# Patient Record
Sex: FEMALE | Race: BLACK OR AFRICAN AMERICAN | Employment: OTHER | URBAN - METROPOLITAN AREA
[De-identification: names, ages, dates, MRNs, and addresses within clinical notes are randomized per-mention and may not be internally consistent; named-entity substitution may affect disease eponyms.]

---

## 2017-03-15 ENCOUNTER — CARE COORDINATION (OUTPATIENT)
Dept: GERIATRIC MEDICINE | Facility: CLINIC | Age: 77
End: 2017-03-15

## 2017-03-15 NOTE — PROGRESS NOTES
Client is new enrollee to [facility] effective 3/1/17 with [facility]. No transfer documents available as previous CM had not been able to contact client.     CM called to introduce self as new CM and to offer a home visit.  was left requesting a return call.     Jodi Ingram RN  [facility]  [phone]  Fax: [phone]

## 2017-03-17 ENCOUNTER — CARE COORDINATION (OUTPATIENT)
Dept: GERIATRIC MEDICINE | Facility: CLINIC | Age: 77
End: 2017-03-17

## 2017-03-17 NOTE — PROGRESS NOTES
Second call to client to introduce self and offer a home visit.  was left requesting a return call.     Jodi Ingram RN  [facility]  [phone]  Fax: [phone]

## 2017-04-05 NOTE — PROGRESS NOTES
"Per CM, mailed client a \"Refusal of Home Visit\" letter.  MMIS entry.    Mailed member [employer] Self Report Health Assessment with self addressed return envelope.       Vidya Brooks  Case Management Specialist  [facility]   [phone]    "

## 2017-06-20 ENCOUNTER — CARE COORDINATION (OUTPATIENT)
Dept: GERIATRIC MEDICINE | Facility: CLINIC | Age: 77
End: 2017-06-20

## 2017-06-20 NOTE — PROGRESS NOTES
Call from client's daughter Glory reporting that her mom is back from [place]. Daughter is wondering if CM can set up rides for client to go to [employer]. CM explained that the health plan pays for rides to all medical appointments, but if she is interested in other transportation this would be a waiver service. CM offered to come out for an assessment and determine if client is eligible for EW. Explained that if she is eligible for waiver there would be options available for transportation. Daughter stated that they would be interested in an assessment, but will need to call back with a date that would work- likely mid July or later. Will wait for return call to schedule visit.     Jodi Ingram RN  [facility]  [phone]  Fax: [phone]

## 2017-06-21 ENCOUNTER — OFFICE VISIT (OUTPATIENT)
Dept: FAMILY MEDICINE | Facility: CLINIC | Age: 77
End: 2017-06-21
Payer: COMMERCIAL

## 2017-06-21 VITALS
TEMPERATURE: 97.3 F | DIASTOLIC BLOOD PRESSURE: 60 MMHG | SYSTOLIC BLOOD PRESSURE: 146 MMHG | HEART RATE: 58 BPM | WEIGHT: 117 LBS | BODY MASS INDEX: 22.85 KG/M2 | OXYGEN SATURATION: 98 %

## 2017-06-21 DIAGNOSIS — K21.9 GASTROESOPHAGEAL REFLUX DISEASE WITHOUT ESOPHAGITIS: ICD-10-CM

## 2017-06-21 DIAGNOSIS — M81.0 OSTEOPOROSIS: ICD-10-CM

## 2017-06-21 DIAGNOSIS — M54.2 CERVICALGIA: ICD-10-CM

## 2017-06-21 DIAGNOSIS — I10 ESSENTIAL HYPERTENSION: ICD-10-CM

## 2017-06-21 DIAGNOSIS — J45.20 INTERMITTENT ASTHMA, UNCOMPLICATED: Primary | ICD-10-CM

## 2017-06-21 DIAGNOSIS — M51.379 DEGENERATION OF LUMBAR OR LUMBOSACRAL INTERVERTEBRAL DISC: ICD-10-CM

## 2017-06-21 DIAGNOSIS — I27.20 PULMONARY HYPERTENSION (H): ICD-10-CM

## 2017-06-21 DIAGNOSIS — I34.0 MITRAL VALVE INSUFFICIENCY, UNSPECIFIED ETIOLOGY: ICD-10-CM

## 2017-06-21 DIAGNOSIS — J44.9 COPD, MODERATE (H): ICD-10-CM

## 2017-06-21 LAB
BASOPHILS # BLD AUTO: 0 10E9/L (ref 0–0.2)
BASOPHILS NFR BLD AUTO: 0.9 %
DIFFERENTIAL METHOD BLD: ABNORMAL
EOSINOPHIL # BLD AUTO: 0.3 10E9/L (ref 0–0.7)
EOSINOPHIL NFR BLD AUTO: 8.3 %
ERYTHROCYTE [DISTWIDTH] IN BLOOD BY AUTOMATED COUNT: 13.5 % (ref 10–15)
ERYTHROCYTE [SEDIMENTATION RATE] IN BLOOD BY WESTERGREN METHOD: 27 MM/H (ref 0–30)
HCT VFR BLD AUTO: 38.2 % (ref 35–47)
HGB BLD-MCNC: 12.2 G/DL (ref 11.7–15.7)
LYMPHOCYTES # BLD AUTO: 1.8 10E9/L (ref 0.8–5.3)
LYMPHOCYTES NFR BLD AUTO: 56 %
MCH RBC QN AUTO: 31.4 PG (ref 26.5–33)
MCHC RBC AUTO-ENTMCNC: 31.9 G/DL (ref 31.5–36.5)
MCV RBC AUTO: 98 FL (ref 78–100)
MONOCYTES # BLD AUTO: 0.4 10E9/L (ref 0–1.3)
MONOCYTES NFR BLD AUTO: 11.1 %
NEUTROPHILS # BLD AUTO: 0.8 10E9/L (ref 1.6–8.3)
NEUTROPHILS NFR BLD AUTO: 23.7 %
PLATELET # BLD AUTO: 156 10E9/L (ref 150–450)
RBC # BLD AUTO: 3.89 10E12/L (ref 3.8–5.2)
WBC # BLD AUTO: 3.2 10E9/L (ref 4–11)

## 2017-06-21 PROCEDURE — 85652 RBC SED RATE AUTOMATED: CPT | Performed by: INTERNAL MEDICINE

## 2017-06-21 PROCEDURE — 80053 COMPREHEN METABOLIC PANEL: CPT | Performed by: INTERNAL MEDICINE

## 2017-06-21 PROCEDURE — 85027 COMPLETE CBC AUTOMATED: CPT | Performed by: INTERNAL MEDICINE

## 2017-06-21 PROCEDURE — 99214 OFFICE O/P EST MOD 30 MIN: CPT | Performed by: INTERNAL MEDICINE

## 2017-06-21 PROCEDURE — 36415 COLL VENOUS BLD VENIPUNCTURE: CPT | Performed by: INTERNAL MEDICINE

## 2017-06-21 PROCEDURE — 80061 LIPID PANEL: CPT | Performed by: INTERNAL MEDICINE

## 2017-06-21 PROCEDURE — 85004 AUTOMATED DIFF WBC COUNT: CPT | Performed by: INTERNAL MEDICINE

## 2017-06-21 RX ORDER — TRAMADOL HYDROCHLORIDE 50 MG/1
50 TABLET ORAL EVERY 12 HOURS PRN
Qty: 60 TABLET | Refills: 3 | Status: SHIPPED | OUTPATIENT
Start: 2017-06-21 | End: 2018-02-16

## 2017-06-21 RX ORDER — METOPROLOL TARTRATE 25 MG/1
TABLET, FILM COATED ORAL
Qty: 180 TABLET | Refills: 3 | Status: SHIPPED | OUTPATIENT
Start: 2017-06-21 | End: 2018-05-22

## 2017-06-21 RX ORDER — LISINOPRIL 5 MG/1
5 TABLET ORAL DAILY
Qty: 90 TABLET | Refills: 3 | Status: SHIPPED | OUTPATIENT
Start: 2017-06-21 | End: 2018-05-22

## 2017-06-21 NOTE — MR AVS SNAPSHOT
After Visit Summary   6/21/2017    Wilma Lorenzana    MRN: [id number]           Patient Information     Date Of Birth          1940        Visit Information        Provider Department      6/21/2017 9:45 AM Lavelle Aj MD; [place] [employer] [facility] [place]        Today's Diagnoses     Intermittent asthma, uncomplicated    -  1    Gastroesophageal reflux disease without esophagitis        LUMB/LUMBOSAC DISC DEGEN        Cervicalgia        Essential hypertension        COPD, moderate (H)        Osteoporosis        Mitral valve insufficiency, unspecified etiology        Pulmonary hypertension (H)          Care Instructions    (J45.20) Intermittent asthma, uncomplicated  (primary encounter diagnosis)  Comment: Asthma is stable and we will continue your current medications of Advair and Albuterol as needed   Plan:     (K21.9) Gastroesophageal reflux disease without esophagitis  Comment: Also need refills of omeprazole.  We will continue  Plan: omeprazole (PRILOSEC) 20 MG CR capsule            (M51.37) LUMB/LUMBOSAC DISC DEGEN  Comment: Check ESR today and continue tramadol for pain control  Plan: ESR: Erythrocyte sedimentation rate, traMADol         (ULTRAM) 50 MG tablet            (M54.2) Cervicalgia  Comment: We will refill prescription for tramadol as above   Plan: traMADol (ULTRAM) 50 MG tablet            (I10) Essential hypertension  Comment: blood pressure is elevated today, but believed to be improved and stable at home.  We will continue metoprolol and lisinopril and ask you to follow up in the clinic in the next two to three weeks.  Plan: CBC with platelets, Lipid panel reflex to         direct LDL, Comprehensive metabolic panel,         metoprolol (LOPRESSOR) 25 MG tablet, lisinopril        (PRINIVIL/ZESTRIL) 5 MG tablet            (J44.9) COPD, moderate (H)  Comment: Stable  Plan:     (M81.0) Osteoporosis  Comment: Continue Boniva and vitamin D   Plan:  "    (I34.0) Mitral valve insufficiency, unspecified etiology  Comment: Recommend that we recheck an echocardiogram - you seem to be doing quite well with regards to your cardiac function . [facility] - [phone]   Plan: Echocardiogram Complete            (I27.2) Pulmonary hypertension (H)  Comment: as above   Plan: Echocardiogram Complete                     Follow-ups after your visit        Future tests that were ordered for you today     Open Future Orders        Priority Expected Expires Ordered    Echocardiogram Complete Routine  6/21/2018 6/21/2017            Who to contact     If you have questions or need follow up information about today's clinic visit or your schedule please contact [facility] directly at [phone].  Normal or non-critical lab and imaging results will be communicated to you by Idooblehart, letter or phone within 4 business days after the clinic has received the results. If you do not hear from us within 7 days, please contact the clinic through Idooblehart or phone. If you have a critical or abnormal lab result, we will notify you by phone as soon as possible.  Submit refill requests through VeedMe or call your pharmacy and they will forward the refill request to us. Please allow 3 business days for your refill to be completed.          Additional Information About Your Visit        Idooblehart Information     VeedMe lets you send messages to your doctor, view your test results, renew your prescriptions, schedule appointments and more. To sign up, go to [email or web address] . Click on \"Log in\" on the left side of the screen, which will take you to the Welcome page. Then click on \"Sign up Now\" on the right side of the page.     You will be asked to enter the access code listed below, as well as some personal information. Please follow the directions to create your username and password.     Your access code is: [email or web address]  Expires: 9/19/2017 10:30 AM     Your access code " will  in 90 days. If you need help or a new code, please call your [place] clinic or [phone].        Care EveryWhere ID     This is your Care EveryWhere ID. This could be used by other organizations to access your [place] medical records  [id number]        Your Vitals Were     Pulse Temperature Pulse Oximetry Breastfeeding? BMI (Body Mass Index)       58 97.3  F (36.3  C) (Oral) 98% No 22.85 kg/m2        Blood Pressure from Last 3 Encounters:   17 146/60   16 149/63   11/24/15 110/65    Weight from Last 3 Encounters:   17 117 lb (53.1 kg)   16 116 lb 14.4 oz (53 kg)   11/24/15 115 lb (52.2 kg)              We Performed the Following     CBC with platelets     Comprehensive metabolic panel     ESR: Erythrocyte sedimentation rate     Lipid panel reflex to direct LDL          Today's Medication Changes          These changes are accurate as of: 17 10:30 AM.  If you have any questions, ask your nurse or doctor.               These medicines have changed or have updated prescriptions.        Dose/Directions    omeprazole 20 MG CR capsule   Commonly known as:  priLOSEC   This may have changed:  See the new instructions.   Used for:  Gastroesophageal reflux disease without esophagitis   Changed by:  Lavelle Aj MD        TAKE 1 CAPSULE (20 MG) BY MOUTH DAILY   Quantity:  90 capsule   Refills:  3            Where to get your medicines      These medications were sent to [facility]/pharmacy #[id number] - [place], [place] - [address]  [address] [address]     Phone:  [phone]     lisinopril 5 MG tablet    metoprolol 25 MG tablet    omeprazole 20 MG CR capsule         Some of these will need a paper prescription and others can be bought over the counter.  Ask your nurse if you have questions.     Bring a paper prescription for each of these medications     traMADol 50 MG tablet                Primary Care Provider Office Phone # Fax #    Lavelle Aj MD  [phone]       [facility] [address]   [facility] [address]        Equal Access to Services     BROOK[place] ENNIS : Hadii aad ku hadlandon Sorebekah, wafroyda luqadaha, qaybta kaalmada shar, yamil hackett laEdwigejose martinez. So [facility] [phone].    ATENCIÓN: Si habla español, tiene a strauss disposición servicios gratuitos de asistencia lingüística. Llame al [phone].    We comply with applicable federal civil rights laws and [place] laws. We do not discriminate on the basis of race, color, national origin, age, disability sex, sexual orientation or gender identity.            Thank you!     Thank you for choosing [facility]  for your care. Our goal is always to provide you with excellent care. Hearing back from our patients is one way we can continue to improve our services. Please take a few minutes to complete the written survey that you may receive in the mail after your visit with us. Thank you!             Your Updated Medication List - Protect others around you: Learn how to safely use, store and throw away your medicines at www.disposemymeds.org.          This list is accurate as of: 6/21/17 10:30 AM.  Always use your most recent med list.                   Brand Name Dispense Instructions for use Diagnosis    albuterol 108 (90 BASE) MCG/ACT Inhaler    albuterol    3 Inhaler    Inhale 2 puffs into the lungs every 4 hours as needed for shortness of breath / dyspnea    Mild persistent asthma without complication       fluticasone-salmeterol 250-50 MCG/DOSE diskus inhaler    ADVAIR DISKUS    3 Inhaler    Inhale 1 puff into the lungs 2 times daily    Mild persistent asthma without complication       guaiFENesin-dextromethorphan 100-10 MG/5ML syrup    ROBITUSSIN DM    560 mL    Take 5 mLs by mouth every 4 hours as needed for cough    Cough       IBANdronate 150 MG tablet    BONIVA    3 tablet    Take 1 tablet (150 mg) by mouth every 30 days Take with 8 oz  water and stay upright for > 30 min after dose.  Take at least 60 min before breakfast    Osteoporosis       lisinopril 5 MG tablet    PRINIVIL/ZESTRIL    90 tablet    Take 1 tablet (5 mg) by mouth daily    Essential hypertension       metoprolol 25 MG tablet    LOPRESSOR    180 tablet    TAKE 1 TABLET (25 MG) BY MOUTH 2 TIMES DAILY    Essential hypertension       multivitamin, therapeutic with minerals Tabs tablet      Take 1 tablet by mouth daily        omeprazole 20 MG CR capsule    priLOSEC    90 capsule    TAKE 1 CAPSULE (20 MG) BY MOUTH DAILY    Gastroesophageal reflux disease without esophagitis       order for DME     1 Device    1 blood pressure cuff, automatic digital reading for home use    Benign hypertension       predniSONE 20 MG tablet    DELTASONE    10 tablet    Take 2 tablets (40 mg) by mouth daily    Acute bronchitis, unspecified organism       traMADol 50 MG tablet    ULTRAM    60 tablet    Take 1 tablet (50 mg) by mouth every 12 hours as needed for pain    Degeneration of lumbar or lumbosacral intervertebral disc, Cervicalgia       VITAMIN D2 PO      Take 1.25 mg by mouth once a week

## 2017-06-21 NOTE — LETTER
[place] [address] [facility]  [address]  [place]  [address]  Tel: [phone]    July 13, 2017    Wilma Rosy Lorenzana  [address] [address]        Dear Ms. Lorenzana,    Labs within normal limits but for new finding of low white blood cell count and neutrophil count - can we request a follow up appointment and recheck in next 2-3 weeks.    If you have any further questions or problems, please contact our office.      Sincerely,    Lavelle Aj MD / dillan          Enclosure: Lab Results

## 2017-06-21 NOTE — PROGRESS NOTES
[facility]  CLINIC PROGRESS NOTE    Subjective:   Gastroesophageal reflux disease without esophagitis   Has run out and would like to refill omeprazole.    Intermittent asthma, uncomplicated   Awetash Rosy Lorenzana has continued to have stable asthma.  She has continued on advair twice per day and also using albuterol as needed.  She is using the albuterol depending on the day from 0 times up to 3-4 times.   Hypertension   Blood pressure is well controlled.  She is checking her blood pressure at home and although she does not know the numbers she believes her blood pressure is well controlled.  She has had blood pressure medications refilled by a physician in [place].  Low back pain   She is doing well with tramadol.    Neck Stiffness   Her main concern is pain in the shoulders and neck.  She has limitation in neck range of motion but today range of motion is OK.    Past medical history, medications, allergies, social history, family history reviewed and updated in [facility] as of 6/21/2017 .    ROS  CONSTITUTIONAL: no fatigue, no unexpected change in weight  SKIN: no worrisome rashes, no worrisome moles, no worrisome lesions  EYES: no acute vision problems or changes  ENT: no ear problems, no mouth problems, no throat problems  RESP: no significant cough, no shortness of breath  CV: no chest pain, no palpitations, no new or worsening peripheral edema  GI: no nausea, no vomiting, no constipation, no diarrhea  : no frequency, no dysuria, no hematuria  MS: as above   PSYCHIATRIC: no changes in mood or affect      Objective:  Vitals  /60  Pulse 58  Temp 97.3  F (36.3  C) (Oral)  Wt 117 lb (53.1 kg)  SpO2 98%  Breastfeeding? No  BMI 22.85 kg/m2  GEN: Alert Oriented x3 NAD  HEENT: Atraumatic, normocephalic, neck supple, no thyromegaly, negative cervical adenopathy, full range of motion of neck  TM: TM bilaterally pearly and grey with normal light reflex  CV: RRR no murmurs or rubs  PULM: CTA no wheezes  or crackles  ABD: Soft, nontender nondistended,   SKIN: No visible skin lesion or ulcerations  EXT: no edema bilateral lower extremities  NEURO: Gait and station normal , No focal neurologic deficits, normal strength in upper and lower extremity 5/5   PSYCH: Mood good, affect mood congruent    No results found for this or any previous visit (from the past 24 hour(s)).    Assessment/Plan:  Patient Instructions   (J45.20) Intermittent asthma, uncomplicated  (primary encounter diagnosis)  Comment: Asthma is stable and we will continue your current medications of Advair and Albuterol as needed   Plan:     (K21.9) Gastroesophageal reflux disease without esophagitis  Comment: Also need refills of omeprazole.  We will continue  Plan: omeprazole (PRILOSEC) 20 MG CR capsule            (M51.37) LUMB/LUMBOSAC DISC DEGEN  Comment: Check ESR today and continue tramadol for pain control  Plan: ESR: Erythrocyte sedimentation rate, traMADol         (ULTRAM) 50 MG tablet            (M54.2) Cervicalgia  Comment: We will refill prescription for tramadol as above   Plan: traMADol (ULTRAM) 50 MG tablet            (I10) Essential hypertension  Comment: blood pressure is elevated today, but believed to be improved and stable at home.  We will continue metoprolol and lisinopril and ask you to follow up in the clinic in the next two to three weeks.  Plan: CBC with platelets, Lipid panel reflex to         direct LDL, Comprehensive metabolic panel,         metoprolol (LOPRESSOR) 25 MG tablet, lisinopril        (PRINIVIL/ZESTRIL) 5 MG tablet            (J44.9) COPD, moderate (H)  Comment: Stable  Plan:     (M81.0) Osteoporosis  Comment: Continue Boniva and vitamin D   Plan:     (I34.0) Mitral valve insufficiency, unspecified etiology  Comment: Recommend that we recheck an echocardiogram - you seem to be doing quite well with regards to your cardiac function . [facility] - [phone]   Plan: Echocardiogram Complete            (I27.2)  Pulmonary hypertension (H)  Comment: as above   Plan: Echocardiogram Complete               Follow up in 3 months    Disclaimer: This note consists of symbols derived from keyboarding, dictation and/or voice recognition software. As a result, there may be errors in the script that have gone undetected. Please consider this when interpreting information found in this chart.    Lavelle Aj MD  [phone]

## 2017-06-21 NOTE — NURSING NOTE
Chief Complaint   Patient presents with     Recheck Medication       Initial /60  Pulse 58  Temp 97.3  F (36.3  C) (Oral)  Wt 117 lb (53.1 kg)  SpO2 98%  Breastfeeding? No  BMI 22.85 kg/m2 Estimated body mass index is 22.85 kg/(m^2) as calculated from the following:    Height as of 5/16/16: 5' (1.524 m).    Weight as of this encounter: 117 lb (53.1 kg).  Medication Reconciliation: complete   Sakina DURAN CMA

## 2017-06-21 NOTE — PATIENT INSTRUCTIONS
(J45.20) Intermittent asthma, uncomplicated  (primary encounter diagnosis)  Comment: Asthma is stable and we will continue your current medications of Advair and Albuterol as needed   Plan:     (K21.9) Gastroesophageal reflux disease without esophagitis  Comment: Also need refills of omeprazole.  We will continue  Plan: omeprazole (PRILOSEC) 20 MG CR capsule            (M51.37) LUMB/LUMBOSAC DISC DEGEN  Comment: Check ESR today and continue tramadol for pain control  Plan: ESR: Erythrocyte sedimentation rate, traMADol         (ULTRAM) 50 MG tablet            (M54.2) Cervicalgia  Comment: We will refill prescription for tramadol as above   Plan: traMADol (ULTRAM) 50 MG tablet            (I10) Essential hypertension  Comment: blood pressure is elevated today, but believed to be improved and stable at home.  We will continue metoprolol and lisinopril and ask you to follow up in the clinic in the next two to three weeks.  Plan: CBC with platelets, Lipid panel reflex to         direct LDL, Comprehensive metabolic panel,         metoprolol (LOPRESSOR) 25 MG tablet, lisinopril        (PRINIVIL/ZESTRIL) 5 MG tablet            (J44.9) COPD, moderate (H)  Comment: Stable  Plan:     (M81.0) Osteoporosis  Comment: Continue Boniva and vitamin D   Plan:     (I34.0) Mitral valve insufficiency, unspecified etiology  Comment: Recommend that we recheck an echocardiogram - you seem to be doing quite well with regards to your cardiac function . [facility] - [phone]   Plan: Echocardiogram Complete            (I27.2) Pulmonary hypertension (H)  Comment: as above   Plan: Echocardiogram Complete

## 2017-06-22 LAB
ALBUMIN SERPL-MCNC: 3.8 G/DL (ref 3.4–5)
ALP SERPL-CCNC: 75 U/L (ref 40–150)
ALT SERPL W P-5'-P-CCNC: 17 U/L (ref 0–50)
ANION GAP SERPL CALCULATED.3IONS-SCNC: 8 MMOL/L (ref 3–14)
AST SERPL W P-5'-P-CCNC: 18 U/L (ref 0–45)
BILIRUB SERPL-MCNC: 0.4 MG/DL (ref 0.2–1.3)
BUN SERPL-MCNC: 15 MG/DL (ref 7–30)
CALCIUM SERPL-MCNC: 9.4 MG/DL (ref 8.5–10.1)
CHLORIDE SERPL-SCNC: 105 MMOL/L (ref 94–109)
CHOLEST SERPL-MCNC: 175 MG/DL
CO2 SERPL-SCNC: 28 MMOL/L (ref 20–32)
CREAT SERPL-MCNC: 0.65 MG/DL (ref 0.52–1.04)
GFR SERPL CREATININE-BSD FRML MDRD: 89 ML/MIN/1.7M2
GLUCOSE SERPL-MCNC: 75 MG/DL (ref 70–99)
HDLC SERPL-MCNC: 59 MG/DL
LDLC SERPL CALC-MCNC: 105 MG/DL
NONHDLC SERPL-MCNC: 116 MG/DL
POTASSIUM SERPL-SCNC: 4.3 MMOL/L (ref 3.4–5.3)
PROT SERPL-MCNC: 7.8 G/DL (ref 6.8–8.8)
SODIUM SERPL-SCNC: 141 MMOL/L (ref 133–144)
TRIGL SERPL-MCNC: 57 MG/DL

## 2017-07-17 ENCOUNTER — TELEPHONE (OUTPATIENT)
Dept: FAMILY MEDICINE | Facility: CLINIC | Age: 77
End: 2017-07-17

## 2017-07-17 DIAGNOSIS — Z01.00 EXAMINATION OF EYES AND VISION: Primary | ICD-10-CM

## 2017-07-17 NOTE — TELEPHONE ENCOUNTER
FHN: [facility] [place] [phone] [email or web address]    Lavelle Aj MD

## 2017-07-17 NOTE — TELEPHONE ENCOUNTER
Reason for Call:  Other Referral for Eye doctor    Detailed comments: The paatient needs a referral for an Eye doctor     Phone Number Patient can be reached at: Call Daughter Janelle when   Referral is complete    Best Time: anytime    Can we leave a detailed message on this number? YES    Call taken on 7/17/2017 at 3:26 PM by Estella Brown

## 2017-07-20 ENCOUNTER — CARE COORDINATION (OUTPATIENT)
Dept: GERIATRIC MEDICINE | Facility: CLINIC | Age: 77
End: 2017-07-20

## 2017-07-20 NOTE — PROGRESS NOTES
Call from daughter asking for the number she can call to schedule her mom a ride. Number for [employer] was given. CM confirmed that she had the [employer] ID # as she would need this, and also informed her that she will need the address for the clinic that she is going. Daughter asked if writer is the person who could come out an do an assessment. CM confirmed this was correct and daughter shared she will call back when they are ready for a visit.     Jodi Ingram RN  [facility]  [phone]  Fax: [phone]

## 2017-08-02 ENCOUNTER — TELEPHONE (OUTPATIENT)
Dept: FAMILY MEDICINE | Facility: CLINIC | Age: 77
End: 2017-08-02

## 2017-08-02 NOTE — TELEPHONE ENCOUNTER
PA has been sent to [facility] ([employer]) for further review of the ibandronate for senile osteoporosis.    Yonathan Burks, CMA

## 2017-08-11 NOTE — TELEPHONE ENCOUNTER
PA has been approved for the ibandronate 150 mg from 5/4/17 - 8/2/18. Pharmacy already knows.    Yonathan Burks, CMA

## 2017-08-25 ENCOUNTER — CARE COORDINATION (OUTPATIENT)
Dept: GERIATRIC MEDICINE | Facility: CLINIC | Age: 77
End: 2017-08-25

## 2017-08-25 NOTE — PROGRESS NOTES
Call from daughter to schedule assessment. Initial home visit has been scheduled for 9/8/17 at 11AM. Daughter declined an  stating that her mom is more comfortable with her interpreting. Client and daughter feel there tends to be misunderstandings when they use an . Shared that a waiver will need to be signed.     Jodi Ingram RN  [facility]  [phone]  Fax: [phone]

## 2017-08-30 ENCOUNTER — HOSPITAL ENCOUNTER (OUTPATIENT)
Dept: CARDIOLOGY | Facility: CLINIC | Age: 77
Discharge: HOME | End: 2017-08-30
Attending: INTERNAL MEDICINE | Admitting: INTERNAL MEDICINE
Payer: COMMERCIAL

## 2017-08-30 DIAGNOSIS — I27.20 PULMONARY HYPERTENSION (H): ICD-10-CM

## 2017-08-30 DIAGNOSIS — I34.0 MITRAL VALVE INSUFFICIENCY, UNSPECIFIED ETIOLOGY: ICD-10-CM

## 2017-08-30 PROCEDURE — 93306 TTE W/DOPPLER COMPLETE: CPT | Mod: 26 | Performed by: INTERNAL MEDICINE

## 2017-08-30 PROCEDURE — 93306 TTE W/DOPPLER COMPLETE: CPT

## 2017-08-31 ENCOUNTER — TELEPHONE (OUTPATIENT)
Dept: FAMILY MEDICINE | Facility: CLINIC | Age: 77
End: 2017-08-31

## 2017-09-01 NOTE — TELEPHONE ENCOUNTER
Called Home # with the help of  services -no answer   Called Daughters Mobile # and could not leave message -mailbox was full-Madalyn MCCRACKEN CMA

## 2017-09-01 NOTE — TELEPHONE ENCOUNTER
Spoke with Daughter ([employer]) and gave her results of her Mom's Echo and recommendations from Dr. Aj- she will call [facility] and and schedule with Dr.Ip-Leatrice KAYKAY CMA

## 2017-09-01 NOTE — TELEPHONE ENCOUNTER
Can we call Wilma Lorenzana and let her know that     Your echocardiogram shows worsening of your mitral valve stenosis and I would recommend a follow up in cardiology with Dr. Carrasco to review - [facility] - [phone]

## 2017-09-06 ENCOUNTER — CARE COORDINATION (OUTPATIENT)
Dept: GERIATRIC MEDICINE | Facility: CLINIC | Age: 77
End: 2017-09-06

## 2017-09-08 ENCOUNTER — CARE COORDINATION (OUTPATIENT)
Dept: GERIATRIC MEDICINE | Facility: CLINIC | Age: 77
End: 2017-09-08

## 2017-09-08 NOTE — PROGRESS NOTES
Home visit/[facility] Risk Assessment/EW screening completed on: 9/8/17 with client, her daughter, and this CM. Client declined to have a formal  and requested that her daughter interpret. Waiver was signed.   Member resides: Client lives in a townhouse with her daughter. Home is clean and free from any odor. Client's room is on the second floor- client observed to go up the full flight of stairs independently.   Member currently receiving the following services: No formal service.  See EMR for a list of client's diagnoses and medications.  Medication management: Client takes medications out of the bottle. At times daughter provides reminders for medications. Client has good understanding of medications.  Falls: Client denies any falls. Client does report some changes to her balance and requested a cane.   ADL's/IADL's:  Client is independent with all ADLs. Daughter provides some assistance with shopping, transportation, and cleaning. CM will send MM application as client is no longer able to take the bus- too much walking. Client would like to go to [employer] more frequently.   Member Mood/behavior-PHQ9 score:  Client denies any concerns with depression, anxiety, or feeling down.   [facility] Plan sponsored benefits: Shared information re: Silver Sneakers/gym memberships, ASA, Calcium +D.  Plan of Care Is: Client has requested a cane. CM will send MM application so client can utilize for transportation to [employer].   Release of Information: Updated and signed.   Follow-Up Plan: Member informed of future contact, plan to f/u with member with a 6 month telephone assessment.  Contact information shared with member and family, encouraged member to call with any questions or concerns prior to this.  See [facility] for further detailed information    Jodi Ingram RN  [facility]  [phone]  Fax: [phone]

## 2017-09-14 ENCOUNTER — OFFICE VISIT (OUTPATIENT)
Dept: CARDIOLOGY | Facility: CLINIC | Age: 77
End: 2017-09-14
Payer: COMMERCIAL

## 2017-09-14 VITALS
BODY MASS INDEX: 21.79 KG/M2 | WEIGHT: 111 LBS | HEIGHT: 60 IN | SYSTOLIC BLOOD PRESSURE: 139 MMHG | DIASTOLIC BLOOD PRESSURE: 66 MMHG | HEART RATE: 60 BPM

## 2017-09-14 DIAGNOSIS — I05.9 RHEUMATIC MITRAL VALVE DISEASE: ICD-10-CM

## 2017-09-14 DIAGNOSIS — I34.0 MITRAL VALVE INSUFFICIENCY, UNSPECIFIED ETIOLOGY: Primary | ICD-10-CM

## 2017-09-14 PROCEDURE — 93000 ELECTROCARDIOGRAM COMPLETE: CPT | Performed by: INTERNAL MEDICINE

## 2017-09-14 PROCEDURE — 99204 OFFICE O/P NEW MOD 45 MIN: CPT | Mod: 25 | Performed by: INTERNAL MEDICINE

## 2017-09-14 NOTE — PROGRESS NOTES
HPI and Plan:   See dictation    Orders Placed This Encounter   Procedures     Follow-Up with Cardiologist     EKG 12-lead complete w/read - Clinics (performed today)     Echocardiogram       No orders of the defined types were placed in this encounter.      Encounter Diagnoses   Name Primary?     Mitral valve insufficiency, unspecified etiology Yes     Rheumatic mitral valve disease        CURRENT MEDICATIONS:  Current Outpatient Prescriptions   Medication Sig Dispense Refill     IBANdronate (BONIVA) 150 MG tablet TAKE 1 TABLET BY MOUTH EVERY 30 DAYS 1 HR BEFORE BREAKFAST WITH 8OZ OF WATER AND SIT UPRIGHT 30 MIN 3 tablet 3     Ergocalciferol (VITAMIN D2 PO) Take 1.25 mg by mouth once a week       omeprazole (PRILOSEC) 20 MG CR capsule TAKE 1 CAPSULE (20 MG) BY MOUTH DAILY 90 capsule 3     traMADol (ULTRAM) 50 MG tablet Take 1 tablet (50 mg) by mouth every 12 hours as needed for pain 60 tablet 3     metoprolol (LOPRESSOR) 25 MG tablet TAKE 1 TABLET (25 MG) BY MOUTH 2 TIMES DAILY 180 tablet 3     lisinopril (PRINIVIL/ZESTRIL) 5 MG tablet Take 1 tablet (5 mg) by mouth daily 90 tablet 3     fluticasone-salmeterol (ADVAIR DISKUS) 250-50 MCG/DOSE diskus inhaler Inhale 1 puff into the lungs 2 times daily 3 Inhaler 0     albuterol (ALBUTEROL) 108 (90 BASE) MCG/ACT Inhaler Inhale 2 puffs into the lungs every 4 hours as needed for shortness of breath / dyspnea 3 Inhaler 3     multivitamin, therapeutic with minerals (THERA-VIT-M) TABS Take 1 tablet by mouth daily       ORDER FOR DME 1 blood pressure cuff, automatic digital reading for home use 1 Device 0       ALLERGIES     Allergies   Allergen Reactions     Alendronate Sodium      Worse acid reflux     Food      Meat, eggs, dairy       PAST MEDICAL HISTORY:  Past Medical History:   Diagnosis Date     ABNORMAL ECHO 5/04 5/30/2004    Mild-mod MV Ca++, Mild MR, Mild-Mod TR, Mild-Mod AL, Mild-Mod PHTN, Mod AV sclerosis, Mild EAD L>R, EF normal 70%      AORTIC VALVE SCLEROSIS  5/30/2004    needs ABX prophylaxis     COMPRESSION FRX T5      Degeneration of lumbar or lumbosacral intervertebral disc 11/04    L5-S1 mild-mod,L3-4/L4-5 mod. bilat foraminal stenosis , L3-4/ L4-5, mod- sev. cent stenosis     HELICOBACTER PYLORI INFECTION 11/1/2005     JOINT PAIN BILATERAL THUMBS 2/9/2004     L SHOULDER PAIN 2/9/2004     Mild persistent asthma      Myelomalacia (H) 2/2011     NECK PAIN, HX CERVICAL SURGERY 2/9/2004     Osteoporosis, unspecified 2/04     POSTLAMINECTOMY STATUS-CERV      SCOLIOSIS      Strabismic amblyopia        PAST SURGICAL HISTORY:  Past Surgical History:   Procedure Laterality Date     C DEXA, BONE DENSITY, AXIAL SKEL  2/04    L1-L4 -4.9, L 4- 5.6,FemNk L-3.5,R-3.4, ProxiFArm: -4.4,DistForearm:  -5.1     C FLUOROSCOPIC GUIDE & LOCALIZATION, NEEDLE/CATHETER TIP, SPINE/PARASPINE DX/THERAPEUTIC INJECTION  5/05    lumbar     C LAMINECTOMY,>2 SGMT,CERVICAL  ~1996?     laminectomy C3-7 with laminoplasty     HC CT ABDOMEN WO&W CONTRAST  11/2006    Slight prominence/tortuosity pancreatic duct , 2 small retroperitoneal areas with increased density (stable)     HC MRI CERVICAL SPINE W/O CONTRAST       HC MRI LUMBAR SPINE W/O CONTRAST  10/04    L5-S1 mild-mod,L3-4/L4-5 mod. bilat foraminal stenosis , L3-4/ L4-5, mod- sev. 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      Social History Main Topics     Smoking status: Never Smoker     Smokeless tobacco: Never Used     Alcohol use No     Drug use: No     Sexual activity: No      Comment:      Other Topics Concern      Service No     Blood Transfusions No     Exercise Yes     WORKS , GOES TO PT     Social History Narrative    SPEAKS NO  ENGLISH    DAUGHTER ACCOMPANIES HER FOR APPTS     3 CHILDREN    LIVES WITH HER DAUGHTER                Review of Systems:  Skin:  Negative     Eyes:  Positive for glasses  ENT:  Negative    Respiratory:  Positive for cough (asthma)  Cardiovascular:    Positive for;palpitations;fatigue (fast rate at times)  Gastroenterology: Positive for reflux  Genitourinary:  Negative    Musculoskeletal:  Positive for joint pain  Neurologic:  Negative    Psychiatric:  Negative    Heme/Lymph/Imm:  Positive for allergies  Endocrine:  Negative      Physical Exam:  Vitals: /66  Pulse 60  Ht 1.524 m (5')  Wt 50.3 kg (111 lb)  BMI 21.68 kg/m2    Constitutional:  cooperative, alert and oriented, well developed, well nourished, in no acute distress        Skin:  warm and dry to the touch, no apparent skin lesions or masses noted        Head:  normocephalic, no masses or lesions        Eyes:  pupils equal and round, conjunctivae and lids unremarkable, sclera white, no xanthalasma, EOMS intact, no nystagmus        ENT:  no pallor or cyanosis, dentition good        Neck:  carotid pulses are full and equal bilaterally, JVP normal, no carotid bruit, no thyromegaly        Chest:  normal breath sounds, clear to auscultation, normal A-P diameter, normal symmetry, normal respiratory excursion, no use of accessory muscles        Cardiac: regular rhythm;normal S1 and S2       systolic murmur;grade 1          Abdomen:  abdomen soft, non-tender, BS normoactive, no mass, no HSM, no bruits        Vascular: pulses full and equal, no bruits auscultated                                      Extremities and Back:  no deformities, clubbing, cyanosis, erythema observed        Neurological:  affect appropriate, oriented to time, person and place          Recent Lab Results:  LIPID RESULTS:  Lab Results   Component Value Date    CHOL 175 06/21/2017    HDL 59 06/21/2017     (H) 06/21/2017    TRIG 57 06/21/2017    CHOLHDLRATIO 3.1 08/26/2014        LIVER ENZYME RESULTS:  Lab Results   Component Value Date    AST 18 06/21/2017    ALT 17 06/21/2017       CBC RESULTS:  Lab Results   Component Value Date    WBC 3.2 (L) 06/21/2017    RBC 3.89 06/21/2017    HGB 12.2 06/21/2017    HCT 38.2 06/21/2017    MCV 98 06/21/2017    MCH 31.4 06/21/2017    MCHC 31.9 06/21/2017    RDW 13.5 06/21/2017     06/21/2017       BMP RESULTS:  Lab Results   Component Value Date     06/21/2017    POTASSIUM 4.3 06/21/2017    CHLORIDE 105 06/21/2017    CO2 28 06/21/2017    ANIONGAP 8 06/21/2017    GLC 75 06/21/2017    BUN 15 06/21/2017    CR 0.65 06/21/2017    GFRESTIMATED 89 06/21/2017    GFRESTBLACK >90   GFR Calc   06/21/2017    DORY 9.4 06/21/2017        A1C RESULTS:  Lab Results   Component Value Date    A1C 5.9 02/09/2004       INR RESULTS:  No results found for: INR        CC  Lavelle Aj MD  [address] [place] [address]

## 2017-09-14 NOTE — LETTER
9/14/2017    Lavelle Aj MD  [address] [facility] [address]  [place] [address]    RE: Wilma Lorenzana       Dear Colleague,    I had the pleasure of seeing Wilma Lorenzana in the [facility].    It is a pleasure for me to followup with this delightful 76-year-old Burmese monk.  Dr. Aj asked for an urgent followup because of rheumatic heart disease.      On review of my old records, I do see that I saw this lady twice in 2012.  He notes history of possible pneumatic heart disease with mild mitral stenosis, mild mitral regurgitation and moderate pulmonary hypertension.  In addition, this lady also had a positive stress nuclear study, but minimal symptoms suggesting ischemia.  I repeated a stress test and I am happy to say that that was normal.      A more recent followup echocardiogram showed that the mitral stenosis has now progressed to moderate.  Previous gradient was 6-7 and it is now 10 across the valve.  The mitral regurgitation is now described as moderate.  Interestingly, the PA pressures appear lower than what they were previously.  There was moderate pulmonic regurgitation as well.      Symptomatically, this lady's daughter tells me that there has been no major changes.  She gets more fatigued with exertion, but denies shortness of breath.  She has asthma and has a chronic longstanding nocturnal cough which has not worsened.  She has no PND, orthopnea or chest pains.  She has no ankle swelling.      PHYSICAL EXAMINATION:  I auscultated a 1/6 systolic murmur at the apex.  With presystolic attenuation and change in posture, I still did not detect any significant diastolic murmurs.  Her chest is clear.  JVP is not elevated.     Outpatient Encounter Prescriptions as of 9/14/2017   Medication Sig Dispense Refill     IBANdronate (BONIVA) 150 MG tablet TAKE 1 TABLET BY MOUTH EVERY 30 DAYS 1 HR BEFORE BREAKFAST WITH 8OZ OF WATER AND SIT UPRIGHT 30 MIN 3 tablet 3      Ergocalciferol (VITAMIN D2 PO) Take 1.25 mg by mouth once a week       omeprazole (PRILOSEC) 20 MG CR capsule TAKE 1 CAPSULE (20 MG) BY MOUTH DAILY 90 capsule 3     traMADol (ULTRAM) 50 MG tablet Take 1 tablet (50 mg) by mouth every 12 hours as needed for pain 60 tablet 3     metoprolol (LOPRESSOR) 25 MG tablet TAKE 1 TABLET (25 MG) BY MOUTH 2 TIMES DAILY 180 tablet 3     lisinopril (PRINIVIL/ZESTRIL) 5 MG tablet Take 1 tablet (5 mg) by mouth daily 90 tablet 3     fluticasone-salmeterol (ADVAIR DISKUS) 250-50 MCG/DOSE diskus inhaler Inhale 1 puff into the lungs 2 times daily 3 Inhaler 0     albuterol (ALBUTEROL) 108 (90 BASE) MCG/ACT Inhaler Inhale 2 puffs into the lungs every 4 hours as needed for shortness of breath / dyspnea 3 Inhaler 3     multivitamin, therapeutic with minerals (THERA-VIT-M) TABS Take 1 tablet by mouth daily       ORDER FOR DME 1 blood pressure cuff, automatic digital reading for home use 1 Device 0     [DISCONTINUED] predniSONE (DELTASONE) 20 MG tablet Take 2 tablets (40 mg) by mouth daily 10 tablet 0     [DISCONTINUED] guaiFENesin-dextromethorphan (ROBITUSSIN DM) 100-10 MG/5ML syrup Take 5 mLs by mouth every 4 hours as needed for cough 560 mL 0     No facility-administered encounter medications on file as of 9/14/2017.       IMPRESSION AND PLAN:  I think this lady's symptoms are more likely due to a combination of asthma, physical deconditioning and aging.  I do not think it is due to mitral valve disease which in any case is not severe enough for intervention.  With the presence of moderate mitral regurgitation, I think it is more likely that she would be a surgical candidate rather than a candidate for percutaneous intervention.  I described this to the daughter and she does not think her mom would be keen for any form of intervention.  I also explained that I do not think there are any medications which would lead to progression of her mitral valve disease.  Nonetheless, I did emphasize  that she should come to us for regular followup.  I had asked her to do that at the end of 2012, but she has not shown up until now.  With that length of time, it would be completely natural for there to be progression in her mitral valve disease.  All questions were answered.  I look forward to seeing her again in a year's time for further followup.     Again, thank you for allowing me to participate in the care of your patient.      Sincerely,    DR JENAE LAU MD     [facility]

## 2017-09-14 NOTE — PROGRESS NOTES
HISTORY OF PRESENT ILLNESS:  It is a pleasure for me to followup with this delightful 76-year-old Uzbek monk.  Dr. Aj asked for an urgent followup because of rheumatic heart disease.      On review of my old records, I do see that I saw this lady twice in 2012.  He notes history of possible pneumatic heart disease with mild mitral stenosis, mild mitral regurgitation and moderate pulmonary hypertension.  In addition, this lady also had a positive stress nuclear study, but minimal symptoms suggesting ischemia.  I repeated a stress test and I am happy to say that that was normal.      A more recent followup echocardiogram showed that the mitral stenosis has now progressed to moderate.  Previous gradient was 6-7 and it is now 10 across the valve.  The mitral regurgitation is now described as moderate.  Interestingly, the PA pressures appear lower than what they were previously.  There was moderate pulmonic regurgitation as well.      Symptomatically, this lady's daughter tells me that there has been no major changes.  She gets more fatigued with exertion, but denies shortness of breath.  She has asthma and has a chronic longstanding nocturnal cough which has not worsened.  She has no PND, orthopnea or chest pains.  She has no ankle swelling.      PHYSICAL EXAMINATION:  I auscultated a 1/6 systolic murmur at the apex.  With presystolic attenuation and change in posture, I still did not detect any significant diastolic murmurs.  Her chest is clear.  JVP is not elevated.      IMPRESSION AND PLAN:  I think this lady's symptoms are more likely due to a combination of asthma, physical deconditioning and aging.  I do not think it is due to mitral valve disease which in any case is not severe enough for intervention.  With the presence of moderate mitral regurgitation, I think it is more likely that she would be a surgical candidate rather than a candidate for percutaneous intervention.  I described this to the  daughter and she does not think her mom would be keen for any form of intervention.  I also explained that I do not think there are any medications which would lead to progression of her mitral valve disease.  Nonetheless, I did emphasize that she should come to us for regular followup.  I had asked her to do that at the end of , but she has not shown up until now.  With that length of time, it would be completely natural for there to be progression in her mitral valve disease.  All questions were answered.  I look forward to seeing her again in a year's time for further followup.         JENAE LAU MD, [facility]             D: 2017 09:46   T: 2017 12:52   MT: MIRLANDE      Name:     ABHINAV ARMSTRONG   MRN:      [id number]        Account:      [id number]   :      1940           Service Date: 2017      Document: [id number]

## 2017-09-14 NOTE — MR AVS SNAPSHOT
"              After Visit Summary   9/14/2017    Wilma Lorenzana    MRN: [id number]           Patient Information     Date Of Birth          1940        Visit Information        Provider Department      9/14/2017 8:30 AM Open, Lavelle Weeks MD [facility] [place]        Today's Diagnoses     Mitral valve insufficiency, unspecified etiology    -  1    Rheumatic mitral valve disease           Follow-ups after your visit        Additional Services     Follow-Up with Cardiologist                 Future tests that were ordered for you today     Open Future Orders        Priority Expected Expires Ordered    Follow-Up with Cardiologist Routine 9/14/2018 9/15/2018 9/14/2017    Echocardiogram Routine 9/14/2018 9/15/2018 9/14/2017            Who to contact     If you have questions or need follow up information about today's clinic visit or your schedule please contact [facility] [place] directly at [phone].  Normal or non-critical lab and imaging results will be communicated to you by MyChart, letter or phone within 4 business days after the clinic has received the results. If you do not hear from us within 7 days, please contact the clinic through [facility] or phone. If you have a critical or abnormal lab result, we will notify you by phone as soon as possible.  Submit refill requests through [facility] or call your pharmacy and they will forward the refill request to us. Please allow 3 business days for your refill to be completed.          Additional Information About Your Visit        [facility] Information     [facility] lets you send messages to your doctor, view your test results, renew your prescriptions, schedule appointments and more. To sign up, go to [email or web address] . Click on \"Log in\" on the left side of the screen, which will take you to the Welcome page. Then click on \"Sign up Now\" on the right side of the page. "     You will be asked to enter the access code listed below, as well as some personal information. Please follow the directions to create your username and password.     Your access code is: [email or web address]  Expires: 2017 10:30 AM     Your access code will  in 90 days. If you need help or a new code, please call your [place] clinic or [phone].        Care EveryWhere ID     This is your Care EveryWhere ID. This could be used by other organizations to access your [place] medical records  [id number]        Your Vitals Were     Pulse Height BMI (Body Mass Index)             60 1.524 m (5') 21.68 kg/m2          Blood Pressure from Last 3 Encounters:   17 139/66   17 146/60   16 149/63    Weight from Last 3 Encounters:   17 50.3 kg (111 lb)   17 53.1 kg (117 lb)   16 53 kg (116 lb 14.4 oz)              We Performed the Following     EKG 12-lead complete w/read - Clinics (performed today)        Primary Care Provider Office Phone # Fax #    Lavelle Aj [phone] [address] [facility] [address]  [facility] [address]        Equal Access to Services     [place] ENNIS : Hadii aad ku hadasho Soomaali, waaxda luqadaha, qaybta kaalmada adeegyada, waxay idiin hayaan sarabjit boudreaux . So [facility] [phone].    ATENCIÓN: Si habla español, tiene a strauss disposición servicios gratuitos de asistencia lingüística. Llame al [phone].    We comply with applicable federal civil rights laws and [place] laws. We do not discriminate on the basis of race, color, national origin, age, disability sex, sexual orientation or gender identity.            Thank you!     Thank you for choosing [facility] [place]  for your care. Our goal is always to provide you with excellent care. Hearing back from our patients is one way we can continue to improve our services. Please take a few minutes to complete the written survey that you may  receive in the mail after your visit with us. Thank you!             Your Updated Medication List - Protect others around you: Learn how to safely use, store and throw away your medicines at www.disposemymeds.org.          This list is accurate as of: 9/14/17  9:25 AM.  Always use your most recent med list.                   Brand Name Dispense Instructions for use Diagnosis    albuterol 108 (90 BASE) MCG/ACT Inhaler    PROAIR HFA    3 Inhaler    Inhale 2 puffs into the lungs every 4 hours as needed for shortness of breath / dyspnea    Mild persistent asthma without complication       fluticasone-salmeterol 250-50 MCG/DOSE diskus inhaler    ADVAIR DISKUS    3 Inhaler    Inhale 1 puff into the lungs 2 times daily    Mild persistent asthma without complication       IBANdronate 150 MG tablet    BONIVA    3 tablet    TAKE 1 TABLET BY MOUTH EVERY 30 DAYS 1 HR BEFORE BREAKFAST WITH 8OZ OF WATER AND SIT UPRIGHT 30 MIN    Senile osteoporosis       lisinopril 5 MG tablet    PRINIVIL/ZESTRIL    90 tablet    Take 1 tablet (5 mg) by mouth daily    Essential hypertension       metoprolol 25 MG tablet    LOPRESSOR    180 tablet    TAKE 1 TABLET (25 MG) BY MOUTH 2 TIMES DAILY    Essential hypertension       multivitamin, therapeutic with minerals Tabs tablet      Take 1 tablet by mouth daily        omeprazole 20 MG CR capsule    priLOSEC    90 capsule    TAKE 1 CAPSULE (20 MG) BY MOUTH DAILY    Gastroesophageal reflux disease without esophagitis       order for DME     1 Device    1 blood pressure cuff, automatic digital reading for home use    Benign hypertension       traMADol 50 MG tablet    ULTRAM    60 tablet    Take 1 tablet (50 mg) by mouth every 12 hours as needed for pain    Degeneration of lumbar or lumbosacral intervertebral disc, Cervicalgia       VITAMIN D2 PO      Take 1.25 mg by mouth once a week

## 2017-09-19 ENCOUNTER — CARE COORDINATION (OUTPATIENT)
Dept: GERIATRIC MEDICINE | Facility: CLINIC | Age: 77
End: 2017-09-19

## 2017-09-19 NOTE — PROGRESS NOTES
Order placed with [facility] (p: [phone]; f: [phone]) for 4 Pronged cane.  Order placed on 09/19/2017. Access updated.  As required, authorization submitted to health plan.      Vidya Brooks  Case Management Specialist  [facility] [place]   [phone]

## 2017-09-19 NOTE — PROGRESS NOTES
Received after visit chart from care coordinator.  Completed following tasks: Mailed copy of care plan to client  Updated services in access  Entered MMIS  Chart was returned to CC.     Per CC, mailed caregiver support information to Sergio Holder.      Vidya Brooks  Case Management Specialist  [facility]   [phone]

## 2017-09-28 ENCOUNTER — OFFICE VISIT (OUTPATIENT)
Dept: FAMILY MEDICINE | Facility: CLINIC | Age: 77
End: 2017-09-28
Payer: COMMERCIAL

## 2017-09-28 VITALS
WEIGHT: 111 LBS | DIASTOLIC BLOOD PRESSURE: 79 MMHG | TEMPERATURE: 98.1 F | HEART RATE: 84 BPM | HEIGHT: 60 IN | BODY MASS INDEX: 21.79 KG/M2 | SYSTOLIC BLOOD PRESSURE: 140 MMHG | OXYGEN SATURATION: 96 %

## 2017-09-28 DIAGNOSIS — J45.21 INTERMITTENT ASTHMA, WITH ACUTE EXACERBATION: ICD-10-CM

## 2017-09-28 DIAGNOSIS — R05.9 COUGH: Primary | ICD-10-CM

## 2017-09-28 DIAGNOSIS — J06.9 VIRAL URI: ICD-10-CM

## 2017-09-28 PROCEDURE — 99213 OFFICE O/P EST LOW 20 MIN: CPT | Performed by: INTERNAL MEDICINE

## 2017-09-28 RX ORDER — PREDNISONE 20 MG/1
20 TABLET ORAL 2 TIMES DAILY
Qty: 10 TABLET | Refills: 0 | Status: SHIPPED | OUTPATIENT
Start: 2017-09-28 | End: 2017-11-17

## 2017-09-28 NOTE — PROGRESS NOTES
Wilma Lorenzana is a 77 year old female who presents for cough and cold in patient with asthma.  Here with interpretor.      The patient has had this for 4 days, has had cough, occasionally prod, feels hot, ?fever, no gi c/o, no ear pain or s.t.  Asthma a bit worse with it.  Using advair bid and albuterol prn.  Chest hurts just when coughs, a bit more shortness of breath.  Has chronic neck pain for years, not new or changed.  No other c/o.  No edema.  No sinus pain or pressure, some clear nasal dc.    Past Medical History:   Diagnosis Date     ABNORMAL ECHO 5/04 5/30/2004    Mild-mod MV Ca++, Mild MR, Mild-Mod TR, Mild-Mod NM, Mild-Mod PHTN, Mod AV sclerosis, Mild EAD L>R, EF normal 70%      AORTIC VALVE SCLEROSIS 5/30/2004    needs ABX prophylaxis     COMPRESSION FRX T5      Degeneration of lumbar or lumbosacral intervertebral disc 11/04    L5-S1 mild-mod,L3-4/L4-5 mod. bilat foraminal stenosis , L3-4/ L4-5, mod- sev. cent stenosis     HELICOBACTER PYLORI INFECTION 11/1/2005     JOINT PAIN BILATERAL THUMBS 2/9/2004     L SHOULDER PAIN 2/9/2004     Mild persistent asthma      Myelomalacia (H) 2/2011     NECK PAIN, HX CERVICAL SURGERY 2/9/2004     Osteoporosis, unspecified 2/04     POSTLAMINECTOMY STATUS-CERV      SCOLIOSIS      Strabismic amblyopia      Past Surgical History:   Procedure Laterality Date     C DEXA, BONE DENSITY, AXIAL SKEL  2/04    L1-L4 -4.9, L 4- 5.6,FemNk L-3.5,R-3.4, ProxiFArm: -4.4,DistForearm:  -5.1     C FLUOROSCOPIC GUIDE & LOCALIZATION, NEEDLE/CATHETER TIP, SPINE/PARASPINE DX/THERAPEUTIC INJECTION  5/05    lumbar     C LAMINECTOMY,>2 SGMT,CERVICAL  ~1996?     laminectomy C3-7 with laminoplasty     HC CT ABDOMEN WO&W CONTRAST  11/2006    Slight prominence/tortuosity pancreatic duct , 2 small retroperitoneal areas with increased density (stable)     HC MRI CERVICAL SPINE W/O CONTRAST       HC MRI LUMBAR SPINE W/O CONTRAST  10/04    L5-S1 mild-mod,L3-4/L4-5 mod. bilat foraminal stenosis ,  L3-4/ L4-5, mod- sev. 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      Social History Main Topics     Smoking status: Never Smoker     Smokeless tobacco: Never Used     Alcohol use No     Drug use: No     Sexual activity: No      Comment:      Other Topics Concern      Service No     Blood Transfusions No     Exercise Yes     WORKS , GOES TO PT     Social History Narrative    SPEAKS NO ENGLISH    DAUGHTER ACCOMPANIES HER FOR APPTS     3 CHILDREN    LIVES WITH HER DAUGHTER              Current Outpatient Prescriptions   Medication Sig Dispense Refill     predniSONE (DELTASONE) 20 MG tablet Take 1 tablet (20 mg) by mouth 2 times daily 10 tablet 0     IBANdronate (BONIVA) 150 MG tablet TAKE 1 TABLET BY MOUTH EVERY 30 DAYS 1 HR BEFORE BREAKFAST WITH 8OZ OF WATER AND SIT UPRIGHT 30 MIN 3 tablet 3     Ergocalciferol (VITAMIN D2 PO) Take 1.25 mg by mouth once a week       omeprazole (PRILOSEC) 20 MG CR capsule TAKE 1 CAPSULE (20 MG) BY MOUTH DAILY 90 capsule 3     traMADol (ULTRAM) 50 MG tablet Take 1 tablet (50 mg) by mouth every 12 hours as needed for pain 60 tablet 3     metoprolol (LOPRESSOR) 25 MG tablet TAKE 1 TABLET (25 MG) BY MOUTH 2 TIMES DAILY 180 tablet 3     lisinopril (PRINIVIL/ZESTRIL) 5 MG tablet Take 1 tablet (5 mg) by mouth daily 90 tablet 3     fluticasone-salmeterol (ADVAIR DISKUS) 250-50 MCG/DOSE diskus inhaler Inhale 1 puff into the lungs 2 times daily 3 Inhaler 0     albuterol (ALBUTEROL) 108 (90 BASE) MCG/ACT Inhaler Inhale 2 puffs into the lungs every 4 hours as needed for shortness of breath / dyspnea 3 Inhaler 3     multivitamin, therapeutic with minerals (THERA-VIT-M) TABS Take 1 tablet by mouth daily       ORDER FOR DME 1 blood pressure cuff, automatic digital reading for home use 1 Device 0     Allergies   Allergen Reactions      Alendronate Sodium      Worse acid reflux     Food      Meat, eggs, dairy     FAMILY HISTORY NOTED AND REVIEWED    REVIEW OF SYSTEMS: above    PHYSICAL EXAM    /79  Pulse 84  Temp 98.1  F (36.7  C) (Oral)  Ht 5' (1.524 m)  Wt 111 lb (50.3 kg)  SpO2 96%  Breastfeeding? No  BMI 21.68 kg/m2    Patient appears non toxic  Tympanic membranes and canals: within normal limits bilaterally.   Mouth: Posterior pharynx, mucous membranes and tongue exam within normal limits.  Neck: supple, no nuchal rigidity or masses.  No anterior or posterior cervical adenopathy.    Lungs: normal flow, no crackles, occasionally end inspir wheeze  cv reglar rate and rhythm, 1/6 sm, no jvp    ASSESSMENT:  Cough with viral uri and asthma exacerbation.  I doubt bacterial, sinusitis or pneumonia.  I doubt this is cv      PLAN:  Cont advair bid and albuterol prn  Prednisone 20mg bid for 5 days  If worsens or not resolving soon to call    Alok Martinez M.D.

## 2017-09-28 NOTE — NURSING NOTE
Chief Complaint   Patient presents with     URI       Initial /79  Pulse 84  Temp 98.1  F (36.7  C) (Oral)  Ht 5' (1.524 m)  Wt 111 lb (50.3 kg)  SpO2 96%  Breastfeeding? No  BMI 21.68 kg/m2 Estimated body mass index is 21.68 kg/(m^2) as calculated from the following:    Height as of this encounter: 5' (1.524 m).    Weight as of this encounter: 111 lb (50.3 kg).  Medication Reconciliation: complete   .Sakina DURAN CMA

## 2017-09-28 NOTE — PATIENT INSTRUCTIONS
Continue the inhalers and use the prednisone as directed.  If you get worse or are not better in the next 3 to 5 days call    Alok Martinez M.D.

## 2017-09-28 NOTE — MR AVS SNAPSHOT
"              After Visit Summary   9/28/2017    Wilma Lorenzana    MRN: [id number]           Patient Information     Date Of Birth          1940        Visit Information        Provider Department      9/28/2017 1:15 PM Alok Martinez MD; [facility]        Today's Diagnoses     Cough    -  1    Viral URI        Intermittent asthma, with acute exacerbation          Care Instructions    Continue the inhalers and use the prednisone as directed.  If you get worse or are not better in the next 3 to 5 days call    Alok Martinez M.D.            Follow-ups after your visit        Who to contact     If you have questions or need follow up information about today's clinic visit or your schedule please contact [facility] directly at [phone].  Normal or non-critical lab and imaging results will be communicated to you by MyChart, letter or phone within 4 business days after the clinic has received the results. If you do not hear from us within 7 days, please contact the clinic through MyChart or phone. If you have a critical or abnormal lab result, we will notify you by phone as soon as possible.  Submit refill requests through Mumboe or call your pharmacy and they will forward the refill request to us. Please allow 3 business days for your refill to be completed.          Additional Information About Your Visit        MyChart Information     Mumboe lets you send messages to your doctor, view your test results, renew your prescriptions, schedule appointments and more. To sign up, go to [email or web address] . Click on \"Log in\" on the left side of the screen, which will take you to the Welcome page. Then click on \"Sign up Now\" on the right side of the page.     You will be asked to enter the access code listed below, as well as some personal information. Please follow the directions to create your username and password.     Your access code is: [email or web address]  Expires: " 2017  1:54 PM     Your access code will  in 90 days. If you need help or a new code, please call your [place] clinic or [phone].        Care EveryWhere ID     This is your Care EveryWhere ID. This could be used by other organizations to access your [place] medical records  [id number]        Your Vitals Were     Pulse Temperature Height Pulse Oximetry Breastfeeding? BMI (Body Mass Index)    84 98.1  F (36.7  C) (Oral) 5' (1.524 m) 96% No 21.68 kg/m2       Blood Pressure from Last 3 Encounters:   17 140/79   17 139/66   17 146/60    Weight from Last 3 Encounters:   17 111 lb (50.3 kg)   17 111 lb (50.3 kg)   17 117 lb (53.1 kg)              Today, you had the following     No orders found for display         Today's Medication Changes          These changes are accurate as of: 17  1:55 PM.  If you have any questions, ask your nurse or doctor.               Start taking these medicines.        Dose/Directions    predniSONE 20 MG tablet   Commonly known as:  DELTASONE   Used for:  Intermittent asthma, with acute exacerbation, Cough   Started by:  Alok Martinez MD        Dose:  20 mg   Take 1 tablet (20 mg) by mouth 2 times daily   Quantity:  10 tablet   Refills:  0            Where to get your medicines      These medications were sent to [facility]/pharmacy #[id number] - [place], [place] - [address] [address]     Phone:  [phone]     predniSONE 20 MG tablet                Primary Care Provider Office Phone # Fax #    Lavelle Aj [phone] [address]   [facility] [address]        Equal Access to Services     [place] : Bárbara Andrade, ahmet benjamin, kristen kaalmayamil reyna. So [facility] [phone].    ATENCIÓN: Si habla español, tiene a strauss disposición servicios gratuitos de asistencia lingüística. Llame al [phone].    We  comply with applicable federal civil rights laws and [place] laws. We do not discriminate on the basis of race, color, national origin, age, disability sex, sexual orientation or gender identity.            Thank you!     Thank you for choosing [facility]  for your care. Our goal is always to provide you with excellent care. Hearing back from our patients is one way we can continue to improve our services. Please take a few minutes to complete the written survey that you may receive in the mail after your visit with us. Thank you!             Your Updated Medication List - Protect others around you: Learn how to safely use, store and throw away your medicines at www.disposemymeds.org.          This list is accurate as of: 9/28/17  1:55 PM.  Always use your most recent med list.                   Brand Name Dispense Instructions for use Diagnosis    albuterol 108 (90 BASE) MCG/ACT Inhaler    PROAIR HFA    3 Inhaler    Inhale 2 puffs into the lungs every 4 hours as needed for shortness of breath / dyspnea    Mild persistent asthma without complication       fluticasone-salmeterol 250-50 MCG/DOSE diskus inhaler    ADVAIR DISKUS    3 Inhaler    Inhale 1 puff into the lungs 2 times daily    Mild persistent asthma without complication       IBANdronate 150 MG tablet    BONIVA    3 tablet    TAKE 1 TABLET BY MOUTH EVERY 30 DAYS 1 HR BEFORE BREAKFAST WITH 8OZ OF WATER AND SIT UPRIGHT 30 MIN    Senile osteoporosis       lisinopril 5 MG tablet    PRINIVIL/ZESTRIL    90 tablet    Take 1 tablet (5 mg) by mouth daily    Essential hypertension       metoprolol 25 MG tablet    LOPRESSOR    180 tablet    TAKE 1 TABLET (25 MG) BY MOUTH 2 TIMES DAILY    Essential hypertension       multivitamin, therapeutic with minerals Tabs tablet      Take 1 tablet by mouth daily        omeprazole 20 MG CR capsule    priLOSEC    90 capsule    TAKE 1 CAPSULE (20 MG) BY MOUTH DAILY    Gastroesophageal reflux disease without esophagitis        order for DME     1 Device    1 blood pressure cuff, automatic digital reading for home use    Benign hypertension       predniSONE 20 MG tablet    DELTASONE    10 tablet    Take 1 tablet (20 mg) by mouth 2 times daily    Intermittent asthma, with acute exacerbation, Cough       traMADol 50 MG tablet    ULTRAM    60 tablet    Take 1 tablet (50 mg) by mouth every 12 hours as needed for pain    Degeneration of lumbar or lumbosacral intervertebral disc, Cervicalgia       VITAMIN D2 PO      Take 1.25 mg by mouth once a week

## 2017-10-04 NOTE — PROGRESS NOTES
Per [facility] [employer], 4 pronged cane was delivered on 09/27/2017.    Vidya Brooks  Case Management Specialist  [facility]   [phone]

## 2017-10-10 ENCOUNTER — CARE COORDINATION (OUTPATIENT)
Dept: GERIATRIC MEDICINE | Facility: CLINIC | Age: 77
End: 2017-10-10

## 2017-10-10 NOTE — PROGRESS NOTES
Call from daughter about cane that client received. Client reports that the cane is too heavy, and is asking for a single prong cane. CM contacted [facility] and they will exchange the 4 prong cane for a single prong cane. They will contact daughter to schedule the swap- potentially Wednesday or Friday this week. Daughter requesting another MM application be sent. CMS mailed MM application. Called daughter to provide update. No answer and could not leave a VM.     Jodi Ingram RN  [facility]  [phone]  Fax: [phone]

## 2017-10-11 ENCOUNTER — MEDICAL CORRESPONDENCE (OUTPATIENT)
Dept: HEALTH INFORMATION MANAGEMENT | Facility: CLINIC | Age: 77
End: 2017-10-11

## 2017-10-20 ENCOUNTER — CARE COORDINATION (OUTPATIENT)
Dept: GERIATRIC MEDICINE | Facility: CLINIC | Age: 77
End: 2017-10-20

## 2017-10-20 NOTE — PROGRESS NOTES
Several attempts to contact daughter in regards to cane and TENS unit. No answer and VM is full. Client will require a face-to-face visit before insurance will pay for TENS unit.     Email received from [facility] that they have made several attempts to deliver new cane and no one answers the door, or phone calls:      We went to the clients home on 10/11/17, there was no answer when we knocked on the door.    We called on 10/13/17 and left a voice mail.    We called on 10/16/17 and the voice mail was full.      Jodi Ingram RN  [facility]  [phone]  Fax: [phone]

## 2017-10-23 ENCOUNTER — CARE COORDINATION (OUTPATIENT)
Dept: GERIATRIC MEDICINE | Facility: CLINIC | Age: 77
End: 2017-10-23

## 2017-10-23 NOTE — PROGRESS NOTES
VM from daughter stating that she hasn't received [employer] application. Return call to daughter and shared that this was mailed for the second time on 10/10/17. Confirmed that we had the correct mailing address. Emailed daughter an application and provided website. Informed daughter that [facility] has been trying to connect with her for over a week to schedule cane replacement, but they have not been able to leave a VM. Daughter stated that tomorrow would be a good day to talk with them as she will not be at work. CM emailed [facility] asking that they call daughter tomorrow. Daughter was also informed that in order for insurance to pay for the TENS unit client would need a face to face visit with her doctor. Daughter said this was no problem and she will schedule an appointment.     Jodi Ingram RN  [facility]  [phone]  Fax: [phone]

## 2017-11-10 ENCOUNTER — TELEPHONE (OUTPATIENT)
Dept: FAMILY MEDICINE | Facility: CLINIC | Age: 77
End: 2017-11-10

## 2017-11-10 NOTE — TELEPHONE ENCOUNTER
Reason for Call:  Form, our goal is to have forms completed with 72 hours, however, some forms may require a visit or additional information.    Type of letter, form or note:  Transportation    Who is the form from?: Patient    Where did the form come from: Patient or family brought in       What clinic location was the form placed at?: [facility]    Where the form was placed: 's Box    What number is listed as a contact on the form?: [phone]     Additional comments: please call pt when finished    Call taken on 11/10/2017 at 3:26 PM by Nevaeh Moody

## 2017-11-15 NOTE — TELEPHONE ENCOUNTER
Patient has not heard anything about these forms being completed. Reportedly placed in the doctor's basket.     Dr Aj, did you completed transportation forms for this patient? Did you by chance give them to someone?    Yonathan Burks CMA

## 2017-11-17 ENCOUNTER — OFFICE VISIT (OUTPATIENT)
Dept: FAMILY MEDICINE | Facility: CLINIC | Age: 77
End: 2017-11-17
Payer: COMMERCIAL

## 2017-11-17 VITALS
WEIGHT: 114 LBS | HEIGHT: 60 IN | BODY MASS INDEX: 22.38 KG/M2 | SYSTOLIC BLOOD PRESSURE: 126 MMHG | OXYGEN SATURATION: 97 % | HEART RATE: 56 BPM | DIASTOLIC BLOOD PRESSURE: 60 MMHG | TEMPERATURE: 98 F

## 2017-11-17 DIAGNOSIS — I34.0 MITRAL VALVE INSUFFICIENCY, UNSPECIFIED ETIOLOGY: ICD-10-CM

## 2017-11-17 DIAGNOSIS — H90.3 BILATERAL SENSORINEURAL HEARING LOSS: ICD-10-CM

## 2017-11-17 DIAGNOSIS — M54.2 CERVICALGIA: Primary | ICD-10-CM

## 2017-11-17 PROCEDURE — 99214 OFFICE O/P EST MOD 30 MIN: CPT | Performed by: INTERNAL MEDICINE

## 2017-11-17 NOTE — PATIENT INSTRUCTIONS
(M54.2) Cervicalgia  (primary encounter diagnosis)  Comment: We will request TENS unit today  Plan: order for DME            (H90.3) Bilateral sensorineural hearing loss  Comment: recommend referral to ENT  Plan: OTOLARYNGOLOGY REFERRAL           (I34.0) Mitral valve insufficiency, unspecified etiology  Comment: Doing well - stable - noted recent consultation with cardiology and recommendation to follow up in 1 year - [facility] - [phone]   Plan:

## 2017-11-17 NOTE — NURSING NOTE
Chief Complaint   Patient presents with     RECHECK     Forms     [employer]       Initial /60 (BP Location: Left arm, Cuff Size: Adult Regular)  Pulse 56  Temp 98  F (36.7  C) (Oral)  Ht 5' (1.524 m)  Wt 114 lb (51.7 kg)  SpO2 97%  BMI 22.26 kg/m2 Estimated body mass index is 22.26 kg/(m^2) as calculated from the following:    Height as of this encounter: 5' (1.524 m).    Weight as of this encounter: 114 lb (51.7 kg).  Medication Reconciliation: complete     Macie Rodriguez MA

## 2017-11-17 NOTE — MR AVS SNAPSHOT
After Visit Summary   11/17/2017    Wilma Lorenzana    MRN: [id number]           Patient Information     Date Of Birth          1940        Visit Information        Provider Department      11/17/2017 4:00 PM Lavelle Aj MD [facility]        Today's Diagnoses     Cervicalgia    -  1    Bilateral sensorineural hearing loss        Mitral valve insufficiency, unspecified etiology          Care Instructions    (M54.2) Cervicalgia  (primary encounter diagnosis)  Comment: We will request TENS unit today  Plan: order for DME            (H90.3) Bilateral sensorineural hearing loss  Comment: recommend referral to ENT  Plan: OTOLARYNGOLOGY REFERRAL           (I34.0) Mitral valve insufficiency, unspecified etiology  Comment: Doing well - stable - noted recent consultation with cardiology and recommendation to follow up in 1 year - [facility] - [phone]   Plan:              Follow-ups after your visit        Additional Services     OTOLARYNGOLOGY REFERRAL       Your provider has referred you to: [facility]: [place] - [place] [phone]   [email or web address]    Please be aware that coverage of these services is subject to the terms and limitations of your health insurance plan.  Call member services at your health plan with any benefit or coverage questions.      Please bring the following with you to your appointment:    (1) Any X-Rays, CTs or MRIs which have been performed.  Contact the facility where they were done to arrange for  prior to your scheduled appointment.   (2) List of current medications  (3) This referral request   (4) Any documents/labs given to you for this referral                  Who to contact     If you have questions or need follow up information about today's clinic visit or your schedule please contact [facility] directly at [phone].  Normal or non-critical lab and imaging results will be  "communicated to you by MyChart, letter or phone within 4 business days after the clinic has received the results. If you do not hear from us within 7 days, please contact the clinic through RockBee or phone. If you have a critical or abnormal lab result, we will notify you by phone as soon as possible.  Submit refill requests through RockBee or call your pharmacy and they will forward the refill request to us. Please allow 3 business days for your refill to be completed.          Additional Information About Your Visit        RockBee Information     RockBee lets you send messages to your doctor, view your test results, renew your prescriptions, schedule appointments and more. To sign up, go to [email or web address] . Click on \"Log in\" on the left side of the screen, which will take you to the Welcome page. Then click on \"Sign up Now\" on the right side of the page.     You will be asked to enter the access code listed below, as well as some personal information. Please follow the directions to create your username and password.     Your access code is: [email or web address]  Expires: 2017 12:54 PM     Your access code will  in 90 days. If you need help or a new code, please call your [place] clinic or [phone].        Care EveryWhere ID     This is your Care EveryWhere ID. This could be used by other organizations to access your [place] medical records  [id number]        Your Vitals Were     Pulse Temperature Height Pulse Oximetry BMI (Body Mass Index)       56 98  F (36.7  C) (Oral) 5' (1.524 m) 97% 22.26 kg/m2        Blood Pressure from Last 3 Encounters:   17 126/60   17 140/79   17 139/66    Weight from Last 3 Encounters:   17 114 lb (51.7 kg)   17 111 lb (50.3 kg)   17 111 lb (50.3 kg)              We Performed the Following     OTOLARYNGOLOGY REFERRAL          Today's Medication Changes          These changes are accurate as of: 17  4:51 PM.  If you have " any questions, ask your nurse or doctor.               Start taking these medicines.        Dose/Directions    order for DME   Used for:  Cervicalgia   Started by:  Lavelle Aj MD        Equipment being ordered: TENS   Quantity:  1 each   Refills:  0         Stop taking these medicines if you haven't already. Please contact your care team if you have questions.     predniSONE 20 MG tablet   Commonly known as:  DELTASONE   Stopped by:  Lavelle Aj MD                Where to get your medicines      Some of these will need a paper prescription and others can be bought over the counter.  Ask your nurse if you have questions.     Bring a paper prescription for each of these medications     order for DME                Primary Care Provider Office Phone # Fax #    Lavelle Aj [phone] [address] [address]        Equal Access to Services     [facility]: Hadii edward sahu hadasho Sorebekah, waaxda luqadaha, qaybta kaalmada adesaulyaulises, yamil boudreaux . So [facility] [phone].    ATENCIÓN: Si habla español, tiene a strauss disposición servicios gratuitos de asistencia lingüística. [facility] [phone].    We comply with applicable federal civil rights laws and [place] laws. We do not discriminate on the basis of race, color, national origin, age, disability, sex, sexual orientation, or gender identity.            Thank you!     Thank you for choosing [facility]  for your care. Our goal is always to provide you with excellent care. Hearing back from our patients is one way we can continue to improve our services. Please take a few minutes to complete the written survey that you may receive in the mail after your visit with us. Thank you!             Your Updated Medication List - Protect others around you: Learn how to safely use, store and throw away your medicines at www.disposemymeds.org.          This list is  accurate as of: 11/17/17  4:51 PM.  Always use your most recent med list.                   Brand Name Dispense Instructions for use Diagnosis    albuterol 108 (90 BASE) MCG/ACT Inhaler    PROAIR HFA    3 Inhaler    Inhale 2 puffs into the lungs every 4 hours as needed for shortness of breath / dyspnea    Mild persistent asthma without complication       fluticasone-salmeterol 250-50 MCG/DOSE diskus inhaler    ADVAIR DISKUS    3 Inhaler    Inhale 1 puff into the lungs 2 times daily    Mild persistent asthma without complication       IBANdronate 150 MG tablet    BONIVA    3 tablet    TAKE 1 TABLET BY MOUTH EVERY 30 DAYS 1 HR BEFORE BREAKFAST WITH 8OZ OF WATER AND SIT UPRIGHT 30 MIN    Senile osteoporosis       lisinopril 5 MG tablet    PRINIVIL/ZESTRIL    90 tablet    Take 1 tablet (5 mg) by mouth daily    Essential hypertension       metoprolol 25 MG tablet    LOPRESSOR    180 tablet    TAKE 1 TABLET (25 MG) BY MOUTH 2 TIMES DAILY    Essential hypertension       multivitamin, therapeutic with minerals Tabs tablet      Take 1 tablet by mouth daily        omeprazole 20 MG CR capsule    priLOSEC    90 capsule    TAKE 1 CAPSULE (20 MG) BY MOUTH DAILY    Gastroesophageal reflux disease without esophagitis       order for DME     1 Device    1 blood pressure cuff, automatic digital reading for home use    Benign hypertension       order for DME     1 each    Equipment being ordered: TENS    Cervicalgia       traMADol 50 MG tablet    ULTRAM    60 tablet    Take 1 tablet (50 mg) by mouth every 12 hours as needed for pain    Degeneration of lumbar or lumbosacral intervertebral disc, Cervicalgia       VITAMIN D2 PO      Take 1.25 mg by mouth once a week

## 2017-11-17 NOTE — PROGRESS NOTES
[facility]  CLINIC PROGRESS NOTE    Subjective:  Hearing loss   Wilma Lorenzana has had some difficulty hearing in the left ear with some pain.  When she chews she may hear some noise.  She has not seen an ENT for this concern.  Neck pain   Wilma Lorenzana has continued to have difficulty with neck pain.  She has continued to do physical therapy and was recommended to consider TENS unit.        Past medical history, medications, allergies, social history, family history reviewed and updated in [facility] as of 11/17/2017 .    ROS  CONSTITUTIONAL: no fatigue, no unexpected change in weight  SKIN: no worrisome rashes, no worrisome moles, no worrisome lesions  EYES: no acute vision problems or changes  ENT: as above   RESP: no significant cough, no shortness of breath  CV: no chest pain, no palpitations, no new or worsening peripheral edema  GI: no nausea, no vomiting, no constipation, no diarrhea  :  no dysuria, no hematuria  MS: neck stiffness as above   PSYCHIATRIC: no changes in mood or affect      Objective:  Vitals  /60 (BP Location: Left arm, Cuff Size: Adult Regular)  Pulse 56  Temp 98  F (36.7  C) (Oral)  Ht 5' (1.524 m)  Wt 114 lb (51.7 kg)  SpO2 97%  BMI 22.26 kg/m2  GEN: Alert Oriented x3 NAD  HEENT: Atraumatic, normocephalic, neck supple, no thyromegaly, negative cervical adenopathy  TM: TM bilaterally pearly and grey with normal light reflex  CV: RRR 2/6 systolic murmur or rubs  PULM: CTA no wheezes or crackles  ABD: Soft, nontender nondistended, no hepatosplenomegally  SKIN: No visible skin lesion or ulcerations  EXT: 2+ dorsal pedis pulses, no edema bilateral lower extremities  NEURO: Gait and station deferred, No focal neurologic deficits  PSYCH: Mood good, affect mood congruent    No images are attached to the encounter.    No results found for this or any previous visit (from the past 24 hour(s)).    Assessment/Plan:  Patient Instructions   (M54.2) Cervicalgia  (primary  encounter diagnosis)  Comment: We will request TENS unit today  Plan: order for DME            (H90.3) Bilateral sensorineural hearing loss  Comment: recommend referral to ENT  Plan: OTOLARYNGOLOGY REFERRAL           (I34.0) Mitral valve insufficiency, unspecified etiology  Comment: Doing well - stable - noted recent consultation with cardiology and recommendation to follow up in 1 year - [facility] - [phone]   Plan:        Follow up in 3 months     25 minutes spent with patient.  Over 50% of time counseling       Disclaimer: This note consists of symbols derived from keyboarding, dictation and/or voice recognition software. As a result, there may be errors in the script that have gone undetected. Please consider this when interpreting information found in this chart.    Lavelle Aj MD  [phone]

## 2017-11-20 ENCOUNTER — DOCUMENTATION ONLY (OUTPATIENT)
Dept: OTHER | Facility: CLINIC | Age: 77
End: 2017-11-20

## 2017-11-20 DIAGNOSIS — Z71.89 ADVANCED DIRECTIVES, COUNSELING/DISCUSSION: Chronic | ICD-10-CM

## 2017-12-15 ENCOUNTER — TELEPHONE (OUTPATIENT)
Dept: FAMILY MEDICINE | Facility: CLINIC | Age: 77
End: 2017-12-15

## 2017-12-15 NOTE — TELEPHONE ENCOUNTER
I got notification that a form from a medical supply company will be coming for the DME order on the TENS unit. They will be faxing it to [phone] with attn to me     Yonathan Burks, [facility]

## 2017-12-29 DIAGNOSIS — J45.30 MILD PERSISTENT ASTHMA WITHOUT COMPLICATION: ICD-10-CM

## 2017-12-29 NOTE — TELEPHONE ENCOUNTER
Requested Prescriptions   Pending Prescriptions Disp Refills     fluticasone-salmeterol (ADVAIR) 250-50 MCG/DOSE diskus inhaler [Pharmacy Med Name: ADVAIR 250-50 DISKUS]  Last Written Prescription Date:  12/7/16  Last Fill Quantity: 3 inhaler,  # refills: 0   Last Office Visit with [facility] or [facility] prescribing provider:  11/17/17   Future Office Visit:      60 Inhaler 1     Sig: INHALE 1 PUFF BY MOUTH EVERY 12 HOURS, IN THE MORNING AND IN THE EVENING, ABOUT 12 HOURS APART    Inhaled Steroids Protocol Failed    12/29/2017  9:27 AM       Failed - Asthma control test 20 or greater in last 6 months    Please review ACT score.   ACT Total Scores 12/4/2012 6/7/2013 8/26/2014   ACT TOTAL SCORE 17 13 13   ASTHMA ER VISITS 0 = None 0 = None 0 = None   ASTHMA HOSPITALIZATIONS 0 = None 0 = None 0 = None            Passed - Patient is age 12 or older       Passed - Recent (6 mo) or future visit with authorizing provider's specialty    Patient had office visit in the last 6 months or has a visit in the next 30 days with authorizing provider.  See chart review.

## 2018-01-08 ENCOUNTER — TELEPHONE (OUTPATIENT)
Dept: FAMILY MEDICINE | Facility: CLINIC | Age: 78
End: 2018-01-08

## 2018-01-08 NOTE — TELEPHONE ENCOUNTER
Reason for Call:  Request to change pharmacy to [facility] for future prescriptions.    Also, please fill scripts for 90 days whenever possbile.    Phone Number Patient can be reached at: [phone]    Best Time: anytime    Can we leave a detailed message on this number? YES    Call taken on 1/8/2018 at 2:30 PM by Cira Jeffries    .

## 2018-02-16 DIAGNOSIS — M54.2 CERVICALGIA: ICD-10-CM

## 2018-02-16 DIAGNOSIS — M51.379 DEGENERATION OF LUMBAR OR LUMBOSACRAL INTERVERTEBRAL DISC: ICD-10-CM

## 2018-02-16 RX ORDER — TRAMADOL HYDROCHLORIDE 50 MG/1
TABLET ORAL
Qty: 60 TABLET | Refills: 0 | Status: SHIPPED | OUTPATIENT
Start: 2018-02-16 | End: 2018-03-15

## 2018-02-16 NOTE — TELEPHONE ENCOUNTER
Controlled Substance Refill Request for traMADol (ULTRAM) 50 MG tablet    Problem List Complete:  No     PROVIDER TO CONSIDER COMPLETION OF PROBLEM LIST AND OVERVIEW/CONTROLLED SUBSTANCE AGREEMENT    Last Written Prescription Date:  6/21/2017  Last Fill Quantity: 60,   # refills: 3    Last Office Visit with [facility] primary care provider: 11/17/2017    Future Office visit:     Controlled substance agreement on file: No.     Processing:  Fax Rx to [facility] pharmacy   checked in past 6 months?  No, route to RN

## 2018-03-15 DIAGNOSIS — M51.379 DEGENERATION OF LUMBAR OR LUMBOSACRAL INTERVERTEBRAL DISC: ICD-10-CM

## 2018-03-15 DIAGNOSIS — M54.2 CERVICALGIA: ICD-10-CM

## 2018-03-15 DIAGNOSIS — J45.30 MILD PERSISTENT ASTHMA WITHOUT COMPLICATION: ICD-10-CM

## 2018-03-15 DIAGNOSIS — I10 ESSENTIAL HYPERTENSION: ICD-10-CM

## 2018-03-15 DIAGNOSIS — G89.4 CHRONIC PAIN SYNDROME: ICD-10-CM

## 2018-03-16 PROBLEM — G89.4 CHRONIC PAIN SYNDROME: Status: ACTIVE | Noted: 2018-03-16

## 2018-03-16 RX ORDER — METOPROLOL TARTRATE 25 MG/1
TABLET, FILM COATED ORAL
Start: 2018-03-16

## 2018-03-16 RX ORDER — LISINOPRIL 5 MG/1
TABLET ORAL
Start: 2018-03-16

## 2018-03-16 RX ORDER — TRAMADOL HYDROCHLORIDE 50 MG/1
TABLET ORAL
Qty: 60 TABLET | Refills: 0 | Status: SHIPPED | OUTPATIENT
Start: 2018-03-16 | End: 2018-05-22

## 2018-03-16 NOTE — TELEPHONE ENCOUNTER
"   Disp Refills Start End JUAN   fluticasone-salmeterol (ADVAIR) 250-50 MCG/DOSE diskus inhaler 60 Inhaler 1 1/2/2018  No   Sig: INHALE 1 PUFF BY MOUTH EVERY 12 HOURS, IN THE MORNING AND IN THE EVENING, ABOUT 12 HOURS APART       Last Written Prescription Date:  01/02/2018  Last Fill Quantity: 60 inahlers (?) ,  # refills: 1   Last office visit: 11/17/2017 with prescribing provider:     Future Office Visit:        traMADol (ULTRAM) 50 MG tablet 60 tablet 0 2/16/2018  No   Sig: TAKE 1 TABLET BY MOUTH EVERY 12 HOURS AS NEEDED FOR PAIN.       Last Written Prescription Date:  02/16/2018  Last Fill Quantity: 60 ,  # refills: 0   Last office visit: 11/17/2017 with prescribing provider:     Future Office Visit:        Requested Prescriptions   Pending Prescriptions Disp Refills     ADVAIR DISKUS 250-50 MCG/DOSE diskus inhaler [Pharmacy Med Name: ADVAIR 250-50 DISKUS]  1     Sig: INHALE 1 PUFF BY MOUTH EVERY 12 HOURS, IN THE MORNING AND IN THE EVENING, ABOUT 12 HOURS APART    Inhaled Steroids Protocol Failed    3/16/2018 10:13 AM       Failed - Asthma control assessment score within normal limits in last 6 months    Please review ACT score.          Passed - Patient is age 12 or older       Passed - Recent (6 mo) or future (30 days) visit within the authorizing provider's specialty    Patient had office visit in the last 6 months or has a visit in the next 30 days with authorizing provider or within the authorizing provider's specialty.  See \"Patient Info\" tab in inbasket, or \"Choose Columns\" in Meds & Orders section of the refill encounter.            traMADol (ULTRAM) 50 MG tablet [Pharmacy Med Name: TRAMADOL HCL 50 MG TABLET] 60 tablet 0     Sig: TAKE 1 TABLET BY MOUTH EVERY 12 HOURS AS NEEDED FOR PAIN    There is no refill protocol information for this order      Refused Prescriptions Disp Refills     lisinopril (PRINIVIL/ZESTRIL) 5 MG tablet [Pharmacy Med Name: LISINOPRIL 5 MG TABLET]       Sig: TAKE 1 TABLET (5 MG) BY " "MOUTH DAILY    ACE Inhibitors (Including Combos) Protocol Passed    3/16/2018 10:13 AM       Passed - Blood pressure under 140/90 in past 12 months    BP Readings from Last 3 Encounters:   11/17/17 126/60   09/28/17 140/79   09/14/17 139/66                Passed - Recent (12 mo) or future (30 days) visit within the authorizing provider's specialty    Patient had office visit in the last 12 months or has a visit in the next 30 days with authorizing provider or within the authorizing provider's specialty.  See \"Patient Info\" tab in inbasket, or \"Choose Columns\" in Meds & Orders section of the refill encounter.           Passed - Patient is age 18 or older       Passed - No active pregnancy on record       Passed - Normal serum creatinine on file in past 12 months    Recent Labs   Lab Test  06/21/17   1034   CR  0.65            Passed - Normal serum potassium on file in past 12 months    Recent Labs   Lab Test  06/21/17   1034   POTASSIUM  4.3            Passed - No positive pregnancy test in past 12 months        metoprolol tartrate (LOPRESSOR) 25 MG tablet [Pharmacy Med Name: METOPROLOL TARTRATE 25 MG TAB]       Sig: TAKE 1 TABLET (25 MG) BY MOUTH 2 TIMES DAILY    Beta-Blockers Protocol Passed    3/16/2018 10:13 AM       Passed - Blood pressure under 140/90 in past 12 months    BP Readings from Last 3 Encounters:   11/17/17 126/60   09/28/17 140/79   09/14/17 139/66                Passed - Patient is age 6 or older       Passed - Recent (12 mo) or future (30 days) visit within the authorizing provider's specialty    Patient had office visit in the last 12 months or has a visit in the next 30 days with authorizing provider or within the authorizing provider's specialty.  See \"Patient Info\" tab in inbasket, or \"Choose Columns\" in Meds & Orders section of the refill encounter.            "

## 2018-03-16 NOTE — TELEPHONE ENCOUNTER
ACT Total Scores 12/4/2012 6/7/2013 8/26/2014   ACT TOTAL SCORE 17 13 13   ASTHMA ER VISITS 0 = None 0 = None 0 = None   ASTHMA HOSPITALIZATIONS 0 = None 0 = None 0 = None     Patient is followed by Lavelle Aj MD, MD for ongoing prescription of pain medication.  All refills should only be approved by this provider, or covering partner.    Medication(s): Tramadol.   Maximum quantity per month: 60  Clinic visit frequency required: Q 6  months     Controlled substance agreement:  Encounter-Level CSA:     There are no encounter-level csa.        Pain Clinic evaluation in the past: No    DIRE Total Score(s):  No flowsheet data found.    Last [facility] website verification:  none-unable to locate patient in  database   [email or web address]

## 2018-04-06 ENCOUNTER — PATIENT OUTREACH (OUTPATIENT)
Dept: GERIATRIC MEDICINE | Facility: CLINIC | Age: 78
End: 2018-04-06

## 2018-04-06 NOTE — PROGRESS NOTES
[facility] Care Coordination Contact    Call to daughter to complete six month assessment. VM was left requesting a return call.     Jodi Ingram RN  [facility]  [phone]  Fax: [phone]

## 2018-04-09 NOTE — PROGRESS NOTES
Call to daughter to complete 6 month assessment. Phone goes directly to . Message left requesting a return call.     Jodi Ingram RN  [facility]  [phone]  Fax: [phone]

## 2018-04-10 ENCOUNTER — PATIENT OUTREACH (OUTPATIENT)
Dept: GERIATRIC MEDICINE | Facility: CLINIC | Age: 78
End: 2018-04-10

## 2018-04-10 NOTE — PROGRESS NOTES
Third call to daughter to complete six month assessment. Phone goes directly to - message was left requesting a return call.     Will request that CMS send [facility] letter.    Jodi Ingram RN  [facility]  [phone]  Fax: [phone]

## 2018-04-10 NOTE — PROGRESS NOTES
"[facility] Care Coordination Contact  Per CC, mailed client an \"Unable to Contact\" letter.    Charlotte Guidry  Case Management Specialist  [facility]  [phone]      "

## 2018-04-12 NOTE — PROGRESS NOTES
Return call from daughter.     [facility] Six-Month Telephone Assessment    6 month telephone assessment completed on 4/12/18. Daughter reports that client is doing well without any significant changes. Daughter asking about TENS unit that was ordered at annual assessment and CM reported that insurance coverage was denied due to the diagnosis the MD used. Daughter encouraged to schedule another appointment (last appointment was in October) if they would like to try again. Discussed private pay options.     ER visits: No  Hospitalizations: No  TCU stays: No  Significant health status changes: NO  Falls/Injuries: No  ADL/IADL changes: No  Changes in services: No    Goals: See POC in chart for goal progress documentation.     Will see member in 6 months for an annual health risk assessment.   Encouraged member to call CC with any questions or concerns in the meantime.     Jodi Ingram RN  [facility]  [phone]  Fax: [phone]

## 2018-05-14 ENCOUNTER — TRANSFERRED RECORDS (OUTPATIENT)
Dept: HEALTH INFORMATION MANAGEMENT | Facility: CLINIC | Age: 78
End: 2018-05-14

## 2018-05-14 ENCOUNTER — TELEPHONE (OUTPATIENT)
Dept: FAMILY MEDICINE | Facility: CLINIC | Age: 78
End: 2018-05-14

## 2018-05-14 DIAGNOSIS — J45.30 MILD PERSISTENT ASTHMA WITHOUT COMPLICATION: ICD-10-CM

## 2018-05-15 NOTE — TELEPHONE ENCOUNTER
"Last Written Prescription Date:  1/08/18  Last Fill Quantity: 18 g,  # refills: 2   Last office visit: 11/17/2017 with prescribing provider:  Madai   Future Office Visit:      Requested Prescriptions   Pending Prescriptions Disp Refills     VENTOLIN  (90 Base) MCG/ACT Inhaler [Pharmacy Med Name: VENTOLIN HFA 90 MCG INHALER] 18 Inhaler 2     Sig: INHALE 2 PUFFS BY MOUTH EVERY 4-6 HOURS AS NEEDED    Asthma Maintenance Inhalers - Anticholinergics Failed    5/14/2018  6:43 PM       Failed - Asthma control assessment score within normal limits in last 6 months    Please review ACT score.          Passed - Patient is age 12 years or older       Passed - Recent (6 mo) or future (30 days) visit within the authorizing provider's specialty    Patient had office visit in the last 6 months or has a visit in the next 30 days with authorizing provider or within the authorizing provider's specialty.  See \"Patient Info\" tab in inbasket, or \"Choose Columns\" in Meds & Orders section of the refill encounter.            ACT Total Scores 12/4/2012 6/7/2013 8/26/2014   ACT TOTAL SCORE 17 13 13   ASTHMA ER VISITS 0 = None 0 = None 0 = None   ASTHMA HOSPITALIZATIONS 0 = None 0 = None 0 = None       "

## 2018-05-16 RX ORDER — ALBUTEROL SULFATE 90 UG/1
AEROSOL, METERED RESPIRATORY (INHALATION)
Qty: 18 G | Refills: 1 | Status: SHIPPED | OUTPATIENT
Start: 2018-05-16 | End: 2018-06-01

## 2018-05-16 NOTE — TELEPHONE ENCOUNTER
Patient due for 6 month follow up with Dr. Aj for asthma follow up    Team Coordinators: Please call the patient to schedule future appt for 6 month asthma follow-up.  Medication approve to pharmacy already.     Bernadette Miller RN

## 2018-05-22 ENCOUNTER — MEDICAL CORRESPONDENCE (OUTPATIENT)
Dept: HEALTH INFORMATION MANAGEMENT | Facility: CLINIC | Age: 78
End: 2018-05-22

## 2018-05-22 ENCOUNTER — OFFICE VISIT (OUTPATIENT)
Dept: FAMILY MEDICINE | Facility: CLINIC | Age: 78
End: 2018-05-22
Payer: COMMERCIAL

## 2018-05-22 VITALS
SYSTOLIC BLOOD PRESSURE: 121 MMHG | WEIGHT: 118 LBS | BODY MASS INDEX: 23.16 KG/M2 | HEART RATE: 60 BPM | DIASTOLIC BLOOD PRESSURE: 58 MMHG | TEMPERATURE: 98.1 F | OXYGEN SATURATION: 98 % | HEIGHT: 60 IN

## 2018-05-22 DIAGNOSIS — E55.9 VITAMIN D DEFICIENCY: ICD-10-CM

## 2018-05-22 DIAGNOSIS — D64.9 ANEMIA, UNSPECIFIED TYPE: ICD-10-CM

## 2018-05-22 DIAGNOSIS — K21.9 GASTROESOPHAGEAL REFLUX DISEASE WITHOUT ESOPHAGITIS: ICD-10-CM

## 2018-05-22 DIAGNOSIS — M81.0 AGE-RELATED OSTEOPOROSIS WITHOUT CURRENT PATHOLOGICAL FRACTURE: ICD-10-CM

## 2018-05-22 DIAGNOSIS — I10 ESSENTIAL HYPERTENSION: ICD-10-CM

## 2018-05-22 DIAGNOSIS — M54.2 CERVICALGIA: ICD-10-CM

## 2018-05-22 DIAGNOSIS — J45.20 INTERMITTENT ASTHMA WITHOUT COMPLICATION, UNSPECIFIED ASTHMA SEVERITY: ICD-10-CM

## 2018-05-22 DIAGNOSIS — M51.379 DEGENERATION OF LUMBAR OR LUMBOSACRAL INTERVERTEBRAL DISC: ICD-10-CM

## 2018-05-22 DIAGNOSIS — J44.9 COPD, MODERATE (H): Primary | ICD-10-CM

## 2018-05-22 LAB
ERYTHROCYTE [DISTWIDTH] IN BLOOD BY AUTOMATED COUNT: 13.9 % (ref 10–15)
HCT VFR BLD AUTO: 36.1 % (ref 35–47)
HGB BLD-MCNC: 11.4 G/DL (ref 11.7–15.7)
MCH RBC QN AUTO: 31.4 PG (ref 26.5–33)
MCHC RBC AUTO-ENTMCNC: 31.6 G/DL (ref 31.5–36.5)
MCV RBC AUTO: 99 FL (ref 78–100)
PLATELET # BLD AUTO: 157 10E9/L (ref 150–450)
RBC # BLD AUTO: 3.63 10E12/L (ref 3.8–5.2)
WBC # BLD AUTO: 3.5 10E9/L (ref 4–11)

## 2018-05-22 PROCEDURE — 82607 VITAMIN B-12: CPT | Performed by: INTERNAL MEDICINE

## 2018-05-22 PROCEDURE — 83540 ASSAY OF IRON: CPT | Performed by: INTERNAL MEDICINE

## 2018-05-22 PROCEDURE — 82306 VITAMIN D 25 HYDROXY: CPT | Performed by: INTERNAL MEDICINE

## 2018-05-22 PROCEDURE — 83550 IRON BINDING TEST: CPT | Performed by: INTERNAL MEDICINE

## 2018-05-22 PROCEDURE — 85027 COMPLETE CBC AUTOMATED: CPT | Performed by: INTERNAL MEDICINE

## 2018-05-22 PROCEDURE — 80048 BASIC METABOLIC PNL TOTAL CA: CPT | Performed by: INTERNAL MEDICINE

## 2018-05-22 PROCEDURE — 36415 COLL VENOUS BLD VENIPUNCTURE: CPT | Performed by: INTERNAL MEDICINE

## 2018-05-22 PROCEDURE — 82728 ASSAY OF FERRITIN: CPT | Performed by: INTERNAL MEDICINE

## 2018-05-22 PROCEDURE — 99214 OFFICE O/P EST MOD 30 MIN: CPT | Performed by: INTERNAL MEDICINE

## 2018-05-22 RX ORDER — METOPROLOL TARTRATE 25 MG/1
TABLET, FILM COATED ORAL
Qty: 180 TABLET | Refills: 3 | Status: SHIPPED | OUTPATIENT
Start: 2018-05-22 | End: 2019-01-28

## 2018-05-22 RX ORDER — TRAMADOL HYDROCHLORIDE 50 MG/1
50 TABLET ORAL EVERY 6 HOURS PRN
Qty: 60 TABLET | Refills: 0 | Status: SHIPPED | OUTPATIENT
Start: 2018-05-22 | End: 2018-09-28

## 2018-05-22 RX ORDER — CHOLECALCIFEROL (VITAMIN D3) 50 MCG
2000 TABLET ORAL DAILY
Qty: 100 TABLET | Refills: 3 | Status: SHIPPED | OUTPATIENT
Start: 2018-05-22 | End: 2020-09-02

## 2018-05-22 RX ORDER — LISINOPRIL 5 MG/1
5 TABLET ORAL DAILY
Qty: 90 TABLET | Refills: 3 | Status: SHIPPED | OUTPATIENT
Start: 2018-05-22 | End: 2019-05-16

## 2018-05-22 NOTE — LETTER
[facility]  [address] [facility]  [address]  [place]  [address]  Tel: [phone]    May 29, 2018    Wilma Lorenzana  [address]  [place] [address]        Dear Ms. Lorenzana,    I had the opportunity to review your recent labs and a summary of your labs reads as follows:    Your complete blood counts show new finding of anemia and low iron  - I would recommend a follow up in the clinic to review this  Your basic metabolic panel shows stable average blood glucose and renal function  Your vitamin D returned within normal limits.    If you have any further questions or problems, please contact our office.      Sincerely,    Lavelle Aj MD/ Fannie Jeter CMA  Results for orders placed or performed in visit on 05/22/18   Vitamin D Deficiency   Result Value Ref Range    Vitamin D Deficiency screening 28 20 - 75 ug/L   Basic metabolic panel   Result Value Ref Range    Sodium 140 133 - 144 mmol/L    Potassium 4.5 3.4 - 5.3 mmol/L    Chloride 106 94 - 109 mmol/L    Carbon Dioxide 30 20 - 32 mmol/L    Anion Gap 4 3 - 14 mmol/L    Glucose 103 (H) 70 - 99 mg/dL    Urea Nitrogen 16 7 - 30 mg/dL    Creatinine 0.62 0.52 - 1.04 mg/dL    GFR Estimate >90 >60 mL/min/1.7m2    GFR Estimate If Black >90 >60 mL/min/1.7m2    Calcium 9.2 8.5 - 10.1 mg/dL   CBC with platelets   Result Value Ref Range    WBC 3.5 (L) 4.0 - 11.0 10e9/L    RBC Count 3.63 (L) 3.8 - 5.2 10e12/L    Hemoglobin 11.4 (L) 11.7 - 15.7 g/dL    Hematocrit 36.1 35.0 - 47.0 %    MCV 99 78 - 100 fl    MCH 31.4 26.5 - 33.0 pg    MCHC 31.6 31.5 - 36.5 g/dL    RDW 13.9 10.0 - 15.0 %    Platelet Count 157 150 - 450 10e9/L               Enclosure: Lab Results

## 2018-05-22 NOTE — PROGRESS NOTES
[facility]  CLINIC PROGRESS NOTE    Subjective:     COPD, moderate (H)  Intermittent asthma without complication, unspecified asthma severity   She is doing very well with current medications of advair and as needed use of albuterol.  Her daughter notes occasional shortness of breath if she is walking too fast.  She is taking advair twice per day as prescribed.  She is taking albuterol up to 3-4 times per day, but usually once per day.      Past medical history, medications, allergies, social history, family history reviewed and updated in [facility] as of 5/22/2018 .    ROS  CONSTITUTIONAL: no fatigue, no unexpected change in weight  SKIN: no worrisome rashes, no worrisome moles, no worrisome lesions  EYES: no acute vision problems or changes  ENT: no ear problems, no mouth problems, no throat problems  RESP: as above no significant cough, no shortness of breath  CV: no chest pain, no palpitations, no new or worsening peripheral edema  GI: no nausea, no vomiting, no constipation, no diarrhea  : no frequency, no dysuria, no hematuria  MS: chronic low back pain - able to walk and using tramadol as needed   PSYCHIATRIC: no changes in mood or affect      Objective:  Vitals  /58 (BP Location: Left arm, Cuff Size: Adult Regular)  Pulse 60  Temp 98.1  F (36.7  C) (Tympanic)  Ht 5' (1.524 m)  Wt 118 lb (53.5 kg)  SpO2 98%  Breastfeeding? No  BMI 23.05 kg/m2  GEN: Alert Oriented x3 NAD  HEENT: Atraumatic, normocephalic, neck supple, no thyromegaly, negative cervical adenopathy  CV: RRR no murmurs or rubs  PULM: CTA no wheezes or crackles  ABD: Soft, nontender nondistended, no hepatosplenomegally  SKIN: No visible skin lesion or ulcerations  EXT:   no edema bilateral lower extremities  NEURO: Gait and station deferred, No focal neurologic deficits  PSYCH: Mood good, affect mood congruent    No images are attached to the encounter.    No results found for this or any previous visit (from the past 24  hour(s)).    Assessment/Plan:  Patient Instructions   (J44.9) COPD, moderate (H)  (primary encounter diagnosis)  Comment:  respiratory therapy - [phone] to schedule a follow up pulmonary function testing and continue current inhalers of advair and albuterol  Plan: Spirometry, Breath Capacity:  Future Order, RT         Performed            (J45.20) Intermittent asthma without complication, unspecified asthma severity  Comment: as above   Plan: Spirometry, Breath Capacity:  Future Order, RT         Performed            (M81.0) Age-related osteoporosis without current pathological fracture  Comment: Recommend repeat bone density scan and we can dicsuss need for conitnued boniva depending.  [facility] (also performs diagnostic mammogram, ultrasound and biopsy) [phone].   Plan: DX Hip/Pelvis/Spine, Vitamin D Deficiency            (I10) Essential hypertension  Comment: blood pressure is well controlled   Plan: lisinopril (PRINIVIL/ZESTRIL) 5 MG tablet,         metoprolol tartrate (LOPRESSOR) 25 MG tablet,         Basic metabolic panel, CBC with platelets            (K21.9) Gastroesophageal reflux disease without esophagitis  Comment: We will refill omeprazole 20 mg daily   Plan: omeprazole (PRILOSEC) 20 MG CR capsule            (M51.37) LUMB/LUMBOSAC DISC DEGEN  Comment: OK to continue tramadol  Plan: traMADol (ULTRAM) 50 MG tablet            (M54.2) Cervicalgia  Comment: As above   Plan: traMADol (ULTRAM) 50 MG tablet            (E55.9) Vitamin D deficiency  Comment: Check vitamin D and start cholecaliferol 2,000 units daily  Plan: Cholecalciferol (VITAMIN D) 2000 units tablet               Follow up in 6 months    Disclaimer: This note consists of symbols derived from keyboarding, dictation and/or voice recognition software. As a result, there may be errors in the script that have gone undetected. Please consider this when interpreting information found in this chart.    Lavelle Aj  MD  [phone]

## 2018-05-22 NOTE — MR AVS SNAPSHOT
After Visit Summary   5/22/2018    Wilma Lorenzana    MRN: [id number]           Patient Information     Date Of Birth          1940        Visit Information        Provider Department      5/22/2018 11:00 AM Lavelle Aj MD [facility]        Today's Diagnoses     COPD, moderate (H)    -  1    Intermittent asthma without complication, unspecified asthma severity        Age-related osteoporosis without current pathological fracture        Essential hypertension        Gastroesophageal reflux disease without esophagitis        LUMB/LUMBOSAC DISC DEGEN        Cervicalgia        Vitamin D deficiency          Care Instructions    (J44.9) COPD, moderate (H)  (primary encounter diagnosis)  Comment:  respiratory therapy - [phone] to schedule a follow up pulmonary function testing and continue current inhalers of advair and albuterol  Plan: Spirometry, Breath Capacity:  Future Order, RT         Performed            (J45.20) Intermittent asthma without complication, unspecified asthma severity  Comment: as above   Plan: Spirometry, Breath Capacity:  Future Order, RT         Performed            (M81.0) Age-related osteoporosis without current pathological fracture  Comment: Recommend repeat bone density scan and we can dicsuss need for conitnued boniva depending.  [facility] (also performs diagnostic mammogram, ultrasound and biopsy) [phone].   Plan: DX Hip/Pelvis/Spine, Vitamin D Deficiency            (I10) Essential hypertension  Comment: blood pressure is well controlled   Plan: lisinopril (PRINIVIL/ZESTRIL) 5 MG tablet,         metoprolol tartrate (LOPRESSOR) 25 MG tablet,         Basic metabolic panel, CBC with platelets            (K21.9) Gastroesophageal reflux disease without esophagitis  Comment: We will refill omeprazole 20 mg daily   Plan: omeprazole (PRILOSEC) 20 MG CR capsule            (M51.37) LUMB/LUMBOSAC DISC DEGEN  Comment: OK  "to continue tramadol  Plan: traMADol (ULTRAM) 50 MG tablet            (M54.2) Cervicalgia  Comment: As above   Plan: traMADol (ULTRAM) 50 MG tablet            (E55.9) Vitamin D deficiency  Comment: Check vitamin D and start cholecaliferol 2,000 units daily  Plan: Cholecalciferol (VITAMIN D) 2000 units tablet                     Follow-ups after your visit        Future tests that were ordered for you today     Open Future Orders        Priority Expected Expires Ordered    DX Hip/Pelvis/Spine Routine 5/22/2018 5/22/2019 5/22/2018    Spirometry, Breath Capacity:  Future Order, RT Performed Routine  7/6/2018 5/22/2018            Who to contact     If you have questions or need follow up information about today's clinic visit or your schedule please contact [facility] directly at [phone].  Normal or non-critical lab and imaging results will be communicated to you by CAD Besthart, letter or phone within 4 business days after the clinic has received the results. If you do not hear from us within 7 days, please contact the clinic through CAD Besthart or phone. If you have a critical or abnormal lab result, we will notify you by phone as soon as possible.  Submit refill requests through Let or call your pharmacy and they will forward the refill request to us. Please allow 3 business days for your refill to be completed.          Additional Information About Your Visit        CAD BestharScalado Information     Let lets you send messages to your doctor, view your test results, renew your prescriptions, schedule appointments and more. To sign up, go to [email or web address] . Click on \"Log in\" on the left side of the screen, which will take you to the Welcome page. Then click on \"Sign up Now\" on the right side of the page.     You will be asked to enter the access code listed below, as well as some personal information. Please follow the directions to create your username and password.     Your access code is: " [email or web address]  Expires: 2018 11:41 AM     Your access code will  in 90 days. If you need help or a new code, please call your [place] clinic or [phone].        Care EveryWhere ID     This is your Care EveryWhere ID. This could be used by other organizations to access your [place] medical records  [id number]        Your Vitals Were     Pulse Temperature Height Pulse Oximetry Breastfeeding? BMI (Body Mass Index)    60 98.1  F (36.7  C) (Tympanic) 5' (1.524 m) 98% No 23.05 kg/m2       Blood Pressure from Last 3 Encounters:   18 121/58   17 126/60   17 140/79    Weight from Last 3 Encounters:   18 118 lb (53.5 kg)   17 114 lb (51.7 kg)   17 111 lb (50.3 kg)              We Performed the Following     Basic metabolic panel     CBC with platelets     Vitamin D Deficiency          Today's Medication Changes          These changes are accurate as of 18 11:41 AM.  If you have any questions, ask your nurse or doctor.               Start taking these medicines.        Dose/Directions    vitamin D 2000 units tablet   Used for:  Vitamin D deficiency   Started by:  Lavelle Aj MD        Dose:  2000 Units   Take 2,000 Units by mouth daily   Quantity:  100 tablet   Refills:  3         These medicines have changed or have updated prescriptions.        Dose/Directions    traMADol 50 MG tablet   Commonly known as:  ULTRAM   This may have changed:  See the new instructions.   Used for:  Degeneration of lumbar or lumbosacral intervertebral disc, Cervicalgia   Changed by:  Lavelle Aj MD        Dose:  50 mg   Take 1 tablet (50 mg) by mouth every 6 hours as needed for pain or severe pain   Quantity:  60 tablet   Refills:  0         Stop taking these medicines if you haven't already. Please contact your care team if you have questions.     VITAMIN D2 PO   Stopped by:  Lavelle Aj MD                Where to get your medicines      These  medications were sent to [facility] [id number]  [place], [place] - [address] & [facility]  [address] [place] [address]    Hours:  24-hours Phone:  [phone]     lisinopril 5 MG tablet    metoprolol tartrate 25 MG tablet    omeprazole 20 MG CR capsule    vitamin D 2000 units tablet         Some of these will need a paper prescription and others can be bought over the counter.  Ask your nurse if you have questions.     Bring a paper prescription for each of these medications     traMADol 50 MG tablet               Information about OPIOIDS     PRESCRIPTION OPIOIDS: WHAT YOU NEED TO KNOW   You have a prescription for an opioid (narcotic) pain medicine. Opioids can cause addiction. If you have a history of chemical dependency of any type, you are at a higher risk of becoming addicted to opioids. Only take this medicine after all other options have been tried. Take it for as short a time and as few doses as possible.     Do not:    Drive. If you drive while taking these medicines, you could be arrested for driving under the influence (DUI).    Operate heavy machinery    Do any other dangerous activities while taking these medicines.     Drink any alcohol while taking these medicines.      Take with any other medicines that contain acetaminophen. Read all labels carefully. Look for the word  acetaminophen  or  Tylenol.  Ask your pharmacist if you have questions or are unsure.    Store your pills in a secure place, locked if possible. We will not replace any lost or stolen medicine. If you don t finish your medicine, please throw away (dispose) as directed by your pharmacist. The [place] Pollution Control Agency has more information about safe disposal: https://www.pca.state.[place]/living-green/managing-unwanted-medications    All opioids tend to cause constipation. Drink plenty of water and eat foods that have a lot of fiber, such as fruits, vegetables, prune juice, apple juice and  high-fiber cereal. Take a laxative (Miralax, milk of magnesia, Colace, Senna) if you don t move your bowels at least every other day.          Primary Care Provider Office Phone # Fax #    Lavelle Aj [phone] [address] [facility] [place]  [place] [address]        Equal Access to Services     : Hadii aad ku hadasho Soomaali, waaxda luqadaha, qaybta kaalmada adeegyada, waxay idiin hayaan adeeg kharash la'aan . So [facility] [phone].    ATENCIÓN: Si habla español, tiene a strauss disposición servicios gratuitos de asistencia lingüística. Llame al [phone].    We comply with applicable federal civil rights laws and [place] laws. We do not discriminate on the basis of race, color, national origin, age, disability, sex, sexual orientation, or gender identity.            Thank you!     Thank you for choosing [facility]  for your care. Our goal is always to provide you with excellent care. Hearing back from our patients is one way we can continue to improve our services. Please take a few minutes to complete the written survey that you may receive in the mail after your visit with us. Thank you!             Your Updated Medication List - Protect others around you: Learn how to safely use, store and throw away your medicines at www.disposemymeds.org.          This list is accurate as of 5/22/18 11:41 AM.  Always use your most recent med list.                   Brand Name Dispense Instructions for use Diagnosis    ADVAIR DISKUS 250-50 MCG/DOSE diskus inhaler   Generic drug:  fluticasone-salmeterol     3 Inhaler    INHALE 1 PUFF BY MOUTH EVERY 12 HOURS, IN THE MORNING AND IN THE EVENING, ABOUT 12 HOURS APART    Mild persistent asthma without complication       IBANdronate 150 MG tablet    BONIVA    3 tablet    TAKE 1 TABLET BY MOUTH EVERY 30 DAYS 1 HR BEFORE BREAKFAST WITH 8OZ OF WATER AND SIT UPRIGHT 30 MIN    Senile osteoporosis       lisinopril 5 MG tablet     PRINIVIL/ZESTRIL    90 tablet    Take 1 tablet (5 mg) by mouth daily    Essential hypertension       metoprolol tartrate 25 MG tablet    LOPRESSOR    180 tablet    TAKE 1 TABLET (25 MG) BY MOUTH 2 TIMES DAILY    Essential hypertension       multivitamin, therapeutic with minerals Tabs tablet      Take 1 tablet by mouth daily        omeprazole 20 MG CR capsule    priLOSEC    90 capsule    TAKE 1 CAPSULE (20 MG) BY MOUTH DAILY    Gastroesophageal reflux disease without esophagitis       order for DME     1 Device    1 blood pressure cuff, automatic digital reading for home use    Benign hypertension       order for DME     1 each    Equipment being ordered: TENS    Cervicalgia       traMADol 50 MG tablet    ULTRAM    60 tablet    Take 1 tablet (50 mg) by mouth every 6 hours as needed for pain or severe pain    Degeneration of lumbar or lumbosacral intervertebral disc, Cervicalgia       VENTOLIN  (90 Base) MCG/ACT Inhaler   Generic drug:  albuterol     18 g    INHALE 2 PUFFS BY MOUTH EVERY 4-6 HOURS AS NEEDED    Mild persistent asthma without complication       vitamin D 2000 units tablet     100 tablet    Take 2,000 Units by mouth daily    Vitamin D deficiency

## 2018-05-22 NOTE — PATIENT INSTRUCTIONS
(J44.9) COPD, moderate (H)  (primary encounter diagnosis)  Comment:  respiratory therapy - [phone] to schedule a follow up pulmonary function testing and continue current inhalers of advair and albuterol  Plan: Spirometry, Breath Capacity:  Future Order, RT         Performed            (J45.20) Intermittent asthma without complication, unspecified asthma severity  Comment: as above   Plan: Spirometry, Breath Capacity:  Future Order, RT         Performed            (M81.0) Age-related osteoporosis without current pathological fracture  Comment: Recommend repeat bone density scan and we can dicsuss need for conitnued boniva depending.  [facility] (also performs diagnostic mammogram, ultrasound and biopsy) [phone].   Plan: DX Hip/Pelvis/Spine, Vitamin D Deficiency            (I10) Essential hypertension  Comment: blood pressure is well controlled   Plan: lisinopril (PRINIVIL/ZESTRIL) 5 MG tablet,         metoprolol tartrate (LOPRESSOR) 25 MG tablet,         Basic metabolic panel, CBC with platelets            (K21.9) Gastroesophageal reflux disease without esophagitis  Comment: We will refill omeprazole 20 mg daily   Plan: omeprazole (PRILOSEC) 20 MG CR capsule            (M51.37) LUMB/LUMBOSAC DISC DEGEN  Comment: OK to continue tramadol  Plan: traMADol (ULTRAM) 50 MG tablet            (M54.2) Cervicalgia  Comment: As above   Plan: traMADol (ULTRAM) 50 MG tablet            (E55.9) Vitamin D deficiency  Comment: Check vitamin D and start cholecaliferol 2,000 units daily  Plan: Cholecalciferol (VITAMIN D) 2000 units tablet

## 2018-05-23 DIAGNOSIS — D64.9 ANEMIA, UNSPECIFIED TYPE: ICD-10-CM

## 2018-05-23 LAB
ANION GAP SERPL CALCULATED.3IONS-SCNC: 4 MMOL/L (ref 3–14)
BUN SERPL-MCNC: 16 MG/DL (ref 7–30)
CALCIUM SERPL-MCNC: 9.2 MG/DL (ref 8.5–10.1)
CHLORIDE SERPL-SCNC: 106 MMOL/L (ref 94–109)
CO2 SERPL-SCNC: 30 MMOL/L (ref 20–32)
CREAT SERPL-MCNC: 0.62 MG/DL (ref 0.52–1.04)
DEPRECATED CALCIDIOL+CALCIFEROL SERPL-MC: 28 UG/L (ref 20–75)
FERRITIN SERPL-MCNC: 17 NG/ML (ref 8–252)
GFR SERPL CREATININE-BSD FRML MDRD: >90 ML/MIN/1.7M2
GLUCOSE SERPL-MCNC: 103 MG/DL (ref 70–99)
IRON SATN MFR SERPL: 21 % (ref 15–46)
IRON SERPL-MCNC: 77 UG/DL (ref 35–180)
POTASSIUM SERPL-SCNC: 4.5 MMOL/L (ref 3.4–5.3)
SODIUM SERPL-SCNC: 140 MMOL/L (ref 133–144)
TIBC SERPL-MCNC: 360 UG/DL (ref 240–430)
VIT B12 SERPL-MCNC: 937 PG/ML (ref 193–986)

## 2018-05-23 ASSESSMENT — ASTHMA QUESTIONNAIRES: ACT_TOTALSCORE: 18

## 2018-05-25 ENCOUNTER — TELEPHONE (OUTPATIENT)
Dept: FAMILY MEDICINE | Facility: CLINIC | Age: 78
End: 2018-05-25

## 2018-05-26 NOTE — TELEPHONE ENCOUNTER
Can we call Wilma Lorenzana and let her know that     I had the opportunity to review your recent labs and a summary of your labs reads as follows:    Your complete blood counts show new finding of anemia and low iron  - I would recommend a follow up in the clinic to review this  Your basic metabolic panel shows stable average blood glucose and renal function  Your vitamin D returned within normal limits

## 2018-05-29 NOTE — TELEPHONE ENCOUNTER
Spoke with daughter and relayed message from Dr. Aj. Patient scheduled 6-1-2018. Daughter requests No Interpretor for this appt as she will be with her mother.  Lo Wing MA

## 2018-06-01 ENCOUNTER — OFFICE VISIT (OUTPATIENT)
Dept: FAMILY MEDICINE | Facility: CLINIC | Age: 78
End: 2018-06-01
Payer: COMMERCIAL

## 2018-06-01 VITALS
HEART RATE: 56 BPM | OXYGEN SATURATION: 97 % | WEIGHT: 115 LBS | DIASTOLIC BLOOD PRESSURE: 68 MMHG | SYSTOLIC BLOOD PRESSURE: 140 MMHG | RESPIRATION RATE: 16 BRPM | BODY MASS INDEX: 22.58 KG/M2 | HEIGHT: 60 IN | TEMPERATURE: 97 F

## 2018-06-01 DIAGNOSIS — M54.2 CERVICALGIA: ICD-10-CM

## 2018-06-01 DIAGNOSIS — M54.9 UPPER BACK PAIN, CHRONIC: ICD-10-CM

## 2018-06-01 DIAGNOSIS — G89.4 CHRONIC PAIN SYNDROME: ICD-10-CM

## 2018-06-01 DIAGNOSIS — G89.29 UPPER BACK PAIN, CHRONIC: ICD-10-CM

## 2018-06-01 DIAGNOSIS — M54.50 CHRONIC MIDLINE LOW BACK PAIN WITHOUT SCIATICA: ICD-10-CM

## 2018-06-01 DIAGNOSIS — J44.9 COPD, MODERATE (H): ICD-10-CM

## 2018-06-01 DIAGNOSIS — G89.29 CHRONIC MIDLINE LOW BACK PAIN WITHOUT SCIATICA: ICD-10-CM

## 2018-06-01 DIAGNOSIS — J45.30 MILD PERSISTENT ASTHMA WITHOUT COMPLICATION: ICD-10-CM

## 2018-06-01 DIAGNOSIS — D50.9 IRON DEFICIENCY ANEMIA, UNSPECIFIED IRON DEFICIENCY ANEMIA TYPE: Primary | ICD-10-CM

## 2018-06-01 DIAGNOSIS — R73.01 IFG (IMPAIRED FASTING GLUCOSE): ICD-10-CM

## 2018-06-01 DIAGNOSIS — M81.0 AGE-RELATED OSTEOPOROSIS WITHOUT CURRENT PATHOLOGICAL FRACTURE: ICD-10-CM

## 2018-06-01 DIAGNOSIS — M25.512 PAIN IN JOINT OF LEFT SHOULDER: ICD-10-CM

## 2018-06-01 PROCEDURE — 99213 OFFICE O/P EST LOW 20 MIN: CPT | Performed by: INTERNAL MEDICINE

## 2018-06-01 RX ORDER — AMOXICILLIN 250 MG
1 CAPSULE ORAL 2 TIMES DAILY
Qty: 60 TABLET | Refills: 11 | Status: SHIPPED | OUTPATIENT
Start: 2018-06-01 | End: 2020-05-05

## 2018-06-01 RX ORDER — FERROUS SULFATE 325(65) MG
325 TABLET ORAL
Qty: 90 TABLET | Refills: 3 | Status: SHIPPED | OUTPATIENT
Start: 2018-06-01

## 2018-06-01 RX ORDER — ALBUTEROL SULFATE 90 UG/1
2 AEROSOL, METERED RESPIRATORY (INHALATION) EVERY 4 HOURS PRN
Qty: 3 INHALER | Refills: 1 | Status: SHIPPED | OUTPATIENT
Start: 2018-06-01 | End: 2019-01-25

## 2018-06-01 NOTE — PATIENT INSTRUCTIONS
(D50.9) Iron deficiency anemia, unspecified iron deficiency anemia type  (primary encounter diagnosis)  Comment: We discussed that most likely her iron deficiency anemia is due to lack of iron in her diet and we will try to supplement this with oral iron supplement daily in addition to taking a stool softener.  We discussed risks/benefits of pursuing workup with colonoscopy, upper endoscopy and this was declined.  Stool occult blood test was requested.  Follow up in about 6 weeks for recheck   Plan: Fecal colorectal cancer screen (FIT)

## 2018-06-01 NOTE — MR AVS SNAPSHOT
After Visit Summary   6/1/2018    Wilma Lorenzana    MRN: [id number]           Patient Information     Date Of Birth          1940        Visit Information        Provider Department      6/1/2018 11:00 AM Lavelle Aj MD [facility]        Today's Diagnoses     Iron deficiency anemia, unspecified iron deficiency anemia type    -  1    Age-related osteoporosis without current pathological fracture        IFG (impaired fasting glucose)        COPD, moderate (H)          Care Instructions    (D50.9) Iron deficiency anemia, unspecified iron deficiency anemia type  (primary encounter diagnosis)  Comment: We discussed that most likely her iron deficiency anemia is due to lack of iron in her diet and we will try to supplement this with oral iron supplement daily in addition to taking a stool softener.  We discussed risks/benefits of pursuing workup with colonoscopy, upper endoscopy and this was declined.  Stool occult blood test was requested.  Follow up in about 6 weeks for recheck   Plan: Fecal colorectal cancer screen (FIT)                     Follow-ups after your visit        Future tests that were ordered for you today     Open Future Orders        Priority Expected Expires Ordered    Fecal colorectal cancer screen (FIT) Routine 6/22/2018 8/24/2018 6/1/2018            Who to contact     If you have questions or need follow up information about today's clinic visit or your schedule please contact [facility] directly at [phone].  Normal or non-critical lab and imaging results will be communicated to you by MyChart, letter or phone within 4 business days after the clinic has received the results. If you do not hear from us within 7 days, please contact the clinic through MyChart or phone. If you have a critical or abnormal lab result, we will notify you by phone as soon as possible.  Submit refill requests through [facility] or call your pharmacy and they will  "forward the refill request to us. Please allow 3 business days for your refill to be completed.          Additional Information About Your Visit        TCAS OnlineharMoPix Information     [employer] lets you send messages to your doctor, view your test results, renew your prescriptions, schedule appointments and more. To sign up, go to [email or web address] . Click on \"Log in\" on the left side of the screen, which will take you to the Welcome page. Then click on \"Sign up Now\" on the right side of the page.     You will be asked to enter the access code listed below, as well as some personal information. Please follow the directions to create your username and password.     Your access code is: [email or web address]  Expires: 2018 11:41 AM     Your access code will  in 90 days. If you need help or a new code, please call your [place] clinic or [phone].        Care EveryWhere ID     This is your Care EveryWhere ID. This could be used by other organizations to access your [place] medical records  [id number]        Your Vitals Were     Pulse Temperature Respirations Height Pulse Oximetry BMI (Body Mass Index)    56 97  F (36.1  C) (Oral) 16 5' (1.524 m) 97% 22.46 kg/m2       Blood Pressure from Last 3 Encounters:   18 140/68   18 121/58   17 126/60    Weight from Last 3 Encounters:   18 115 lb (52.2 kg)   18 118 lb (53.5 kg)   17 114 lb (51.7 kg)                 Today's Medication Changes          These changes are accurate as of 18 11:35 AM.  If you have any questions, ask your nurse or doctor.               Start taking these medicines.        Dose/Directions    ferrous sulfate 325 (65 Fe) MG tablet   Commonly known as:  IRON   Used for:  Iron deficiency anemia, unspecified iron deficiency anemia type, Age-related osteoporosis without current pathological fracture, IFG (impaired fasting glucose), COPD, moderate (H)   Started by:  Lavelle Aj MD        Dose:  325 mg "   Take 1 tablet (325 mg) by mouth daily (with breakfast)   Quantity:  90 tablet   Refills:  3       order for DME   Used for:  Iron deficiency anemia, unspecified iron deficiency anemia type, Age-related osteoporosis without current pathological fracture, IFG (impaired fasting glucose), COPD, moderate (H)   Started by:  Lavelle Aj MD        Equipment being ordered:   Ensure nutritional supplement - 1/day   Quantity:  30 each   Refills:  11       senna-docusate 8.6-50 MG per tablet   Commonly known as:  SENOKOT-S;PERICOLACE   Used for:  Iron deficiency anemia, unspecified iron deficiency anemia type, Age-related osteoporosis without current pathological fracture, IFG (impaired fasting glucose), COPD, moderate (H)   Started by:  Lavelle Aj MD        Dose:  1 tablet   Take 1 tablet by mouth 2 times daily   Quantity:  60 tablet   Refills:  11            Where to get your medicines      These medications were sent to [facility] [address] [place], [place] - [address] AT [address] [place] [address] & [facility]  [address] [place] [id number]    Hours:  24-hours Phone:  [phone]     ferrous sulfate 325 (65 Fe) MG tablet    senna-docusate 8.6-50 MG per tablet         Some of these will need a paper prescription and others can be bought over the counter.  Ask your nurse if you have questions.     Bring a paper prescription for each of these medications     order for DME                Primary Care Provider Office Phone # Fax #    Lavelle Aj [phone] [address] [facility] [employer] [address]        Equal Access to Services     [place] ENNIS AH: Hadii edward sahu hadasho Soomaali, waaxda luqadaha, qaybta kaalmada adeegyaulises, yamil martinez. So [facility] [phone].    ATENCIÓN: Si habla español, tiene a strauss disposición servicios gratuitos de asistencia lingüística. Llame al [phone].    We comply with applicable federal civil rights laws and  [place] laws. We do not discriminate on the basis of race, color, national origin, age, disability, sex, sexual orientation, or gender identity.            Thank you!     Thank you for choosing [facility]  for your care. Our goal is always to provide you with excellent care. Hearing back from our patients is one way we can continue to improve our services. Please take a few minutes to complete the written survey that you may receive in the mail after your visit with us. Thank you!             Your Updated Medication List - Protect others around you: Learn how to safely use, store and throw away your medicines at www.disposemymeds.org.          This list is accurate as of 6/1/18 11:35 AM.  Always use your most recent med list.                   Brand Name Dispense Instructions for use Diagnosis    ADVAIR DISKUS 250-50 MCG/DOSE diskus inhaler   Generic drug:  fluticasone-salmeterol     3 Inhaler    INHALE 1 PUFF BY MOUTH EVERY 12 HOURS, IN THE MORNING AND IN THE EVENING, ABOUT 12 HOURS APART    Mild persistent asthma without complication       ferrous sulfate 325 (65 Fe) MG tablet    IRON    90 tablet    Take 1 tablet (325 mg) by mouth daily (with breakfast)    Iron deficiency anemia, unspecified iron deficiency anemia type, Age-related osteoporosis without current pathological fracture, IFG (impaired fasting glucose), COPD, moderate (H)       IBANdronate 150 MG tablet    BONIVA    3 tablet    TAKE 1 TABLET BY MOUTH EVERY 30 DAYS 1 HR BEFORE BREAKFAST WITH 8OZ OF WATER AND SIT UPRIGHT 30 MIN    Senile osteoporosis       lisinopril 5 MG tablet    PRINIVIL/ZESTRIL    90 tablet    Take 1 tablet (5 mg) by mouth daily    Essential hypertension       metoprolol tartrate 25 MG tablet    LOPRESSOR    180 tablet    TAKE 1 TABLET (25 MG) BY MOUTH 2 TIMES DAILY    Essential hypertension       multivitamin, therapeutic with minerals Tabs tablet      Take 1 tablet by mouth daily        omeprazole 20 MG CR capsule     priLOSEC    90 capsule    TAKE 1 CAPSULE (20 MG) BY MOUTH DAILY    Gastroesophageal reflux disease without esophagitis       order for DME     1 Device    1 blood pressure cuff, automatic digital reading for home use    Benign hypertension       order for DME     1 each    Equipment being ordered: TENS    Cervicalgia       order for DME     30 each    Equipment being ordered:   Ensure nutritional supplement - 1/day    Iron deficiency anemia, unspecified iron deficiency anemia type, Age-related osteoporosis without current pathological fracture, IFG (impaired fasting glucose), COPD, moderate (H)       senna-docusate 8.6-50 MG per tablet    SENOKOT-S;PERICOLACE    60 tablet    Take 1 tablet by mouth 2 times daily    Iron deficiency anemia, unspecified iron deficiency anemia type, Age-related osteoporosis without current pathological fracture, IFG (impaired fasting glucose), COPD, moderate (H)       traMADol 50 MG tablet    ULTRAM    60 tablet    Take 1 tablet (50 mg) by mouth every 6 hours as needed for pain or severe pain    Degeneration of lumbar or lumbosacral intervertebral disc, Cervicalgia       VENTOLIN  (90 Base) MCG/ACT Inhaler   Generic drug:  albuterol     18 g    INHALE 2 PUFFS BY MOUTH EVERY 4-6 HOURS AS NEEDED    Mild persistent asthma without complication       vitamin D 2000 units tablet     100 tablet    Take 2,000 Units by mouth daily    Vitamin D deficiency

## 2018-06-01 NOTE — PROGRESS NOTES
SUBJECTIVE:   Wilma Lorenzana is a 77 year old female who presents to clinic today for the following health issues:      Follow up on [facility]  CLINIC PROGRESS NOTE    Subjective:  Anemia   Wilma Lorenzana presents to the clinic today accompanied by her daughter who is acting as interpretor.  She was recently noticed to have a new finding of low hemoglobin and lower iron levels.  She has not had any bleeding in the stool that she is aware of.  Her diet is such that she does fast routinely and this may be contributing a chronic iron deficiency.  She feels well with no concerns for fatigue at this time.  She has not been ill with any infections at this time.        Past medical history, medications, allergies, social history, family history reviewed and updated in [facility] as of 6/1/2018 .    ROS  CONSTITUTIONAL: no fatigue, no unexpected change in weight  SKIN: no worrisome rashes, no worrisome moles, no worrisome lesions  EYES: no acute vision problems or changes  ENT: no ear problems, no mouth problems, no throat problems  RESP: no significant cough, no shortness of breath  CV: no chest pain, no palpitations, no new or worsening peripheral edema  GI: no nausea, no vomiting, no constipation, no diarrhea  : no frequency, no dysuria, no hematuria  MS: continues to have neck, upper back, shoulder and arm pain   PSYCHIATRIC: no changes in mood or affect      Objective:  Vitals  /68  Pulse 56  Temp 97  F (36.1  C) (Oral)  Resp 16  Ht 5' (1.524 m)  Wt 115 lb (52.2 kg)  SpO2 97%  BMI 22.46 kg/m2  GEN: Alert Oriented x3 NAD  PSYCH: Mood good, affect mood congruent  Exam deferred.    No results found for this or any previous visit (from the past 24 hour(s)).    Assessment/Plan:  Patient Instructions   (D50.9) Iron deficiency anemia, unspecified iron deficiency anemia type  (primary encounter diagnosis)  Comment: We discussed that most likely her iron deficiency anemia is due to  lack of iron in her diet and we will try to supplement this with oral iron supplement daily in addition to taking a stool softener.  We discussed risks/benefits of pursuing workup with colonoscopy, upper endoscopy and this was declined.  Stool occult blood test was requested.  Follow up in about 6 weeks for recheck   Plan: Fecal colorectal cancer screen (FIT)                 Follow up in 6 weeks    15 minutes spent with patient.  Over 50% of time counseling     Disclaimer: This note consists of symbols derived from keyboarding, dictation and/or voice recognition software. As a result, there may be errors in the script that have gone undetected. Please consider this when interpreting information found in this chart.    Lavelle Aj MD  [phone]

## 2018-06-09 DIAGNOSIS — I10 ESSENTIAL HYPERTENSION: ICD-10-CM

## 2018-06-09 NOTE — TELEPHONE ENCOUNTER
"Requested Prescriptions   Pending Prescriptions Disp Refills     lisinopril (PRINIVIL/ZESTRIL) 5 MG tablet [Pharmacy Med Name: LISINOPRIL 5 MG TABLET] 90 tablet 3     Sig: TAKE 1 TABLET (5 MG) BY MOUTH DAILY    ACE Inhibitors (Including Combos) Protocol Failed    6/9/2018  1:22 AM       Failed - Blood pressure under 140/90 in past 12 months    BP Readings from Last 3 Encounters:   06/01/18 140/68   05/22/18 121/58   11/17/17 126/60                Passed - Recent (12 mo) or future (30 days) visit within the authorizing provider's specialty    Patient had office visit in the last 12 months or has a visit in the next 30 days with authorizing provider or within the authorizing provider's specialty.  See \"Patient Info\" tab in inbasket, or \"Choose Columns\" in Meds & Orders section of the refill encounter.           Passed - Patient is age 18 or older       Passed - No active pregnancy on record       Passed - Normal serum creatinine on file in past 12 months    Recent Labs   Lab Test  05/22/18   1154   CR  0.62            Passed - Normal serum potassium on file in past 12 months    Recent Labs   Lab Test  05/22/18   1154   POTASSIUM  4.5            Passed - No positive pregnancy test in past 12 months     Last Written Prescription Date:  5/22/18  Last Fill Quantity: 90 tablet,  # refills: 3   Last office visit: 6/1/2018 with prescribing provider:  Madai   Future Office Visit:         metoprolol tartrate (LOPRESSOR) 25 MG tablet [Pharmacy Med Name: METOPROLOL TARTRATE 25 MG TAB] 180 tablet 3     Sig: TAKE 1 TABLET (25 MG) BY MOUTH 2 TIMES DAILY    Beta-Blockers Protocol Failed    6/9/2018  1:22 AM       Failed - Blood pressure under 140/90 in past 12 months    BP Readings from Last 3 Encounters:   06/01/18 140/68   05/22/18 121/58   11/17/17 126/60                Passed - Patient is age 6 or older       Passed - Recent (12 mo) or future (30 days) visit within the authorizing provider's specialty    Patient had office " "visit in the last 12 months or has a visit in the next 30 days with authorizing provider or within the authorizing provider's specialty.  See \"Patient Info\" tab in inbasket, or \"Choose Columns\" in Meds & Orders section of the refill encounter.         Last Written Prescription Date:  5/22/18  Last Fill Quantity: 180 tablet,  # refills: 3   Last office visit: 6/1/2018 with prescribing provider:  Madai   Future Office Visit:           "

## 2018-06-12 RX ORDER — METOPROLOL TARTRATE 25 MG/1
TABLET, FILM COATED ORAL
Start: 2018-06-12

## 2018-06-12 RX ORDER — LISINOPRIL 5 MG/1
TABLET ORAL
Start: 2018-06-12

## 2018-08-06 ENCOUNTER — PATIENT OUTREACH (OUTPATIENT)
Dept: GERIATRIC MEDICINE | Facility: CLINIC | Age: 78
End: 2018-08-06

## 2018-08-06 NOTE — PROGRESS NOTES
[facility] Care Coordination Contact  Annual Assessment Coordination    Call to daughter Sergio to schedule annual assessment. She did not answer, and VM box was full so no message could be left.    Jodi Ingram RN  [facility]  [phone]  Fax: [phone]

## 2018-08-07 NOTE — PROGRESS NOTES
[facility] Care Coordination Contact  Annual Assessment Coordination    Call to daughter to schedule annual assessment. No answer and VM box is full.     Jodi Ingram RN  [facility]  [phone]  Fax: [phone]

## 2018-08-08 ENCOUNTER — PATIENT OUTREACH (OUTPATIENT)
Dept: GERIATRIC MEDICINE | Facility: CLINIC | Age: 78
End: 2018-08-08

## 2018-08-08 NOTE — PROGRESS NOTES
[facility] Care Coordination Contact  Telephone Follow Up    Call from daughter who shares that during the last office visit Ensure and a TENS unit were discussed with with the PCP and she would like to know how to get these items. We reviewed that in the past we tried to order a TENS unit and the dx used did not qualify for coverage. Daughter stated there were many other pain diagnosis PCP would use this time. CM also stated that for Ensure to be covered by insurance it needs to be medically necessary. CM reviewed visit notes and can't find mention of Ensure, so unsure if there is a qualifying diagnosis. CM stated that we can attempt to have these items covered. Email sent to [facility] at [facility].     CM also mentioned to daughter that her VM is full, and writer has been unable to leave messages in the past, which makes it hard to be in touch with her. Daughter will look into this.    Jodi Ingram RN  [facility]  [phone]  Fax: [phone]

## 2018-08-08 NOTE — PROGRESS NOTES
[facility] Care Coordination Contact    [facility] Refusal     Member refused home visit HRA on 8/8/18. Daughter states that her mom is very involved with Christianity this month, and will not be able to schedule an appointment in August. Daughter stated that she will call writer back when they are ready to schedule an appointment. CM shared that a refusal letter will be sent since visit can't be scheduled within 365 days of last assessment.     No formal services in place. Informal support from daughter.     Requested CMS to mail refusal letter and enter MMIS.     Jodi Ingram RN  [facility]  [phone]  Fax: [phone]

## 2018-08-08 NOTE — PROGRESS NOTES
[facility] Care Coordination Contact  Annual Assessment Coordination    Third call to daughter to schedule annual assessment. No answer, and VM box is full, so no message can be left.     Will request that CMS mail the [facility] letter.     Jodi Ingram RN  [facility]  [phone]  Fax: [phone]

## 2018-08-09 ENCOUNTER — PATIENT OUTREACH (OUTPATIENT)
Dept: GERIATRIC MEDICINE | Facility: CLINIC | Age: 78
End: 2018-08-09

## 2018-08-09 NOTE — PROGRESS NOTES
"[facility] Care Coordination Contact  Per CC, mailed client a \"Refusal of Home Visit\" letter.  MMIS entry.    Mailed member [employer] Self Report Health Assessment with self addressed return envelope.       Vidya Brooks  Case Management Specialist  [facility]   [phone]    "

## 2018-08-09 NOTE — LETTER
August 9, 2018    Important Plan Information    WILMA JONATAN [facility]  [address]  [place] [address]  I'm Here to Help  Dear Wilma,  My name is Jodi Ingram RN, and I am your [facility] Care Coordinator. You indicated you are not interested in meeting with me to complete a health risk assessment.   As a Care Coordinator, I am here to help. My role is to make sure that your health plan is working for you. I am available to:     Review your health care needs with you over the phone or in-person     Provide support for and information about covered services or supplies to help keep you safe and healthy in your home    Answer questions about your insurance     Help you find a provider, such as a doctor or dentist, to meet your unique needs    Enclosed is a Self-Risk Assessment form that we ask you to fill out so we can get to know you a little bit better. Once completed, please send back in the self-addressed stamped envelope.     Questions?  Call me at [phone] Monday-Friday between 8am and 5pm. TTY/TDD: 711. If you d like, a friend or family member may call for you.   You may also call [facility] Customer Service at [phone] or [phone] (toll free) from 8 a.m. - 8 p.m. Central, seven days a week. Access to representatives may be limited at times. TTY/TDD: 711.  Sincerely,      Jodi Ingram RN  [facility]  [phone]     cc: member records        American Indians can continue to use [place] and [facility] ([facility]) clinics. We will not require prior approval or impose any conditions for you to get services at these clinics. For elders age 65 years and older this includes Elderly Waiver (EW) services accessed through the [place]. If a doctor or other provider in a [place] or [facility] clinic refers you to a provider in our network, we will not require you to see your primary care provider prior to the referral.    For accessible formats of this publication or assistance with equal or access to  our services, visit [email or web address], or call [phone] (toll free) or use your preferred relay service.    Auxiliary Aids and Services.   [employer] provides auxiliary aids and services, like qualified interpreters or information in accessible formats, free of charge and in a timely manner, to ensure an equal opportunity to participate in our health care programs. Contact [employer] Customer Service at [email or web address] or call [phone] (toll free) or use your preferred relay service.    Language Assistance Services.   [employer] provides translated documents and spoken language interpreting, free of charge and in a timely manner, when language assistance services are necessary to ensure limited English speakers have meaningful access to our information and services. Contact [employer] Service at [employer]/contactmedicaid or call [phone] (toll free) or use your preferred relay service.     Civil Rights Notice  Discrimination is against the law. [facility] does not discriminate on the basis of any of the following:    Race    Color    National Origin    Creed    Pentecostal    Age    Public Assistance Status    Receipt of Health Care Services    Disability (including physical or mental impairment)    Sex (including sex stereotypes and gender identity)    Marital Status    Political Beliefs    Medical Condition    Genetic Information    Sexual Orientation    Claims Experience    Medical History    Health Status    Civil Rights Complaints.   You have the right to file a discrimination complaint if you believe you were treated in a discriminatory way by [employer]. You may contact any of the following four agencies directly to file a discrimination complaint.    U.S. Department of Health and Human Services  Office for Civil Rights (OCR)  You have the right to file a complaint with the OCR, a federal agency, if you believe you have been discriminated against because of any of the  following:    Race    Disability    Color    Sex (including sex stereotypes and gender identity)    National Origin    Age    Contact the OCR directly to file a complaint:         Director         U.S. Department of Health and Human Services  Office for Civil Rights         [address] [place] [address] [address] [place] [address]         [phone] (Voice)         [phone] (TDD)         Complaint Portal - [email or web address]     [place] Department of Human Rights (MDHR)  In [place], you have the right to file a complaint with the MDHR if you believe you have been discriminated against because of any of the following:      Race    Color    National Origin    Sabianism    Creed    Sex    Sexual Orientation    Marital Status    Public Assistance Status    Contact the MD directly to file a complaint:         [place] Department of Human Rights         [address] [address]         [phone] (voice)          [phone] (toll free)         711 or [phone] ([place] Relay)         [phone] (Fax)         [email or web address] (Email)     [place] Department of Human Services (DHS)  You have the right to file a complaint with [facility] if you believe you have been discriminated against in our health care programs because of any of the following:    Race    Color    National Origin    Creed    Sabianism    Age    Public Assistance Status    Receipt of Health Care Services    Disability (including physical or mental impairment)    Sex (including sex stereotypes and gender identity)    Marital Status    Political Beliefs    Medical Condition    Genetic Information    Sexual Orientation    Claims Experience    Medical History    Health Status    Complaints must be in writing and filed within 180 days of the date you discovered the alleged discrimination. The complaint must contain your name and address and  describe the discrimination you are complaining about. After we get your complaint, we will review it and notify you in writing about whether we have authority to investigate. If we do, we will investigate the complaint.      [facility] will notify you in writing of the investigation s outcome. You have a right to appeal the outcome if you disagree with the decision. To appeal, you must send a written request to have [facility] review the investigation outcome period. Be brief and state why you disagree with the decision. Include additional information you think is important.      If you file a complaint in this way, the people who work for the agency named in the complaint cannot retaliate against you. This means they cannot punish you in any way for filing a complaint. Filing a complaint in this way does not stop you from seeking out other legal or administration actions.     Contact [facility] directly to file a discrimination complaint:        ATTN: Civil Rights Coordinator        [facility]        Equal Opportunity and Access Division        [address] [address]        [place] [address] [phone] (voice) or use your preferred relay service     [facility] Complaint Notice   Contact [employer] directly to file a discrimination complaint:  [facility] Civil Rights Coordinator  Mail Route   [address]  [place] [address] [phone] (voice) or use your preferred relay service  [email or web address]

## 2018-08-27 ENCOUNTER — TELEPHONE (OUTPATIENT)
Dept: FAMILY MEDICINE | Facility: CLINIC | Age: 78
End: 2018-08-27

## 2018-08-27 DIAGNOSIS — Z12.11 SPECIAL SCREENING FOR MALIGNANT NEOPLASMS, COLON: Primary | ICD-10-CM

## 2018-08-27 NOTE — TELEPHONE ENCOUNTER
I do not have forms.  Can we call patient's daughter to make sure that this is medical equipment that patient has requested or needs?    Lavelle Aj MD

## 2018-08-27 NOTE — TELEPHONE ENCOUNTER
Reason for Call:  Other paperwork    Detailed comments:  Jenny from [facility].  forms for tens unit sent on 8/13 and 8/20 to [phone].  Have they been received and if so what is the status.     Phone Number : Other phone number:  [phone] # for Jenny    Best Time: any    Can we leave a detailed message on this number?  Yes, confidential voice mail for Jenny    Call taken on 8/27/2018 at 10:08 AM by Lesli Rendon

## 2018-08-28 NOTE — TELEPHONE ENCOUNTER
Her daughter called and requested the TENS unit, and I then placed the order with [facility]. Daughter had reported that a TENS unit had been discussed at one of her last office visits, and she had a script, but didn't know where to go to get it.     Please let me know if there are further questions or concerns.     Jodi Ingram RN  Care Coordinator  [facility]  [phone]  Fax: [phone]

## 2018-09-07 ENCOUNTER — TELEPHONE (OUTPATIENT)
Dept: FAMILY MEDICINE | Facility: CLINIC | Age: 78
End: 2018-09-07

## 2018-09-07 DIAGNOSIS — M81.0 AGE-RELATED OSTEOPOROSIS WITHOUT CURRENT PATHOLOGICAL FRACTURE: ICD-10-CM

## 2018-09-07 DIAGNOSIS — J44.9 COPD, MODERATE (H): ICD-10-CM

## 2018-09-07 DIAGNOSIS — D50.9 IRON DEFICIENCY ANEMIA, UNSPECIFIED IRON DEFICIENCY ANEMIA TYPE: ICD-10-CM

## 2018-09-07 DIAGNOSIS — R73.01 IFG (IMPAIRED FASTING GLUCOSE): ICD-10-CM

## 2018-09-07 NOTE — TELEPHONE ENCOUNTER
Reason for Call: Request for an order /prescription    Order or referral being requested: Ensure-vanilla  Date needed: as soon as possible    Has the patient been seen by the PCP for this problem? YES    Additional comments: [facility] has been sending requests for this prescription since the beginning of August with no response.  (to both fax numbers)    Please fax order or prescription to:  [phone]    Alfreda @ [facility] can be reached at:  [phone]      Call taken on 9/7/2018 at 12:54 PM by Cira Jeffries.

## 2018-09-14 ENCOUNTER — OFFICE VISIT (OUTPATIENT)
Dept: FAMILY MEDICINE | Facility: CLINIC | Age: 78
End: 2018-09-14
Payer: COMMERCIAL

## 2018-09-14 ENCOUNTER — RADIANT APPOINTMENT (OUTPATIENT)
Dept: GENERAL RADIOLOGY | Facility: CLINIC | Age: 78
End: 2018-09-14
Attending: INTERNAL MEDICINE
Payer: COMMERCIAL

## 2018-09-14 VITALS
OXYGEN SATURATION: 96 % | TEMPERATURE: 99.4 F | DIASTOLIC BLOOD PRESSURE: 66 MMHG | SYSTOLIC BLOOD PRESSURE: 119 MMHG | WEIGHT: 113 LBS | HEART RATE: 93 BPM | BODY MASS INDEX: 22.19 KG/M2 | HEIGHT: 60 IN

## 2018-09-14 DIAGNOSIS — J45.31 MILD PERSISTENT ASTHMA WITH EXACERBATION: Primary | ICD-10-CM

## 2018-09-14 DIAGNOSIS — J06.9 UPPER RESPIRATORY TRACT INFECTION, UNSPECIFIED TYPE: ICD-10-CM

## 2018-09-14 DIAGNOSIS — J45.31 MILD PERSISTENT ASTHMA WITH EXACERBATION: ICD-10-CM

## 2018-09-14 DIAGNOSIS — R07.0 THROAT PAIN: ICD-10-CM

## 2018-09-14 LAB
DEPRECATED S PYO AG THROAT QL EIA: NORMAL
SPECIMEN SOURCE: NORMAL

## 2018-09-14 PROCEDURE — 99213 OFFICE O/P EST LOW 20 MIN: CPT | Performed by: INTERNAL MEDICINE

## 2018-09-14 PROCEDURE — 71046 X-RAY EXAM CHEST 2 VIEWS: CPT

## 2018-09-14 PROCEDURE — 87081 CULTURE SCREEN ONLY: CPT | Performed by: INTERNAL MEDICINE

## 2018-09-14 PROCEDURE — 87880 STREP A ASSAY W/OPTIC: CPT | Performed by: INTERNAL MEDICINE

## 2018-09-14 RX ORDER — PREDNISONE 20 MG/1
40 TABLET ORAL DAILY
Qty: 10 TABLET | Refills: 0 | Status: SHIPPED | OUTPATIENT
Start: 2018-09-14 | End: 2018-09-19

## 2018-09-14 RX ORDER — AZITHROMYCIN 250 MG/1
TABLET, FILM COATED ORAL
Qty: 6 TABLET | Refills: 0 | Status: SHIPPED | OUTPATIENT
Start: 2018-09-14 | End: 2019-01-28

## 2018-09-14 NOTE — MR AVS SNAPSHOT
After Visit Summary   9/14/2018    Wilma Lorenzana    MRN: [id number]           Patient Information     Date Of Birth          1940        Visit Information        Provider Department      9/14/2018 2:00 PM Lavelle Aj MD [facility]        Today's Diagnoses     Mild persistent asthma with exacerbation    -  1    Throat pain        Upper respiratory tract infection, unspecified type          Care Instructions    (J45.31) Mild persistent asthma with exacerbation  (primary encounter diagnosis)  Comment: We will treat empirically with a course of prednisone 40 mg x 5 days in addition to continued use of Advair and albuterol as needed.  Monitor and follow up next week if not improving  Plan: predniSONE (DELTASONE) 20 MG tablet            (R07.0) Throat pain  Comment:   Plan: Strep, Rapid Screen            (J06.9) Upper respiratory tract infection, unspecified type  Comment: We will treat with course of antibiotics  - azithromycin  Plan: azithromycin (ZITHROMAX) 250 MG tablet                     Follow-ups after your visit        Future tests that were ordered for you today     Open Future Orders        Priority Expected Expires Ordered    XR Chest 2 Views Routine 9/14/2018 9/14/2019 9/14/2018            Who to contact     If you have questions or need follow up information about today's clinic visit or your schedule please contact [facility] directly at [phone].  Normal or non-critical lab and imaging results will be communicated to you by MyChart, letter or phone within 4 business days after the clinic has received the results. If you do not hear from us within 7 days, please contact the clinic through MyChart or phone. If you have a critical or abnormal lab result, we will notify you by phone as soon as possible.  Submit refill requests through [employer] or call your pharmacy and they will forward the refill request to us. Please allow 3 business days for  "your refill to be completed.          Additional Information About Your Visit        [place] [employer]ful Information     [facility] lets you send messages to your doctor, view your test results, renew your prescriptions, schedule appointments and more. To sign up, go to [email or web address] . Click on \"Log in\" on the left side of the screen, which will take you to the Welcome page. Then click on \"Sign up Now\" on the right side of the page.     You will be asked to enter the access code listed below, as well as some personal information. Please follow the directions to create your username and password.     Your access code is: [email or web address]  Expires: 2018  2:29 PM     Your access code will  in 90 days. If you need help or a new code, please call your [place] clinic or [phone].        Care EveryWhere ID     This is your Care EveryWhere ID. This could be used by other organizations to access your [place] medical records  [id number]        Your Vitals Were     Pulse Temperature Height Pulse Oximetry Breastfeeding? BMI (Body Mass Index)    93 99.4  F (37.4  C) (Oral) 5' (1.524 m) 96% No 22.07 kg/m2       Blood Pressure from Last 3 Encounters:   18 119/66   18 140/68   18 121/58    Weight from Last 3 Encounters:   18 113 lb (51.3 kg)   18 115 lb (52.2 kg)   18 118 lb (53.5 kg)              We Performed the Following     Strep, Rapid Screen          Today's Medication Changes          These changes are accurate as of 18  2:29 PM.  If you have any questions, ask your nurse or doctor.               Start taking these medicines.        Dose/Directions    azithromycin 250 MG tablet   Commonly known as:  ZITHROMAX   Used for:  Upper respiratory tract infection, unspecified type   Started by:  Lavelle Aj MD        Two tablets first day, then one tablet daily for four days.   Quantity:  6 tablet   Refills:  0       predniSONE 20 MG tablet   Commonly known as:  " DELTASONE   Used for:  Mild persistent asthma with exacerbation   Started by:  Lavelle Aj MD        Dose:  40 mg   Take 2 tablets (40 mg) by mouth daily for 5 days   Quantity:  10 tablet   Refills:  0            Where to get your medicines      [facility]  [place] - [address] [facility]  [address] [place] [address]    Hours:  24-hours Phone:  [phone]     azithromycin 250 MG tablet    predniSONE 20 MG tablet                Primary Care Provider Office Phone # Fax #    Lavelle Aj [phone] [address]   [place] [address]        Equal Access to Services     TIMOTHY [facility] HospitalCHRISTOPHER : Hadii edward sahu hadasho Soomaali, waaxda luqadaha, qaybta kaalmada adeegyada, yamil boudreaux . So [facility] [phone].    ATENCIÓN: Si habla español, tiene a strauss disposición servicios gratuitos de asistencia lingüística. [facility] [phone].    We comply with applicable federal civil rights laws and [place] laws. We do not discriminate on the basis of race, color, national origin, age, disability, sex, sexual orientation, or gender identity.            Thank you!     Thank you for choosing [facility]  for your care. Our goal is always to provide you with excellent care. Hearing back from our patients is one way we can continue to improve our services. Please take a few minutes to complete the written survey that you may receive in the mail after your visit with us. Thank you!             Your Updated Medication List - Protect others around you: Learn how to safely use, store and throw away your medicines at www.disposemymeds.org.          This list is accurate as of 9/14/18  2:29 PM.  Always use your most recent med list.                   Brand Name Dispense Instructions for use Diagnosis    albuterol 108 (90 Base) MCG/ACT inhaler    VENTOLIN HFA    3 Inhaler     Inhale 2 puffs into the lungs every 4 hours as needed for shortness of breath / dyspnea or wheezing    Mild persistent asthma without complication       azithromycin 250 MG tablet    ZITHROMAX    6 tablet    Two tablets first day, then one tablet daily for four days.    Upper respiratory tract infection, unspecified type       ferrous sulfate 325 (65 Fe) MG tablet    IRON    90 tablet    Take 1 tablet (325 mg) by mouth daily (with breakfast)    Iron deficiency anemia, unspecified iron deficiency anemia type, Age-related osteoporosis without current pathological fracture, IFG (impaired fasting glucose), COPD, moderate (H)       fluticasone-salmeterol 250-50 MCG/DOSE diskus inhaler    ADVAIR DISKUS    3 Inhaler    INHALE 1 PUFF BY MOUTH EVERY 12 HOURS, IN THE MORNING AND IN THE EVENING, ABOUT 12 HOURS APART    Mild persistent asthma without complication       IBANdronate 150 MG tablet    BONIVA    3 tablet    TAKE 1 TABLET BY MOUTH EVERY 30 DAYS 1 HR BEFORE BREAKFAST WITH 8OZ OF WATER AND SIT UPRIGHT 30 MIN    Senile osteoporosis       lisinopril 5 MG tablet    PRINIVIL/ZESTRIL    90 tablet    Take 1 tablet (5 mg) by mouth daily    Essential hypertension       metoprolol tartrate 25 MG tablet    LOPRESSOR    180 tablet    TAKE 1 TABLET (25 MG) BY MOUTH 2 TIMES DAILY    Essential hypertension       multivitamin, therapeutic with minerals Tabs tablet      Take 1 tablet by mouth daily        omeprazole 20 MG CR capsule    priLOSEC    90 capsule    TAKE 1 CAPSULE (20 MG) BY MOUTH DAILY    Gastroesophageal reflux disease without esophagitis       order for DME     1 Device    1 blood pressure cuff, automatic digital reading for home use    Benign hypertension       order for DME     1 each    Equipment being ordered: TENS    Cervicalgia, Pain in joint of left shoulder, Chronic pain syndrome, Chronic midline low back pain without sciatica, Upper back pain, chronic       order for DME     30 each    Equipment being ordered:    Ensure nutritional supplement - 1/day    Iron deficiency anemia, unspecified iron deficiency anemia type, Age-related osteoporosis without current pathological fracture, IFG (impaired fasting glucose), COPD, moderate (H)       predniSONE 20 MG tablet    DELTASONE    10 tablet    Take 2 tablets (40 mg) by mouth daily for 5 days    Mild persistent asthma with exacerbation       senna-docusate 8.6-50 MG per tablet    SENOKOT-S;PERICOLACE    60 tablet    Take 1 tablet by mouth 2 times daily    Iron deficiency anemia, unspecified iron deficiency anemia type, Age-related osteoporosis without current pathological fracture, IFG (impaired fasting glucose), COPD, moderate (H)       traMADol 50 MG tablet    ULTRAM    60 tablet    Take 1 tablet (50 mg) by mouth every 6 hours as needed for pain or severe pain    Degeneration of lumbar or lumbosacral intervertebral disc, Cervicalgia       vitamin D 2000 units tablet     100 tablet    Take 2,000 Units by mouth daily    Vitamin D deficiency

## 2018-09-14 NOTE — PATIENT INSTRUCTIONS
(J45.31) Mild persistent asthma with exacerbation  (primary encounter diagnosis)  Comment: We will treat empirically with a course of prednisone 40 mg x 5 days in addition to continued use of Advair and albuterol as needed.  Monitor and follow up next week if not improving  Plan: predniSONE (DELTASONE) 20 MG tablet            (R07.0) Throat pain  Comment:   Plan: Strep, Rapid Screen            (J06.9) Upper respiratory tract infection, unspecified type  Comment: We will treat with course of antibiotics  - azithromycin  Plan: azithromycin (ZITHROMAX) 250 MG tablet

## 2018-09-14 NOTE — PROGRESS NOTES
"  SUBJECTIVE:   Wilma Lorenzana is a 77 year old female who presents to clinic today for the following health issues:  Chief Complaint   Patient presents with     Cough     productive cough sine Monday 9-10--18        [facility]  CLINIC PROGRESS NOTE    Subjective:  Upper respiratory infection   Wilma Lorenzana has had worsening cough since Monday.  Cough has been productive.  No fever.  She has had temp 99.4.  Some shortness of breath.  No wheezing.  No chest pain.  She is using Advair twice per day and using albuterol \"alot\".  She does not have a nebulizer machine.        Past medical history, medications, allergies, social history, family history reviewed and updated in [facility] as of 9/14/2018 .    ROS  CONSTITUTIONAL: no fatigue, no unexpected change in weight  SKIN: no worrisome rashes, no worrisome moles, no worrisome lesions  EYES: no acute vision problems or changes  ENT: no ear problems, no mouth problems, no throat problems  RESP: as above   CV: no chest pain, no palpitations, no new or worsening peripheral edema  GI: no nausea, no vomiting, no constipation, no diarrhea  : no frequency, no dysuria, no hematuria  MS: not discussed  PSYCHIATRIC: no changes in mood or affect      Objective:  Vitals  /66 (Cuff Size: Adult Regular)  Pulse 93  Temp 99.4  F (37.4  C) (Oral)  Ht 5' (1.524 m)  Wt 113 lb (51.3 kg)  SpO2 96%  Breastfeeding? No  BMI 22.07 kg/m2  GEN: Alert Oriented x3 NAD  HEENT: Atraumatic, normocephalic, neck supple, no thyromegaly, negative cervical adenopathy  TM: TM bilaterally pearly and grey with normal light reflex  CV: RRR no murmurs or rubs  PULM: Diffuse wheezing all lung fields   ABD: Soft, nontender nondistended, no hepatosplenomegally  SKIN: No visible skin lesion or ulcerations  EXT: , no edema bilateral lower extremities  PSYCH: Mood good, affect mood congruent    No images are attached to the encounter.    No results found for this or any previous visit " (from the past 24 hour(s)).    Assessment/Plan:  Patient Instructions   (J45.31) Mild persistent asthma with exacerbation  (primary encounter diagnosis)  Comment: We will treat empirically with a course of prednisone 40 mg x 5 days in addition to continued use of Advair and albuterol as needed.  Monitor and follow up next week if not improving  Plan: predniSONE (DELTASONE) 20 MG tablet            (R07.0) Throat pain  Comment:   Plan: Strep, Rapid Screen            (J06.9) Upper respiratory tract infection, unspecified type  Comment: We will treat with course of antibiotics  - azithromycin  Plan: azithromycin (ZITHROMAX) 250 MG tablet               Follow up as needed     Disclaimer: This note consists of symbols derived from keyboarding, dictation and/or voice recognition software. As a result, there may be errors in the script that have gone undetected. Please consider this when interpreting information found in this chart.    Lavelle Aj MD  [phone]

## 2018-09-17 ENCOUNTER — TELEPHONE (OUTPATIENT)
Dept: FAMILY MEDICINE | Facility: CLINIC | Age: 78
End: 2018-09-17

## 2018-09-17 DIAGNOSIS — R05.9 COUGH: Primary | ICD-10-CM

## 2018-09-17 LAB
BACTERIA SPEC CULT: NORMAL
SPECIMEN SOURCE: NORMAL

## 2018-09-17 RX ORDER — CODEINE PHOSPHATE AND GUAIFENESIN 10; 100 MG/5ML; MG/5ML
1 SOLUTION ORAL EVERY 4 HOURS PRN
Qty: 180 ML | Refills: 1 | Status: SHIPPED | OUTPATIENT
Start: 2018-09-17 | End: 2019-01-28

## 2018-09-17 NOTE — TELEPHONE ENCOUNTER
Aristeo Martin's daughter came in and said that you had said you would prescribe her robitussin with codeine for her cough.  She is still not able to sleep at night due to coughing.  Please send Rx.     Macie Rodriguez MA

## 2018-09-20 NOTE — PROGRESS NOTES
Ensure delivered 09/13/18, Per [employer] member did not qualify for TENS unit.  CC notified.    Archana Roberts  CMS Supervisor  [facility]  [phone]

## 2018-09-28 DIAGNOSIS — M51.379 DEGENERATION OF LUMBAR OR LUMBOSACRAL INTERVERTEBRAL DISC: ICD-10-CM

## 2018-09-28 DIAGNOSIS — M54.2 CERVICALGIA: ICD-10-CM

## 2018-09-28 NOTE — TELEPHONE ENCOUNTER
Requested Prescriptions   Pending Prescriptions Disp Refills     traMADol (ULTRAM) 50 MG tablet [Pharmacy Med Name: TRAMADOL 50MG TABLETS] 60 tablet 0     Sig: TAKE 1 TABLET BY MOUTH EVERY 6 HOURS AS NEEDED FOR PAIN OR SEVERE PAIN    There is no refill protocol information for this order        Last Written Prescription Date:  5/22/18  Last Fill Quantity: 60 tablet,  # refills: 0   Last office visit: 9/14/2018 with prescribing provider:  Madai   Future Office Visit:

## 2018-10-01 RX ORDER — TRAMADOL HYDROCHLORIDE 50 MG/1
TABLET ORAL
Qty: 60 TABLET | Refills: 0 | Status: SHIPPED | OUTPATIENT
Start: 2018-10-01 | End: 2018-12-24

## 2018-10-15 ENCOUNTER — TELEPHONE (OUTPATIENT)
Dept: FAMILY MEDICINE | Facility: CLINIC | Age: 78
End: 2018-10-15

## 2018-10-15 NOTE — TELEPHONE ENCOUNTER
Reason for Call: Request for an order or referral:    Order or referral being requested: Referral to have a Asthma testing    Date needed: as soon as possible    Has the patient been seen by the PCP for this problem? YES    Additional comments: call once addressed    Phone number Patient can be reached at:  [phone]    Best Time:  ASAP    Can we leave a detailed message on this number?  YES    Call taken on 10/15/2018 at 12:24 PM by Lm Jolly

## 2018-10-15 NOTE — TELEPHONE ENCOUNTER
Left detailed message on [facility]'s ph for them to call [facility] [phone] to schedule the breathing test that Dr. Aj ordered last May.

## 2018-10-15 NOTE — TELEPHONE ENCOUNTER
I have ordered pulmonary function testing in May  - [facility]  to schedule    Lavelle Aj MD

## 2018-10-23 NOTE — TELEPHONE ENCOUNTER
The number listed for the patient to get her Pulmonary function test done is not in service    They gave her another number to call to schedule and we need to fax the order to this fax # [phone]

## 2018-10-30 ENCOUNTER — HOSPITAL ENCOUNTER (OUTPATIENT)
Dept: CARDIAC REHAB | Facility: CLINIC | Age: 78
End: 2018-10-30
Attending: INTERNAL MEDICINE
Payer: COMMERCIAL

## 2018-10-30 DIAGNOSIS — J45.20 INTERMITTENT ASTHMA WITHOUT COMPLICATION, UNSPECIFIED ASTHMA SEVERITY: ICD-10-CM

## 2018-10-30 DIAGNOSIS — J44.9 CHRONIC OBSTRUCTIVE PULMONARY DISEASE, UNSPECIFIED COPD TYPE (H): ICD-10-CM

## 2018-10-30 DIAGNOSIS — J44.9 CHRONIC OBSTRUCTIVE PULMONARY DISEASE, UNSPECIFIED COPD TYPE (H): Primary | ICD-10-CM

## 2018-10-30 PROCEDURE — 94060 EVALUATION OF WHEEZING: CPT

## 2018-10-30 PROCEDURE — 40001038 ZZH STATISTIC RESPIRATORY TESTING VISIT

## 2018-10-30 PROCEDURE — 94729 DIFFUSING CAPACITY: CPT

## 2018-11-05 ENCOUNTER — PATIENT OUTREACH (OUTPATIENT)
Dept: GERIATRIC MEDICINE | Facility: CLINIC | Age: 78
End: 2018-11-05

## 2018-11-05 NOTE — PROGRESS NOTES
[facility] Care Coordination Contact  Telephone Follow Up    Call from daughter Sergio who is calling to see if her mom can go to [facility]. Shared with daughter that ADC is paid for by EW, and her mom is not currently on a waiver. Reviewed that at the last assessment she did not meet criteria for EW. Offered to complete a new assessment (refusal in August) as this is the only way to determine eligibility for services. Sergio will talk with her mom and see if she is interested in a home visit. She will call back if her mom would like to schedule an appointment.     Jodi Ingram RN  [facility]  [phone]  Fax: [phone]

## 2018-11-09 LAB
DLCOUNC-%PRED-PRE: 43 %
DLCOUNC-PRE: 7.77 ML/MIN/MMHG
DLCOUNC-PRED: 18.01 ML/MIN/MMHG
ERV-%PRED-PRE: 63 %
ERV-PRE: 0.37 L
ERV-PRED: 0.59 L
EXPTIME-PRE: 7.07 SEC
FEF2575-%PRED-POST: 56 %
FEF2575-%PRED-PRE: 24 %
FEF2575-POST: 0.8 L/SEC
FEF2575-PRE: 0.36 L/SEC
FEF2575-PRED: 1.43 L/SEC
FEFMAX-%PRED-PRE: 34 %
FEFMAX-PRE: 1.53 L/SEC
FEFMAX-PRED: 4.48 L/SEC
FEV1-%PRED-PRE: 42 %
FEV1-PRE: 0.69 L
FEV1FEV6-PRE: 59 %
FEV1FEV6-PRED: 78 %
FEV1FVC-PRE: 58 %
FEV1FVC-PRED: 75 %
FEV1SVC-PRE: 49 %
FEV1SVC-PRED: 69 %
FIFMAX-PRE: 1.28 L/SEC
FVC-%PRED-PRE: 57 %
FVC-PRE: 1.19 L
FVC-PRED: 2.07 L
IC-%PRED-PRE: 58 %
IC-PRE: 1.03 L
IC-PRED: 1.77 L
VA-%PRED-PRE: 60 %
VA-PRE: 2.61 L
VC-%PRED-PRE: 59 %
VC-PRE: 1.41 L
VC-PRED: 2.36 L

## 2018-11-11 ENCOUNTER — TELEPHONE (OUTPATIENT)
Dept: FAMILY MEDICINE | Facility: CLINIC | Age: 78
End: 2018-11-11

## 2018-11-11 DIAGNOSIS — J45.20 INTERMITTENT ASTHMA WITHOUT COMPLICATION, UNSPECIFIED ASTHMA SEVERITY: ICD-10-CM

## 2018-11-11 DIAGNOSIS — J44.9 COPD, MODERATE (H): Primary | ICD-10-CM

## 2018-11-13 NOTE — TELEPHONE ENCOUNTER
Pt daughter is in agreement with pulmonology referral.  Can leave detailed voicemail if she does not answer with phone number for scheduling.    Routing to referrals for Pulm referral.  Gemma Ramsay RN

## 2018-11-14 NOTE — TELEPHONE ENCOUNTER
Dr Aj when referring to [place] [facility] they require a referral from the PCP before patient can schedule. Pending referral. Thanks    Seema Dean  Referral Coordinator

## 2018-11-14 NOTE — TELEPHONE ENCOUNTER
Notified daughter referral completed for [place] [facility]. Faxed referral so patient could make appt.    Seema Dean  Referral Coordinator

## 2018-11-27 ENCOUNTER — PATIENT OUTREACH (OUTPATIENT)
Dept: GERIATRIC MEDICINE | Facility: CLINIC | Age: 78
End: 2018-11-27

## 2018-11-27 ENCOUNTER — TRANSFERRED RECORDS (OUTPATIENT)
Dept: HEALTH INFORMATION MANAGEMENT | Facility: CLINIC | Age: 78
End: 2018-11-27

## 2018-11-27 NOTE — PROGRESS NOTES
[facility] Care Coordination Contact    Telephone Contact    Call from daughter Sergio who is calling to see about the TENS unit we ordered a while back. Shared with daughter that paperwork was submitted, but was again denied as she does not qualify for [place] coverage. Shared that writer and [facility] had tried to contact daughter with this update, but had been unable to reach her. Daughter expressed understanding.     Jodi Ingram RN  [facility]  [phone]  Fax: [phone]

## 2018-12-24 DIAGNOSIS — M54.2 CERVICALGIA: ICD-10-CM

## 2018-12-24 DIAGNOSIS — M51.379 DEGENERATION OF LUMBAR OR LUMBOSACRAL INTERVERTEBRAL DISC: ICD-10-CM

## 2018-12-24 DIAGNOSIS — G89.4 CHRONIC PAIN SYNDROME: ICD-10-CM

## 2018-12-26 RX ORDER — TRAMADOL HYDROCHLORIDE 50 MG/1
TABLET ORAL
Qty: 60 TABLET | Refills: 0 | Status: SHIPPED | OUTPATIENT
Start: 2018-12-26 | End: 2019-01-28

## 2018-12-26 NOTE — TELEPHONE ENCOUNTER
traMADol (ULTRAM) 50 MG tablet 60 tablet 0 10/1/2018           Last Written Prescription Date:  10/01/2018  Last Fill Quantity: 60,   # refills: 0  Last Office Visit: 09/14/2018  Future Office visit:   Unknown    Routing refill request to provider for review/approval because:  Drug not on the [facility], [facility] or [facility] refill protocol or controlled substance

## 2018-12-26 NOTE — TELEPHONE ENCOUNTER
Patient is followed by Lavelle Aj MD, MD for ongoing prescription of pain medication.  All refills should only be approved by this provider, or covering partner.    Medication(s): Tramadol.   Maximum quantity per month: 60  Clinic visit frequency required: Q 6  months     Controlled substance agreement:  Encounter-Level CSA:     There are no encounter-level csa.        Pain Clinic evaluation in the past: No    DIRE Total Score(s):  No flowsheet data found.    Last [facility] website verification: 12/26/18 [place]    [email or web address]

## 2019-01-14 ENCOUNTER — PATIENT OUTREACH (OUTPATIENT)
Dept: GERIATRIC MEDICINE | Facility: CLINIC | Age: 79
End: 2019-01-14

## 2019-01-14 NOTE — PROGRESS NOTES
[facility] Care Coordination Contact    VM received from daughter who is asking if client can attend ADC. Return call to daughter and call went straight to . Message left explaining that client would need to be opened to EW for ADC to be paid for. Reviewed that at last assessment client would not have met criteria to open EW, but this can be reassessed anually by completing a new assessment. Client refused last annual assessment in August. Offered to come out for new assessment if client would like. Encouraged a call back to schedule assessment if interested.     Jodi Ingram RN  [facility]  [phone]  Fax: [phone]

## 2019-01-17 NOTE — PROGRESS NOTES
[facility] Care Coordination Contact    Return call from daughter and annual visit has been scheduled for 1/24 at 1:30. Will request .     Jodi Ingram RN  [facility]  [phone]  Fax: [phone]

## 2019-01-24 ENCOUNTER — PATIENT OUTREACH (OUTPATIENT)
Dept: GERIATRIC MEDICINE | Facility: CLINIC | Age: 79
End: 2019-01-24

## 2019-01-25 DIAGNOSIS — J45.30 MILD PERSISTENT ASTHMA WITHOUT COMPLICATION: ICD-10-CM

## 2019-01-25 RX ORDER — ALBUTEROL SULFATE 90 UG/1
AEROSOL, METERED RESPIRATORY (INHALATION)
Qty: 18 G | Refills: 0 | Status: SHIPPED | OUTPATIENT
Start: 2019-01-25 | End: 2019-01-28

## 2019-01-25 NOTE — TELEPHONE ENCOUNTER
"Last Rx 6/1/18, #3 inhalers with 1 refill   Last OV 9/14/18    Routing refill request to provider for review/approval because:  ACT not current     Mee DURAN RN      Requested Prescriptions   Pending Prescriptions Disp Refills     VENTOLIN  (90 Base) MCG/ACT inhaler [Pharmacy Med Name: VENTOLIN HFA INH W/DOS CTR 200PUFFS] 18 g 0     Sig: INHALE 2 PUFFS BY MOUTH EVERY 4-6 HOURS AS NEEDED    Asthma Maintenance Inhalers - Anticholinergics Failed - 1/25/2019  3:42 PM       Failed - Asthma control assessment score within normal limits in last 6 months    Please review ACT score.          Passed - Patient is age 12 years or older       Passed - Medication is active on med list       Passed - Recent (6 mo) or future (30 days) visit within the authorizing provider's specialty    Patient had office visit in the last 6 months or has a visit in the next 30 days with authorizing provider or within the authorizing provider's specialty.  See \"Patient Info\" tab in inbasket, or \"Choose Columns\" in Meds & Orders section of the refill encounter.            ACT Total Scores 6/7/2013 8/26/2014 5/22/2018   ACT TOTAL SCORE 13 13 -   ASTHMA ER VISITS 0 = None 0 = None -   ASTHMA HOSPITALIZATIONS 0 = None 0 = None -   ACT TOTAL SCORE (Goal Greater than or Equal to 20) - - 18   In the past 12 months, how many times did you visit the emergency room for your asthma without being admitted to the hospital? - - 0   In the past 12 months, how many times were you hospitalized overnight because of your asthma? - - 0       "

## 2019-01-28 ENCOUNTER — TELEPHONE (OUTPATIENT)
Dept: FAMILY MEDICINE | Facility: CLINIC | Age: 79
End: 2019-01-28

## 2019-01-28 ENCOUNTER — OFFICE VISIT (OUTPATIENT)
Dept: FAMILY MEDICINE | Facility: CLINIC | Age: 79
End: 2019-01-28
Payer: COMMERCIAL

## 2019-01-28 VITALS
TEMPERATURE: 98.5 F | BODY MASS INDEX: 23.75 KG/M2 | DIASTOLIC BLOOD PRESSURE: 65 MMHG | SYSTOLIC BLOOD PRESSURE: 141 MMHG | WEIGHT: 121 LBS | OXYGEN SATURATION: 98 % | HEART RATE: 94 BPM | HEIGHT: 60 IN

## 2019-01-28 DIAGNOSIS — J45.30 MILD PERSISTENT ASTHMA WITHOUT COMPLICATION: ICD-10-CM

## 2019-01-28 DIAGNOSIS — J44.9 COPD, MODERATE (H): ICD-10-CM

## 2019-01-28 DIAGNOSIS — M51.379 DEGENERATION OF LUMBAR OR LUMBOSACRAL INTERVERTEBRAL DISC: ICD-10-CM

## 2019-01-28 DIAGNOSIS — G89.4 CHRONIC PAIN SYNDROME: ICD-10-CM

## 2019-01-28 DIAGNOSIS — I10 ESSENTIAL HYPERTENSION: ICD-10-CM

## 2019-01-28 DIAGNOSIS — M54.2 CERVICALGIA: ICD-10-CM

## 2019-01-28 DIAGNOSIS — I27.20 PULMONARY HYPERTENSION (H): ICD-10-CM

## 2019-01-28 DIAGNOSIS — K21.9 GASTROESOPHAGEAL REFLUX DISEASE WITHOUT ESOPHAGITIS: Primary | ICD-10-CM

## 2019-01-28 PROCEDURE — 99214 OFFICE O/P EST MOD 30 MIN: CPT | Performed by: INTERNAL MEDICINE

## 2019-01-28 RX ORDER — ALBUTEROL SULFATE 0.83 MG/ML
2.5 SOLUTION RESPIRATORY (INHALATION) EVERY 6 HOURS PRN
COMMUNITY
End: 2020-11-27

## 2019-01-28 RX ORDER — TRAMADOL HYDROCHLORIDE 50 MG/1
50 TABLET ORAL EVERY 6 HOURS PRN
Qty: 60 TABLET | Refills: 5 | Status: SHIPPED | OUTPATIENT
Start: 2019-01-28 | End: 2020-01-20

## 2019-01-28 RX ORDER — ALBUTEROL SULFATE 90 UG/1
2 AEROSOL, METERED RESPIRATORY (INHALATION) EVERY 4 HOURS PRN
Qty: 18 G | Refills: 11 | Status: SHIPPED | OUTPATIENT
Start: 2019-01-28 | End: 2020-01-21

## 2019-01-28 RX ORDER — METOPROLOL SUCCINATE 50 MG/1
50 TABLET, EXTENDED RELEASE ORAL DAILY
Qty: 90 TABLET | Refills: 3 | Status: SHIPPED | OUTPATIENT
Start: 2019-01-28 | End: 2019-12-31

## 2019-01-28 ASSESSMENT — MIFFLIN-ST. JEOR: SCORE: 960.35

## 2019-01-28 ASSESSMENT — ACTIVITIES OF DAILY LIVING (ADL): DEPENDENT_IADLS:: CLEANING;COOKING;LAUNDRY;SHOPPING;MEAL PREPARATION;MONEY MANAGEMENT;TRANSPORTATION

## 2019-01-28 NOTE — PATIENT INSTRUCTIONS
(K21.9) Gastroesophageal reflux disease without esophagitis  (primary encounter diagnosis)  Comment: We will try to increase the dose of omeprazole to 20 mg twice per day for the next two weeks to see if this helps with cough  Plan:     (I27.20) Pulmonary hypertension (H)  Comment: blood pressure and fluid volumes will be monitored   Plan:     (J44.9) COPD, moderate (H)  Comment: Recommend obtaining a peak flow meter to measure her breathing and consider increasing her dose of Advair to 500 mg if not achieving good results with 250   Plan: order for DME, order for DME            (I10) Essential hypertension  Comment: Continue metoprolol - dose adjusted to only once per day.   Consider holding lisinopril to help with cough  Plan: metoprolol succinate ER (TOPROL-XL) 50 MG 24 hr        tablet            (M51.37) LUMB/LUMBOSAC DISC DEGEN  Comment:   Plan: traMADol (ULTRAM) 50 MG tablet            (M54.2) Cervicalgia  Comment:   Plan: traMADol (ULTRAM) 50 MG tablet            (J45.30) Mild persistent asthma without complication  Comment:   Plan: albuterol (VENTOLIN HFA) 108 (90 Base) MCG/ACT         inhaler            Diclofenac gel sent to your pharmacy for joint pain

## 2019-01-28 NOTE — TELEPHONE ENCOUNTER
Prior Authorization Retail Medication Request    Medication/Dose: tramadol 50 mg  ICD code (if different than what is on RX):  M51.37, M54.2  Previously Tried and Failed:    Rationale:  Patient stable on current dose, need PA for qty over 7 days    Insurance Name:  [employer]  Insurance ID:  [id number]      Pharmacy Information (if different than what is on RX)  Name:  [facility]  Phone:  [phone]

## 2019-01-28 NOTE — PROGRESS NOTES
[facility]  CLINIC PROGRESS NOTE    Subjective:  Hypertension   Wilma Lorenzana presents to the clinic today accompanied by her daughter and an interpretor.  She has been taking her blood pressure medications, but there has been question as to whether her lisinopril has been causing cough.  Pulmonary hypertension (H)   She has not been active, so no symptoms of dyspnea on exertion at this time and normal fluids.  COPD, moderate (H)   Wilma Lorenzana was referred to pulmonologist and was recommended to use albuterol three times daily and also use Advair twice per day.  She was recommended to increase dose of Advair 250 mg twice per day.  Gastroesophageal reflux   She is taking omeprazole 20 mg daily.  Still having symptoms of gastroesophageal reflux.  She is also having dry cough.      Past medical history, medications, allergies, social history, family history reviewed and updated in EPIC as of 1/28/2019 .    ROS  CONSTITUTIONAL: + fatigue, no unexpected change in weight  SKIN: no worrisome rashes, no worrisome moles, no worrisome lesions  EYES: no acute vision problems or changes  ENT: Dry cough   RESP:   no shortness of breath - recent follow up in pulmonology as above   CV: no chest pain, no palpitations, no new or worsening peripheral edema  GI: no nausea, no vomiting, no constipation, no diarrhea + gastroesophageal reflux improved with omeprazole  : no frequency, no dysuria, no hematuria  MS: chronic pain knees, shoulders  PSYCHIATRIC: no changes in mood or affect      Objective:  Vitals  /65 (BP Location: Left arm, Patient Position: Chair, Cuff Size: Adult Regular)   Pulse 94   Temp 98.5  F (36.9  C) (Tympanic)   Ht 1.524 m (5')   Wt 54.9 kg (121 lb)   SpO2 98%   BMI 23.63 kg/m    GEN: Alert Oriented x3 NAD  HEENT: Atraumatic, normocephalic, neck supple, no thyromegaly, negative cervical adenopathy  CV: RRR no murmurs or rubs  PULM: CTA no wheezes or crackles  ABD: Soft,  nontender nondistended, no hepatosplenomegally  SKIN: No visible skin lesion or ulcerations  EXT:  no edema bilateral lower extremities  NEURO: Gait and station stable - using cane, No focal neurologic deficits  PSYCH: Mood good, affect mood congruent    No images are attached to the encounter.    No results found for this or any previous visit (from the past 24 hour(s)).    Assessment/Plan:  Patient Instructions   (K21.9) Gastroesophageal reflux disease without esophagitis  (primary encounter diagnosis)  Comment: We will try to increase the dose of omeprazole to 20 mg twice per day for the next two weeks to see if this helps with cough  Plan:     (I27.20) Pulmonary hypertension (H)  Comment: blood pressure and fluid volumes will be monitored   Plan:     (J44.9) COPD, moderate (H)  Comment: Recommend obtaining a peak flow meter to measure her breathing and consider increasing her dose of Advair to 500 mg if not achieving good results with 250   Plan: order for DME, order for DME            (I10) Essential hypertension  Comment: Continue metoprolol - dose adjusted to only once per day.   Consider holding lisinopril to help with cough  Plan: metoprolol succinate ER (TOPROL-XL) 50 MG 24 hr        tablet            (M51.37) LUMB/LUMBOSAC DISC DEGEN  Comment:   Plan: traMADol (ULTRAM) 50 MG tablet            (M54.2) Cervicalgia  Comment:   Plan: traMADol (ULTRAM) 50 MG tablet            (J45.30) Mild persistent asthma without complication  Comment:   Plan: albuterol (VENTOLIN HFA) 108 (90 Base) MCG/ACT         inhaler            Diclofenac gel sent to your pharmacy for joint pain    Follow up in 6 months    Disclaimer: This note consists of symbols derived from keyboarding, dictation and/or voice recognition software. As a result, there may be errors in the script that have gone undetected. Please consider this when interpreting information found in this chart.    Lavelle Aj MD  [phone]

## 2019-01-29 ENCOUNTER — PATIENT OUTREACH (OUTPATIENT)
Dept: GERIATRIC MEDICINE | Facility: CLINIC | Age: 79
End: 2019-01-29

## 2019-01-29 ENCOUNTER — TELEPHONE (OUTPATIENT)
Dept: FAMILY MEDICINE | Facility: CLINIC | Age: 79
End: 2019-01-29

## 2019-01-29 NOTE — TELEPHONE ENCOUNTER
Prior Authorization Retail Medication Request    Medication/Dose: Diclofenac 1% gel  ICD code (if different than what is on RX):  G89.4  Previously Tried and Failed: 5% Lidocaine patch  Rationale:  Joint pain in addition to lumbosacral disc degeneration and cervicalgia    Insurance Name:  [facility]  Insurance ID:  [id number]      Pharmacy Information (if different than what is on RX)  Name:  [id number]  Phone:  [phone]

## 2019-01-29 NOTE — PROGRESS NOTES
[facility] Care Coordination Contact    Faxed [facility] the 1288 and 1049 forms requesting to open member to Elderly Waiver.    Jodi Ingram RN  [facility]  [phone]  Fax: [phone]

## 2019-01-31 NOTE — TELEPHONE ENCOUNTER
Prior Authorization Not Needed per Insurance    Medication: Tramadol  Insurance Company: [employer] - Phone [phone] Fax [phone]  Expected CoPay:      Pharmacy Filling the Rx: [facility] [address] [place] [address] - [address]  Pharmacy Notified: Yes  Patient Notified: Yes

## 2019-01-31 NOTE — TELEPHONE ENCOUNTER
Central Prior Authorization Team   Phone: [phone]      PA Initiation    Medication: Tramadol  Insurance Company: [employer] - Phone [phone] Fax [phone]  Pharmacy Filling the Rx: [facility] [address] [place] [address] - [address]  Filling Pharmacy Phone: [phone]  Filling Pharmacy Fax:    Start Date: 1/31/2019      Manually faxed in P/A request.

## 2019-02-01 NOTE — PROGRESS NOTES
[facility] Care Coordination Contact    Return fax from [facility] can be opened with no obligation.     Call to number provided by  for an Citizen of [place] ADC- [phone].  [facility]  was left requesting a return call.     Jodi Ingram RN  [facility]  [phone]  Fax: [phone]

## 2019-02-01 NOTE — PROGRESS NOTES
[facility] Care Coordination Contact    [facility] Home Visit Assessment     Home visit for Health Risk Assessment with Wilma Lorenzana completed on January 24, 2019    Type of residence: Town home  Current living arrangement: I live in a private home with family.     Assessment completed with: Patient, Other (- Mo)    Current Care Plan  Member currently receiving the following home care services:   None  Member currently receiving the following community resources: Transportation Services (Using MM)    Medication Review  Medication reconciliation completed in Epic: Yes  Medication set-up & administration: Independent-does not set up.  Self-administers medications.  Medication Risk Assessment Medication (1 or more, place referral to MTM): N/A: No risk factors identified  MTM Referral Placed: No: No risk factors idenified    Mental/Behavioral Health   Depression Screening: See PHQ assessment flowsheet.   Mental health DX: No      Mental Health Diagnosis: No  Mental Health Services: None: No further intervention needed at this time.    Falls Assessment:   Fallen 2 or more times in the past year?: No   Any fall with injury in the past year?: No    ADL/IADL Dependencies:   Dependent ADLs: Bathing, Dressing  Dependent IADLs: Cleaning, Cooking, Laundry, Shopping, Meal Preparation, Money Management, Transportation    [facility] sponsored benefits: Shared information re: Silver Sneakers/gym memberships, ASA, Calcium +D.    PCA Assessment completed at visit: No     Elderly Waiver Eligibility: Yes-will open to EW    Care Plan & Recommendations: Will open to EW and then start ADC and [employer].     See LTCC for detailed assessment information.    Follow-Up Plan: Member informed of future contact, plan to f/u with member with a 6 month telephone assessment.  Contact information shared with member and family, encouraged member to call with any questions or concerns at any time.    [place]  care continuum providers: Please refer to Health Care Home on the Epic Problem List to view this patient's [facility] Care Plan Summary.    Jodi Ingram RN  [facility]  [phone]  Fax: [phone]

## 2019-02-04 NOTE — TELEPHONE ENCOUNTER
Central Prior Authorization Team   Phone: [phone]      PA Initiation    Medication: Diclofenac 1% gel-Initiated  Insurance Company: [employer] - Phone [phone] Fax [phone]  Pharmacy Filling the Rx: [facility] [address] [place] [address] - [address]  Filling Pharmacy Phone: [phone]  Filling Pharmacy Fax:    Start Date: 2/4/2019    Filled out PA form, manually faxed to [phone]

## 2019-02-04 NOTE — TELEPHONE ENCOUNTER
PRIOR AUTHORIZATION DENIED    Medication: Diclofenac 1% gel-DENIED    Denial Date: 2/4/2019    Denial Rational: Patient does not have an a medically-accepted indication.            Appeal Information:

## 2019-02-05 NOTE — PROGRESS NOTES
[facility] Care Coordination Contact    Return call from [facility]. Confirmed that they do have an [place] [facility] and have a contract with Medic. They would be able to accommodate client and provide transportation 3 days per week. They will reach out to daughter to arrange a time client could come for a tour. They will call writer back after a tour.     Jodi Ingram RN  [facility]  [phone]  Fax: [phone]

## 2019-02-14 NOTE — PROGRESS NOTES
[facility] Care Coordination Contact    Call to daughter to check status of ADC tour. VM was left requesting a return call to discuss.    Jodi Ingram RN  [facility]  [phone]  Fax: [phone]

## 2019-02-20 ENCOUNTER — PATIENT OUTREACH (OUTPATIENT)
Dept: GERIATRIC MEDICINE | Facility: CLINIC | Age: 79
End: 2019-02-20

## 2019-02-20 NOTE — PROGRESS NOTES
[facility] Care Coordination Contact    Received after visit chart from care coordinator.  Completed following tasks: Mailed copy of care plan to client, Updated services in access and Submitted referrals/auths for [employer]: Bus Passes  Chart was returned to CC.   Per CC, mailed LTCC caregiver assessment to Sergio Holder along with self-addressed, stamped return envelope.  Provider Signature - No POC Shared:  Member indicates that they do not want their POC shared with any EW providers.     Lary Mohan  Care Management Specialist  [facility]  [phone]

## 2019-02-20 NOTE — LETTER
February 20, 2019    Important Plan Information    ESTELITAMARK JONATAN ARMSTRONG  [address]  [place] [address]  Your Care Plan  Dear Wilma,  When we spoke recently, I promised to send you a Care Plan. The plan enclosed is a summary of our discussion. It includes the steps we agreed would help you meet your health goals. In addition, I can help you with:  Qsmipwb-D-VpwpMC  This program is available to members who need a ride to medical and dental visits. To schedule a ride, call [phone] or [phone] (toll free). TTY/TTD: 711. You can call Monday - Thursday 8 a.m. to 5 p.m. and Fridays 9 a.m. to 5 p.m.   [employer]   The [employer] program empowers you to improve your health through education and exercise. To learn more, visit [employer], or call [employer] Service at [phone] (toll free) (TTY:711) from 7 a.m. - 7 p.m. Central Time, Monday-Friday.  Health Care Directive   This form helps you outline your health care wishes. You can request a form from me and I will answer any questions you have before you discuss it with your doctor.   Annual Physical  Take a key step on your path to good health and set up an annual physical at your clinic.  Questions?  Call me at [phone] Monday-Friday between 8am and 5pm.  TTY/TTD: 711. As we discussed, I plan to be in touch with you again in 6 months to follow up via phone.  Sincerely,    Jodi Ingram RN    E-mail: [email or web address]  Phone: [phone]      [facility]          cc: member records            American Indians can continue to use [place] and [facility] ([facility]) clinics. We will not require prior approval or impose any conditions for you to get services at these clinics. For elders age 65 years and older this includes Elderly Waiver (EW) services accessed through the [place] [employer] If a doctor or other provider in a [place] or [facility] clinic refers you to a provider in our network, we will not  require you to see your primary care provider prior to the referral.    For accessible formats of this publication or assistance with equal or access to our services, visit [employer]/contactmedicaid, or call [phone] (toll free) or use your preferred relay service.    Auxiliary Aids and Services.   [facility] provides auxiliary aids and services, like qualified interpreters or information in accessible formats, free of charge and in a timely manner, to ensure an equal opportunity to participate in our health care programs. Contact [facility] Customer Service at [employer]/contactmedicaid or call [phone] (toll free) or use your preferred relay service.    Language Assistance Services.   [facility] provides translated documents and spoken language interpreting, free of charge and in a timely manner, when language assistance services are necessary to ensure limited English speakers have meaningful access to our information and services. Contact [employer] at [employer]/contactmedicaid or call [phone] (toll free) or use your preferred relay service.     Civil Rights Notice  Discrimination is against the law. [facility] does not discriminate on the basis of any of the following:    Race    Color    National Origin    Creed    Buddhist    Age    Public Assistance Status    Receipt of Health Care Services    Disability (including physical or mental impairment)    Sex (including sex stereotypes and gender identity)    Marital Status    Political Beliefs    Medical Condition    Genetic Information    Sexual Orientation    Claims Experience    Medical History    Health Status    Civil Rights Complaints.   You have the right to file a discrimination complaint if you believe you were treated in a discriminatory way by [facility]. You may contact any of the following four agencies directly to file a discrimination complaint.    U.S. Department of Health and Human Services  Office for Civil Rights (OCR)  You have  the right to file a complaint with the OCR, a federal agency, if you believe you have been discriminated against because of any of the following:    Race    Disability    Color    Sex (including sex stereotypes and gender identity)    National Origin    Age    Contact the OCR directly to file a complaint:         Director         U.S. Department of Health and Human Services  Office for Civil Rights         [address] [place] [address] [address] [address]         [phone] (Voice)         [phone] (TDD)         Complaint Portal - [email or web address]     [place] Department of Human Rights (MDHR)  In [place], you have the right to file a complaint with the [facility] if you believe you have been discriminated against because of any of the following:      Race    Color    National Origin    Mormon    Creed    Sex    Sexual Orientation    Marital Status    Public Assistance Status    Contact the [facility] directly to file a complaint:         [place] Department of Human Rights         [address] [address]         [phone] (voice)          [phone] (toll free)         711 or [phone] ([place] Relay)         [phone] (Fax)         [email or web address] (Email)     [place] Department of Human Services (DHS)  You have the right to file a complaint with [facility] if you believe you have been discriminated against in our health care programs because of any of the following:    Race    Color    National Origin    Creed    Mormon    Age    Public Assistance Status    Receipt of Health Care Services    Disability (including physical or mental impairment)    Sex (including sex stereotypes and gender identity)    Marital Status    Political Beliefs    Medical Condition    Genetic Information    Sexual Orientation    Claims Experience    Medical History    Health Status    Complaints must be in  writing and filed within 180 days of the date you discovered the alleged discrimination. The complaint must contain your name and address and describe the discrimination you are complaining about. After we get your complaint, we will review it and notify you in writing about whether we have authority to investigate. If we do, we will investigate the complaint.      [facility] will notify you in writing of the investigation s outcome. You have a right to appeal the outcome if you disagree with the decision. To appeal, you must send a written request to have [facility] review the investigation outcome period. Be brief and state why you disagree with the decision. Include additional information you think is important.      If you file a complaint in this way, the people who work for the agency named in the complaint cannot retaliate against you. This means they cannot punish you in any way for filing a complaint. Filing a complaint in this way does not stop you from seeking out other legal or administration actions.     Contact [facility] directly to file a discrimination complaint:        ATTN: Civil Rights Coordinator        [place] Department of Human Services        Equal Opportunity and Access Division        [address] [address]        [place] [address] [phone] (voice) or use your preferred relay service     [facility] Complaint Notice   Contact [facility] directly to file a discrimination complaint:  [facility] Civil Rights Coordinator  Mail Route   [address]  [place] [address] [phone] (voice) or use your preferred relay service  [email or web address]

## 2019-05-07 ENCOUNTER — PATIENT OUTREACH (OUTPATIENT)
Dept: GERIATRIC MEDICINE | Facility: CLINIC | Age: 79
End: 2019-05-07

## 2019-05-07 NOTE — PROGRESS NOTES
[facility] Care Coordination Contact    Call from daughter requesting that monthly Ensure order be put on hold- she is not drinking it fast enough and they have too much. Daughter will contact writer when they would like to start again. Email to Ector at [facility] requesting hold.     Jodi Ingram RN  [facility]  [phone]  Fax: [phone]

## 2019-05-16 DIAGNOSIS — I10 ESSENTIAL HYPERTENSION: ICD-10-CM

## 2019-05-16 RX ORDER — LISINOPRIL 5 MG/1
TABLET ORAL
Qty: 90 TABLET | Refills: 0 | Status: SHIPPED | OUTPATIENT
Start: 2019-05-16 | End: 2020-01-21

## 2019-05-16 NOTE — TELEPHONE ENCOUNTER
"Last Written Prescription Date:  5/22/18  Last Fill Quantity: 90 tablet,  # refills: 3   Last office visit: 1/28/2019 with prescribing provider:  Madai   Future Office Visit:      Requested Prescriptions   Pending Prescriptions Disp Refills     lisinopril (PRINIVIL/ZESTRIL) 5 MG tablet [Pharmacy Med Name: LISINOPRIL 5MG TABLETS] 90 tablet 0     Sig: TAKE 1 TABLET BY MOUTH EVERY DAY       ACE Inhibitors (Including Combos) Protocol Failed - 5/16/2019  9:26 AM        Failed - Blood pressure under 140/90 in past 12 months     BP Readings from Last 3 Encounters:   01/28/19 141/65   09/14/18 119/66   06/01/18 140/68                 Passed - Recent (12 mo) or future (30 days) visit within the authorizing provider's specialty     Patient had office visit in the last 12 months or has a visit in the next 30 days with authorizing provider or within the authorizing provider's specialty.  See \"Patient Info\" tab in inbasket, or \"Choose Columns\" in Meds & Orders section of the refill encounter.              Passed - Medication is active on med list        Passed - Patient is age 18 or older        Passed - No active pregnancy on record        Passed - Normal serum creatinine on file in past 12 months     Recent Labs   Lab Test 05/22/18  1154   CR 0.62             Passed - Normal serum potassium on file in past 12 months     Recent Labs   Lab Test 05/22/18  1154   POTASSIUM 4.5             Passed - No positive pregnancy test within past 12 months          "

## 2019-07-01 ENCOUNTER — PATIENT OUTREACH (OUTPATIENT)
Dept: GERIATRIC MEDICINE | Facility: CLINIC | Age: 79
End: 2019-07-01

## 2019-07-01 NOTE — PROGRESS NOTES
[facility] Care Coordination Contact    Called daughter to complete six month assessment and left a message requesting a return call.    Jodi Ingram RN  [facility]  [phone]  Fax: [phone]

## 2019-07-01 NOTE — LETTER
August 21, 2019    Important [employer] Information    WILMA JONATAN [facility]  [address]  [place] [address]  I've Tried to Contact You  Dear Wilma,  My name is Jodi Ingram RN, and I am your Care Coordinator. I have been trying to contact you, but have not been able to reach you.  As a Care Coordinator, I am here to help. My role is to make sure that your health plan is working for you. I am available to:     Review your health care needs with you over the phone or in-person     Provide support for and information about covered services or supplies to help keep you safe and healthy in your home    Answer questions about your insurance     Help you find a provider, such as a doctor or dentist, to meet your unique needs  I can also help you schedule a free physical at your clinic. To schedule an appointment, please call me at [phone] Monday-Friday between 8am-5pm TTY/TDD: 712.    Questions?  Please call me at the phone number listed above. If you d like, a friend or family member may call for you.  For general questions, call [employer] Customer Service at [phone] or [phone] (toll free) from 8 a.m. - 8 p.m. Central, seven days a week. Access to representatives may be limited at times. TTY/TDD: 711.  Sincerely,    Jodi Ingram RN    E-mail: [email or web address]  Phone: [phone]      [facility]          cc: member records                  Civil Rights Notice  Discrimination is against the law. [employer] does not discriminate on the basis of any of the following:    Race    Color    National Origin    Creed    Buddhist     Age    Public Assistance Status    Receipt of Health Care Services    Disability (including physical or mental impairment)    Sex (including sex stereotypes and gender identity)    Marital Status    Political Beliefs    Medical Condition    Genetic Information    Sexual Orientation    Claims Experience    Medical History    Health Status    Auxiliary Aids and  Services:  [employer] provides auxiliary aids and services, like qualified interpreters or information in accessible formats, free of charge and in a timely manner, to ensure an equal opportunity to participate in our health care programs. Contact [employer] Customer Service at [place]/contact medicaid or call [phone] (toll free); TTY:711 or at [place]/contactmedicaid.    Language Assistance Services:  [employer] provides translated documents and spoken language interpreting, free of charge and in a timely manner, when language assistance services are necessary to ensure limited English speakers have meaningful access to our information and services. Contact [employer] at [phone] (toll free); TTY: 710 or [place]/contactKindfulid.     Civil Rights Complaints  You have the right to file a discrimination complaint if you believe you were treated in a discriminatory way by [employer]. You may contact any of the following four agencies directly to file a discrimination complaint.    U.S. Department of Health and Human Services  Office for Civil Rights (OCR)  You have the right to file a complaint with the OCR, a federal agency, if you believe you have been discriminated against because of any of the following:    Race    Disability    Color    Sex    National Origin    Age      Contact the OCR directly to file a complaint:         Director         U.S. Department of Health and Human Services  Office for Civil Rights         [address] [place] [address]         [phone] (Voice)         [phone] (TDD)         Complaint Portal - [email or web address]     [place] Department of Human Rights (MDHR)  In [place], you have the right to file a complaint with the [facility] if you believe you have been discriminated against because of any of the following:      Race    Color    National Origin    Mosque    Creed    Sex    Sexual  Orientation    Marital Status    Public Assistance Status    Disability    Contact the MD directly to file a complaint:         [place] Department of Human Rights         [facility], [address] [place] [address]         [phone] (voice)          [phone] (toll free)         718 or [phone] ([place] Relay)         [phone] (Fax)         [email or web address] (Email)     [place] Department of Human Services (DHS)  You have the right to file a complaint with [facility] if you believe you have been discriminated against in our health care programs because of any of the following:    Race    Color    National Origin    Creed    Jew    Age    Public Assistance Status    Receipt of Health Care Services    Disability (including physical or mental impairment)    Sex (including sex stereotypes and gender identity)    Marital Status    Political Beliefs    Medical Condition    Genetic Information    Sexual Orientation    Claims Experience    Medical History    Health Status    Complaints must be in writing and filed within 180 days of the date you discovered the alleged discrimination. The complaint must contain your name and address and describe the discrimination you are complaining about. After we get your complaint, we will review it and notify you in writing about whether we have authority to investigate. If we do, we will investigate the complaint.      [facility] will notify you in writing of the investigation s outcome. You have a right to appeal the outcome if you disagree with the decision. To appeal, you must send a written request to have [facility] review the investigation outcome period. Be brief and state why you disagree with the decision. Include additional information you think is important.      If you file a complaint in this way, the people who work for the agency named in the complaint cannot retaliate against you. This means they cannot punish you in any way for filing a  complaint. Filing a complaint in this way does not stop you from seeking out other legal or administration actions.     Contact [facility] directly to file a discrimination complaint:        Civil Rights Coordinator        [facility] Services        Equal Opportunity and Access Division        [address] [address]        [place] [address] [phone] (voice) or use your preferred relay service     [employer] Complaint Notice   You have the right to file a complaint with [employer] if you believe you have been discriminated against because of any of the following:       Medical condition    Health status    Receipt of health care services    Claims experience    Medical history    Genetic information    Disability (including mental or physical impairment)    Marital status    Age    Sex (including sex stereotypes and gender identity)    Sexual orientation    National origin    Race    Color    Synagogue    Creed    Public assistance status    Political beliefs    You can file a complaint and ask for help in filing a complaint in person or by mail, phone, fax, or email at:     [employer] Civil Rights Coordinator  [employer]  [address], [place] [address] [phone] (voice and fax) or TTY:747  Email: [email or web address]    American Indians can continue to use [facility] and [facility] ([facility]) clinics. We will not require prior approval or impose any conditions for you to get services at these clinics. For elders age 65 years and older this includes Elderly Waiver (EW) services accessed through the [facility]. If a doctor or other provider in a [facility] or [facility] clinic refers you to a provider in our network, we will not require you to see your primary care provider prior to the referral.    For accessible formats of this publication or assistance with equal or access to our services, visit [employer]/contactmedicaid, or call [phone] (toll free) or use  your preferred relay service.

## 2019-07-15 NOTE — PROGRESS NOTES
[facility] Care Coordination Contact    Called daughter [employer] to complete six month assessment and left a message requesting a return call.    Jodi Ingram RN  [facility]  [phone]  Fax: [phone]

## 2019-08-19 NOTE — PROGRESS NOTES
[facility] Care Coordination Contact    Third call to daughter to complete six month assessment. VM left requesting a return call.     Will request CMS send letter.     oJdi Ingram RN  [facility]  [phone]  Fax: [phone]

## 2019-08-21 NOTE — PROGRESS NOTES
"[facility] Care Coordination Contact    Per CC, mailed client an \"Unable to Contact\" letter.    [facility]  Care Management Specialist  [facility]  [phone]      "

## 2019-11-05 ENCOUNTER — PATIENT OUTREACH (OUTPATIENT)
Dept: GERIATRIC MEDICINE | Facility: CLINIC | Age: 79
End: 2019-11-05

## 2019-11-05 NOTE — PROGRESS NOTES
[facility] Care Coordination Contact    Called mikey Hill to schedule annual HRA home visit- no answer and VM is full.    Jodi Ingram RN  [facility]  [phone]  Fax: [phone]

## 2019-11-05 NOTE — LETTER
December 2, 2019    Important [employer] Information    WILMA JONATAN [facility]  [address]  [place] [address]  I've Tried to Contact You  Dear Wilma,  My name is Jodi Ingram RN, and I am your Care Coordinator. I have been trying to contact you, but have not been able to reach you.  As a Care Coordinator, I am here to help. My role is to make sure that your health plan is working for you. I am available to:     Review your health care needs with you over the phone or in-person     Provide support for and information about covered services or supplies to help keep you safe and healthy in your home    Answer questions about your insurance     Help you find a provider, such as a doctor or dentist, to meet your unique needs  I can also help you schedule a free physical at your clinic. To schedule an appointment, please call me at [phone] Monday-Friday between 8am-5pm TTY/TDD: 718.    Questions?  Please call me at the phone number listed above. If you d like, a friend or family member may call for you.  For general questions, call [employer] Customer Service at [phone] or [phone] (toll free) from 8 a.m. - 8 p.m. Central, seven days a week. Access to representatives may be limited at times. TTY/TDD: 711.  Sincerely,    Jodi Ingram RN    E-mail: [email or web address]  Phone: [phone]      [facility]          cc: member records                  Civil Rights Notice  Discrimination is against the law. [employer] does not discriminate on the basis of any of the following:    Race    Color    National Origin    Creed    Anglican    Age    Public Assistance Status    Receipt of Health Care Services    Disability (including physical or mental impairment)    Sex (including sex stereotypes and gender identity)    Marital Status    Political Beliefs    Medical Condition    Genetic Information    Sexual Orientation    Claims Experience    Medical History    Health Status    Auxiliary Aids and  Services:  [employer] provides auxiliary aids and services, like qualified interpreters or information in accessible formats, free of charge and in a timely manner, to ensure an equal opportunity to participate in our health care programs. Contact [employer] Customer Service at [employer]/contact medicaid or call [phone] (toll free); TTY:711 or at [employer]/contactmedicaid.    Language Assistance Services:  [employer] provides translated documents and spoken language interpreting, free of charge and in a timely manner, when language assistance services are necessary to ensure limited English speakers have meaningful access to our information and services. Contact [employer] at [phone] (toll free); TTY: 717 or [employer]/contactGratafyid.     Civil Rights Complaints  You have the right to file a discrimination complaint if you believe you were treated in a discriminatory way by [employer]. You may contact any of the following four agencies directly to file a discrimination complaint.    U.S. Department of Health and Human Services  Office for Civil Rights (OCR)  You have the right to file a complaint with the OCR, a federal agency, if you believe you have been discriminated against because of any of the following:    Race    Disability    Color    Sex    National Origin    Age      Contact the OCR directly to file a complaint:         Director         U.S. Department of Health and Human Services  Office for Civil Rights         [address] [place] [address]         [phone] (Voice)         [phone] (TDD)         Complaint Portal - [email or web address]     [place] Department of Human Rights (MDHR)  In [place], you have the right to file a complaint with the [facility] if you believe you have been discriminated against because of any of the following:      Race    Color    National Origin    Nondenominational    Creed    Sex    Sexual  Orientation    Marital Status    Public Assistance Status    Disability    Contact the MD directly to file a complaint:         [place] Department of Human Rights         [facility], [address] [place] [address]         [phone] (voice)          [phone] (toll free)         719 or [phone] ([place] Relay)         [phone] (Fax)         [email or web address] (Email)     [place] Department of Human Services (DHS)  You have the right to file a complaint with [facility] if you believe you have been discriminated against in our health care programs because of any of the following:    Race    Color    National Origin    Creed    Synagogue    Age    Public Assistance Status    Receipt of Health Care Services    Disability (including physical or mental impairment)    Sex (including sex stereotypes and gender identity)    Marital Status    Political Beliefs    Medical Condition    Genetic Information    Sexual Orientation    Claims Experience    Medical History    Health Status    Complaints must be in writing and filed within 180 days of the date you discovered the alleged discrimination. The complaint must contain your name and address and describe the discrimination you are complaining about. After we get your complaint, we will review it and notify you in writing about whether we have authority to investigate. If we do, we will investigate the complaint.      [facility] will notify you in writing of the investigation s outcome. You have a right to appeal the outcome if you disagree with the decision. To appeal, you must send a written request to have [facility] review the investigation outcome period. Be brief and state why you disagree with the decision. Include additional information you think is important.      If you file a complaint in this way, the people who work for the agency named in the complaint cannot retaliate against you. This means they cannot punish you in any way for filing a  complaint. Filing a complaint in this way does not stop you from seeking out other legal or administration actions.     Contact [facility] directly to file a discrimination complaint:        Civil Rights Coordinator        [facility]        Equal Opportunity and Access Division        [address] [address]        [place] [address] [phone] (voice) or use your preferred relay service     [employer] Complaint Notice   You have the right to file a complaint with [employer] if you believe you have been discriminated against because of any of the following:       Medical condition    Health status    Receipt of health care services    Claims experience    Medical history    Genetic information    Disability (including mental or physical impairment)    Marital status    Age    Sex (including sex stereotypes and gender identity)    Sexual orientation    National origin    Race    Color    Adventist    Creed    Public assistance status    Political beliefs    You can file a complaint and ask for help in filing a complaint in person or by mail, phone, fax, or email at:     [employer] Civil Rights Coordinator  [email or web address]  [address], [place] [address] [phone] (voice and fax) or TTY:853  Email: [email or web address]    American Indians can continue to use [facility] and [facility] ([facility]) clinics. We will not require prior approval or impose any conditions for you to get services at these clinics. For elders age 65 years and older this includes Elderly Waiver (EW) services accessed through the [facility]. If a doctor or other provider in a [facility] or [facility] clinic refers you to a provider in our network, we will not require you to see your primary care provider prior to the referral.    For accessible formats of this publication or assistance with equal or access to our services, visit [employer]/contactmedicaid, or call [phone] (toll free) or use  your preferred relay service.

## 2019-11-22 ENCOUNTER — PATIENT OUTREACH (OUTPATIENT)
Dept: GERIATRIC MEDICINE | Facility: CLINIC | Age: 79
End: 2019-11-22

## 2019-11-22 NOTE — PROGRESS NOTES
[facility] Care Coordination Contact    Internal CC change effective 12/1/19.  CC Change letter sent.  Cally Srivastava  Case Management Specialist  [facility]  [phone]

## 2019-11-22 NOTE — LETTER
November 22, 2019    Important [employer] Information    WILMA JONATAN [facility]  [address]  [place] [address]    Your New Care Coordinator  Dear Wilma,  My name is Dileep Jorge RN and I am your new Care Coordinator. You may reach me by calling [phone]. I will be in touch with you shortly to address any questions you may have.   I have also been in contact with Jodi Ingram RN, your previous care coordinator, to ensure a smooth transition.  Questions?  Call me at [phone] Monday-Friday between 8am and 5pm. TTY/TDD: 711. I look forward to working with you as a [employer]  member.  Sincerely,    Dileep Jorge RN  [phone]  [email or web address]          cc: member records                                      Civil Rights Notice  Discrimination is against the law. [employer] does not discriminate on the basis of any of the following:    Race    Color    National Origin    Creed    Anabaptism    Age    Public Assistance Status    Receipt of Health Care Services    Disability (including physical or mental impairment)    Sex (including sex stereotypes and gender identity)    Marital Status    Political Beliefs    Medical Condition    Genetic Information    Sexual Orientation    Claims Experience    Medical History    Health Status    Auxiliary Aids and Services:  [employer] provides auxiliary aids and services, like qualified interpreters or information in accessible formats, free of charge and in a timely manner, to ensure an equal opportunity to participate in our health care programs. Contact [employer] Customer Service at [place]/contact medicaid or call [phone] (toll free); TTY:711 or at [place]/contactmedicaid.    Language Assistance Services:  [employer] provides translated documents and spoken language interpreting, free of charge and in a timely manner, when language assistance services are necessary to ensure limited English speakers have meaningful access to our information and services.  Contact [employer] at -[phone] (toll free); TTY: 711 or [employer]/contactmedicaid.     Civil Rights Complaints  You have the right to file a discrimination complaint if you believe you were treated in a discriminatory way by [employer]. You may contact any of the following four agencies directly to file a discrimination complaint.    U.S. Department of Health and Human Services  Office for Civil Rights (OCR)  You have the right to file a complaint with the OCR, a federal agency, if you believe you have been discriminated against because of any of the following:    Race    Disability    Color    Sex    National Origin    Age      Contact the OCR directly to file a complaint:         Director         U.S. Department of Health and Human Services  Office for Civil Rights         [address] [place] [address] [address] [address]         [phone] (Voice)         [phone] (TDD)         Complaint Portal - [email or web address]     [place] Department of Human Rights (MDHR)  In [place], you have the right to file a complaint with the [facility] if you believe you have been discriminated against because of any of the following:      Race    Color    National Origin    Moravian    Creed    Sex    Sexual Orientation    Marital Status    Public Assistance Status    Disability    Contact the [facility] directly to file a complaint:         [place] Department of Human Rights         [address] [address]         [phone] (voice)          [phone] (toll free)         711 or [phone] ([place] Relay)         [phone] (Fax)         [email or web address] (Email)     [place] Department of Human Services (DHS)  You have the right to file a complaint with [facility] if you believe you have been discriminated against in our health care programs because of any of the following:    Race    Color    National  Origin    Creed    Lutheran    Age    Public Assistance Status    Receipt of Health Care Services    Disability (including physical or mental impairment)    Sex (including sex stereotypes and gender identity)    Marital Status    Political Beliefs    Medical Condition    Genetic Information    Sexual Orientation    Claims Experience    Medical History    Health Status    Complaints must be in writing and filed within 180 days of the date you discovered the alleged discrimination. The complaint must contain your name and address and describe the discrimination you are complaining about. After we get your complaint, we will review it and notify you in writing about whether we have authority to investigate. If we do, we will investigate the complaint.      [facility] will notify you in writing of the investigation s outcome. You have a right to appeal the outcome if you disagree with the decision. To appeal, you must send a written request to have [facility] review the investigation outcome period. Be brief and state why you disagree with the decision. Include additional information you think is important.      If you file a complaint in this way, the people who work for the agency named in the complaint cannot retaliate against you. This means they cannot punish you in any way for filing a complaint. Filing a complaint in this way does not stop you from seeking out other legal or administration actions.     Contact [facility] directly to file a discrimination complaint:        Civil Rights Coordinator        [place] Department of Human Services        Equal Opportunity and Access Division        [address] [address]        [place] [address] [phone] (voice) or use your preferred relay service     [employer] Complaint Notice   You have the right to file a complaint with [employer] if you believe you have been discriminated against because of any of the following:       Medical condition    Health status    Receipt of health care  services    Claims experience    Medical history    Genetic information    Disability (including mental or physical impairment)    Marital status    Age    Sex (including sex stereotypes and gender identity)    Sexual orientation    National origin    Race    Color    Methodist    Creed    Public assistance status    Political beliefs    You can file a complaint and ask for help in filing a complaint in person or by mail, phone, fax, or email at:     [employer] Civil Rights Coordinator  [employer] [facility]  [address], [place] [address] [phone] (voice and fax) or TTY:436  Email: [email or web address]    American Indians can continue to use [facility] and [facility] (S) clinics. We will not require prior approval or impose any conditions for you to get services at these clinics. For elders age 65 years and older this includes Elderly Waiver (EW) services accessed through the [facility]. If a doctor or other provider in a [facility] or [facility] clinic refers you to a provider in our network, we will not require you to see your primary care provider prior to the referral.    For accessible formats of this publication or assistance with equal or access to our services, visit [employer]/contactmedicaid, or call [phone] (toll free) or use your preferred relay service.

## 2019-11-26 NOTE — PROGRESS NOTES
[facility] Care Coordination Contact    Second call to daughter to schedule annual assessment. No answer and VM box is full.     Jodi Ingram RN  [facility]  [phone]  Fax: [phone]

## 2019-12-02 NOTE — PROGRESS NOTES
[facility] Care Coordination Contact    Third call to daughter to schedule HRA. No answer and VM is full. Requested that CMS send UTC letter.     Completed 4 attempts to reach client with no response.  Member is officially unable to contact effective today 12/2/19.  Completed MMIS entry.  Completed health plan required UTC POC.    Follow-up Plan: CC will attempt to reach member in six months.    Jodi Ingram RN  [facility]  [phone]  Fax: [phone]

## 2019-12-02 NOTE — PROGRESS NOTES
"[facility] Care Coordination Contact    Per CC, mailed client an \"Unable to Contact\" letter.    [facility]  Care Management Specialist  [facility]  [phone]      "

## 2019-12-30 DIAGNOSIS — I10 ESSENTIAL HYPERTENSION: ICD-10-CM

## 2019-12-31 RX ORDER — METOPROLOL SUCCINATE 50 MG/1
TABLET, EXTENDED RELEASE ORAL
Qty: 90 TABLET | Refills: 0 | Status: SHIPPED | OUTPATIENT
Start: 2019-12-31 | End: 2020-01-21

## 2019-12-31 NOTE — TELEPHONE ENCOUNTER
Prescription approved per [facility] [place] [place] Refill Protocol.  Radha WHITNEY RN

## 2019-12-31 NOTE — TELEPHONE ENCOUNTER
"metoprolol succinate ER (TOPROL-XL) 50 MG 24 hr tablet 90 tablet 3 1/28/2019 1/28/2020 --   Sig - Route: Take 1 tablet (50 mg) by mouth daily - Oral     Last Written Prescription Date:  1-  Last Fill Quantity: 90,  # refills: 3   Last office visit: 1/28/2019 with prescribing provider:     Future Office Visit:  Unknown    Requested Prescriptions   Pending Prescriptions Disp Refills     metoprolol succinate ER (TOPROL-XL) 50 MG 24 hr tablet [Pharmacy Med Name: METOPROLOL ER SUCCINATE 50MG TABS] 90 tablet 3     Sig: TAKE ONE TABLET BY MOUTH DAILY       Beta-Blockers Protocol Failed - 12/30/2019  2:14 PM        Failed - Blood pressure under 140/90 in past 12 months     BP Readings from Last 3 Encounters:   01/28/19 141/65   09/14/18 119/66   06/01/18 140/68                 Passed - Patient is age 6 or older        Passed - Recent (12 mo) or future (30 days) visit within the authorizing provider's specialty     Patient has had an office visit with the authorizing provider or a provider within the authorizing providers department within the previous 12 mos or has a future within next 30 days. See \"Patient Info\" tab in inbasket, or \"Choose Columns\" in Meds & Orders section of the refill encounter.              Passed - Medication is active on med list          "

## 2020-01-02 PROBLEM — Z76.89 HEALTH CARE HOME: Status: ACTIVE | Noted: 2017-03-01

## 2020-01-13 ENCOUNTER — TELEPHONE (OUTPATIENT)
Dept: FAMILY MEDICINE | Facility: CLINIC | Age: 80
End: 2020-01-13

## 2020-01-13 ENCOUNTER — TELEPHONE (OUTPATIENT)
Dept: CARDIOLOGY | Facility: CLINIC | Age: 80
End: 2020-01-13

## 2020-01-13 DIAGNOSIS — I34.0 MITRAL VALVE INSUFFICIENCY, UNSPECIFIED ETIOLOGY: ICD-10-CM

## 2020-01-13 DIAGNOSIS — I35.9 AORTIC VALVE DISORDER: Primary | ICD-10-CM

## 2020-01-13 NOTE — TELEPHONE ENCOUNTER
Patient's daughter called regarding referral. Informed that referral has been placed and patient has already been scheduled for cardiology appointment this Friday 01/17/2020. Clinic number given in case they have any questions.    Tony Rucker CMA on 1/13/2020 at 12:43 PM

## 2020-01-13 NOTE — TELEPHONE ENCOUNTER
Patient's daughter called requesting an OV last OV was in 2017 with Dr. Carrasco for mitral valve insufficiency.  Daughter would like to see a different Cardiologist.

## 2020-01-13 NOTE — TELEPHONE ENCOUNTER
Reason for Call: Request for an order or referral:    Order or referral being requested: Cardiology    Date needed: as soon as possible    Has the patient been seen by the PCP for this problem? YES    Additional comments: Daughter Sergio (C2C SIGNED) calling in to get a referral for Cardiology.     Please call when set.     Phone number Patient can be reached at:  Cell number on file:    Telephone Information:   Mobile [phone]       Best Time:  any    Can we leave a detailed message on this number?  YES    Call taken on 1/13/2020 at 9:58 AM by Everett Barron

## 2020-01-13 NOTE — TELEPHONE ENCOUNTER
Patient's daughter requesting referral for patient to see cardiology. Please advise.    Tony Rucker CMA on 1/13/2020 at 10:00 AM

## 2020-01-18 DIAGNOSIS — M51.379 DEGENERATION OF LUMBAR OR LUMBOSACRAL INTERVERTEBRAL DISC: ICD-10-CM

## 2020-01-18 DIAGNOSIS — M54.2 CERVICALGIA: ICD-10-CM

## 2020-01-18 NOTE — TELEPHONE ENCOUNTER
Requested Prescriptions   Pending Prescriptions Disp Refills     traMADol (ULTRAM) 50 MG tablet [Pharmacy Med Name: TRAMADOL 50MG TABLETS] 60 tablet      Sig: TAKE ONE TABLET BY MOUTH EVERY 6 HOURS AS NEEDED FOR SEVERE PAIN       There is no refill protocol information for this order        Last Written Prescription Date:  1/28/19  Last Fill Quantity: 60 tablet,  # refills: 5   Last office visit: 1/28/2019 with prescribing provider:  Madai   Future Office Visit:   Next 5 appointments (look out 90 days)    Jan 21, 2020 12:30 PM CST  Office Visit with Lavelle Aj MD  [facility] ([facility] [address] [facility] [address] [phone]

## 2020-01-20 RX ORDER — TRAMADOL HYDROCHLORIDE 50 MG/1
TABLET ORAL
Qty: 30 TABLET | Refills: 0 | Status: SHIPPED | OUTPATIENT
Start: 2020-01-20 | End: 2020-03-11

## 2020-01-21 ENCOUNTER — OFFICE VISIT (OUTPATIENT)
Dept: CARDIOLOGY | Facility: CLINIC | Age: 80
End: 2020-01-21
Payer: COMMERCIAL

## 2020-01-21 ENCOUNTER — OFFICE VISIT (OUTPATIENT)
Dept: FAMILY MEDICINE | Facility: CLINIC | Age: 80
End: 2020-01-21
Payer: COMMERCIAL

## 2020-01-21 VITALS
HEIGHT: 60 IN | WEIGHT: 120.6 LBS | TEMPERATURE: 97.2 F | BODY MASS INDEX: 23.68 KG/M2 | SYSTOLIC BLOOD PRESSURE: 127 MMHG | OXYGEN SATURATION: 99 % | DIASTOLIC BLOOD PRESSURE: 61 MMHG | HEART RATE: 79 BPM

## 2020-01-21 VITALS
SYSTOLIC BLOOD PRESSURE: 130 MMHG | DIASTOLIC BLOOD PRESSURE: 60 MMHG | WEIGHT: 120 LBS | BODY MASS INDEX: 23.56 KG/M2 | HEART RATE: 66 BPM | HEIGHT: 60 IN

## 2020-01-21 DIAGNOSIS — G89.4 CHRONIC PAIN SYNDROME: ICD-10-CM

## 2020-01-21 DIAGNOSIS — I05.0 MITRAL VALVE STENOSIS, UNSPECIFIED ETIOLOGY: Primary | ICD-10-CM

## 2020-01-21 DIAGNOSIS — I10 ESSENTIAL HYPERTENSION: ICD-10-CM

## 2020-01-21 DIAGNOSIS — J44.9 COPD, MODERATE (H): ICD-10-CM

## 2020-01-21 DIAGNOSIS — I35.9 AORTIC VALVE DISORDER: ICD-10-CM

## 2020-01-21 DIAGNOSIS — J45.20 INTERMITTENT ASTHMA WITHOUT COMPLICATION, UNSPECIFIED ASTHMA SEVERITY: Primary | ICD-10-CM

## 2020-01-21 DIAGNOSIS — R00.2 PALPITATIONS: ICD-10-CM

## 2020-01-21 DIAGNOSIS — K21.9 GASTROESOPHAGEAL REFLUX DISEASE WITHOUT ESOPHAGITIS: ICD-10-CM

## 2020-01-21 DIAGNOSIS — I27.20 PULMONARY HYPERTENSION (H): ICD-10-CM

## 2020-01-21 DIAGNOSIS — J45.30 MILD PERSISTENT ASTHMA WITHOUT COMPLICATION: ICD-10-CM

## 2020-01-21 DIAGNOSIS — M81.0 SENILE OSTEOPOROSIS: ICD-10-CM

## 2020-01-21 DIAGNOSIS — Z13.6 CARDIOVASCULAR SCREENING; LDL GOAL LESS THAN 160: ICD-10-CM

## 2020-01-21 DIAGNOSIS — I34.0 MITRAL VALVE INSUFFICIENCY, UNSPECIFIED ETIOLOGY: ICD-10-CM

## 2020-01-21 DIAGNOSIS — I05.0 MITRAL VALVE STENOSIS, UNSPECIFIED ETIOLOGY: ICD-10-CM

## 2020-01-21 LAB
ERYTHROCYTE [DISTWIDTH] IN BLOOD BY AUTOMATED COUNT: 13.8 % (ref 10–15)
HCT VFR BLD AUTO: 36.9 % (ref 35–47)
HGB BLD-MCNC: 11.7 G/DL (ref 11.7–15.7)
MCH RBC QN AUTO: 31.5 PG (ref 26.5–33)
MCHC RBC AUTO-ENTMCNC: 31.7 G/DL (ref 31.5–36.5)
MCV RBC AUTO: 99 FL (ref 78–100)
PLATELET # BLD AUTO: 173 10E9/L (ref 150–450)
RBC # BLD AUTO: 3.72 10E12/L (ref 3.8–5.2)
WBC # BLD AUTO: 4.2 10E9/L (ref 4–11)

## 2020-01-21 PROCEDURE — 85027 COMPLETE CBC AUTOMATED: CPT | Performed by: INTERNAL MEDICINE

## 2020-01-21 PROCEDURE — 36415 COLL VENOUS BLD VENIPUNCTURE: CPT | Performed by: INTERNAL MEDICINE

## 2020-01-21 PROCEDURE — 80061 LIPID PANEL: CPT | Performed by: INTERNAL MEDICINE

## 2020-01-21 PROCEDURE — 99214 OFFICE O/P EST MOD 30 MIN: CPT | Performed by: INTERNAL MEDICINE

## 2020-01-21 PROCEDURE — 80053 COMPREHEN METABOLIC PANEL: CPT | Performed by: INTERNAL MEDICINE

## 2020-01-21 RX ORDER — IBANDRONATE SODIUM 150 MG/1
TABLET, FILM COATED ORAL
Qty: 3 TABLET | Refills: 3 | Status: SHIPPED | OUTPATIENT
Start: 2020-01-21 | End: 2020-06-24

## 2020-01-21 RX ORDER — METOPROLOL SUCCINATE 50 MG/1
50 TABLET, EXTENDED RELEASE ORAL DAILY
Qty: 90 TABLET | Refills: 3 | Status: ON HOLD | OUTPATIENT
Start: 2020-01-21 | End: 2020-08-25

## 2020-01-21 RX ORDER — ALBUTEROL SULFATE 90 UG/1
2 AEROSOL, METERED RESPIRATORY (INHALATION) EVERY 4 HOURS PRN
Qty: 54 G | Refills: 3 | Status: SHIPPED | OUTPATIENT
Start: 2020-01-21 | End: 2020-11-27

## 2020-01-21 RX ORDER — LISINOPRIL 5 MG/1
5 TABLET ORAL DAILY
Qty: 90 TABLET | Refills: 3 | Status: SHIPPED | OUTPATIENT
Start: 2020-01-21 | End: 2020-07-01

## 2020-01-21 ASSESSMENT — MIFFLIN-ST. JEOR
SCORE: 953.54
SCORE: 950.82

## 2020-01-21 NOTE — PROGRESS NOTES
HPI and Plan:   See dictation:[id number]    Orders Placed This Encounter   Procedures     Follow-Up with Cardiac Advanced Practice Provider     [facility] Adult Pediatric Greater than 48 hrs     Echocardiogram Complete       No orders of the defined types were placed in this encounter.      There are no discontinued medications.      Encounter Diagnoses   Name Primary?     Mitral valve stenosis, unspecified etiology Yes     Mitral valve insufficiency, unspecified etiology      Pulmonary hypertension (H)      Aortic valve disorder      Palpitations      Essential hypertension        CURRENT MEDICATIONS:  Current Outpatient Medications   Medication Sig Dispense Refill     albuterol (PROVENTIL) (2.5 MG/3ML) 0.083% neb solution Take 2.5 mg by nebulization every 6 hours as needed for shortness of breath / dyspnea or wheezing       albuterol (VENTOLIN HFA) 108 (90 Base) MCG/ACT inhaler Inhale 2 puffs into the lungs every 4 hours as needed for shortness of breath / dyspnea or wheezing 18 g 11     Cholecalciferol (VITAMIN D) 2000 units tablet Take 2,000 Units by mouth daily 100 tablet 3     ferrous sulfate (IRON) 325 (65 Fe) MG tablet Take 1 tablet (325 mg) by mouth daily (with breakfast) 90 tablet 3     fluticasone-salmeterol (ADVAIR DISKUS) 250-50 MCG/DOSE diskus inhaler INHALE 1 PUFF BY MOUTH EVERY 12 HOURS, IN THE MORNING AND IN THE EVENING, ABOUT 12 HOURS APART 3 Inhaler 3     IBANdronate (BONIVA) 150 MG tablet TAKE 1 TABLET BY MOUTH EVERY 30 DAYS 1 HR BEFORE BREAKFAST WITH 8OZ OF WATER AND SIT UPRIGHT 30 MIN 3 tablet 3     lisinopril (PRINIVIL/ZESTRIL) 5 MG tablet TAKE 1 TABLET BY MOUTH EVERY DAY 90 tablet 0     metoprolol succinate ER (TOPROL-XL) 50 MG 24 hr tablet TAKE ONE TABLET BY MOUTH DAILY 90 tablet 0     multivitamin, therapeutic with minerals (THERA-VIT-M) TABS Take 1 tablet by mouth daily       omeprazole (PRILOSEC) 20 MG CR capsule TAKE 1 CAPSULE (20 MG) BY MOUTH DAILY 90 capsule 3     senna-docusate  (SENOKOT-S;PERICOLACE) 8.6-50 MG per tablet Take 1 tablet by mouth 2 times daily 60 tablet 11     traMADol (ULTRAM) 50 MG tablet TAKE ONE TABLET BY MOUTH EVERY 6 HOURS AS NEEDED FOR SEVERE PAIN 30 tablet 0     diclofenac (VOLTAREN) 1 % topical gel Place onto the skin 4 times daily USE FOR KNEES 100 g 11     order for DME Equipment being ordered: Spacer for inhaler 1 each 0     order for DME Equipment being ordered: Peak Flow Meter 1 each 0     order for DME Equipment being ordered:     Ensure nutritional supplement - 1/day 30 each 11     order for DME Equipment being ordered: TENS 1 each 0     ORDER FOR DME 1 blood pressure cuff, automatic digital reading for home use 1 Device 0       ALLERGIES     Allergies   Allergen Reactions     Alendronate Sodium      Worse acid reflux     Food      Meat, eggs, dairy       PAST MEDICAL HISTORY:  Past Medical History:   Diagnosis Date     ABNORMAL ECHO 5/04 5/30/2004    Mild-mod MV Ca++, Mild MR, Mild-Mod TR, Mild-Mod LA, Mild-Mod PHTN, Mod AV sclerosis, Mild EAD L>R, EF normal 70%      Aortic valve disorder 5/30/2004    Echo, rec repeat 2 yrs  Problem list name updated by automated process. Provider to review     AORTIC VALVE SCLEROSIS 5/30/2004    needs ABX prophylaxis     Chronic pain syndrome 3/16/2018    Patient is followed by Lavelle Aj MD, MD for ongoing prescription of pain medication.  All refills should only be approved by this provider, or covering partner.  Medication(s): Tramadol.  Maximum quantity per month: 60 Clinic visit frequency required: Q 6  months   Controlled substance agreement: Encounter-Level CSA:   There are no encounter-level csa.   Pain Clinic evaluation in the     COMPRESSION FRX T5      COPD, moderate (H) 11/1/2015     Degeneration of lumbar or lumbosacral intervertebral disc 11/04    L5-S1 mild-mod,L3-4/L4-5 mod. bilat foraminal stenosis , L3-4/ L4-5, mod- sev. cent stenosis     Esophageal reflux 8/9/2007     HELICOBACTER PYLORI  INFECTION 11/1/2005     HTN (hypertension) 6/7/2013     Iron deficiency anemia, unspecified iron deficiency anemia type 6/1/2018     JOINT PAIN BILATERAL THUMBS 2/9/2004     L SHOULDER PAIN 2/9/2004     Mild persistent asthma      Mitral valve insufficiency, unspecified etiology 6/21/2017     Myelomalacia (H) 2/2011     NECK PAIN, HX CERVICAL SURGERY 2/9/2004     Osteoporosis, unspecified 2/04     POSTLAMINECTOMY STATUS-CERV      Pulmonary hypertension (H) 6/21/2017     SCOLIOSIS      Strabismic amblyopia        PAST SURGICAL HISTORY:  Past Surgical History:   Procedure Laterality Date     C DEXA, BONE DENSITY, AXIAL SKEL  2/04    L1-L4 -4.9, L 4- 5.6,FemNk L-3.5,R-3.4, ProxiFArm: -4.4,DistForearm:  -5.1     C FLUOROSCOPIC GUIDE & LOCALIZATION, NEEDLE/CATHETER TIP, SPINE/PARASPINE DX/THERAPEUTIC INJECTION  5/05    lumbar     C LAMINECTOMY,>2 SGMT,CERVICAL  ~1996?     laminectomy C3-7 with laminoplasty     HC CT ABDOMEN WO&W CONTRAST  11/2006    Slight prominence/tortuosity pancreatic duct , 2 small retroperitoneal areas with increased density (stable)     HC MRI CERVICAL SPINE W/O CONTRAST       HC MRI LUMBAR SPINE W/O CONTRAST  10/04    L5-S1 mild-mod,L3-4/L4-5 mod. bilat foraminal stenosis , L3-4/ L4-5, mod- sev. 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   Social Needs     Financial resource strain: None     Food insecurity:     Worry: None     Inability: None     Transportation needs:     Medical: None     Non-medical: None   Tobacco Use     Smoking status: Never Smoker     Smokeless tobacco: Never Used   Substance and Sexual Activity     Alcohol use: No      Alcohol/week: 0.0 standard drinks     Drug use: No     Sexual activity: Never     Comment:    Lifestyle     Physical activity:     Days per week: None     Minutes per session: None     Stress: None   Relationships     Social connections:     Talks on phone: None     Gets together: None     Attends Scientologist service: None     Active member of club or organization: None     Attends meetings of clubs or organizations: None     Relationship status: None     Intimate partner violence:     Fear of current or ex partner: None     Emotionally abused: None     Physically abused: None     Forced sexual activity: None   Other Topics Concern      Service No     Blood Transfusions No     Caffeine Concern Not Asked     Occupational Exposure Not Asked     Hobby Hazards Not Asked     Sleep Concern Not Asked     Stress Concern Not Asked     Weight Concern Not Asked     Special Diet Not Asked     Back Care Not Asked     Exercise Yes     Comment: WORKS , GOES TO PT     Bike Helmet Not Asked     Seat Belt Not Asked     Self-Exams Not Asked     Parent/sibling w/ CABG, MI or angioplasty before 65F 55M? Not Asked   Social History Narrative    SPEAKS NO ENGLISH    DAUGHTER ACCOMPANIES HER FOR APPTS     3 CHILDREN    LIVES WITH HER DAUGHTER                Review of Systems:  Skin:  Negative       Eyes:  Positive for glasses    ENT:  Negative      Respiratory:  Positive for dyspnea on exertion;cough     Cardiovascular:    Positive for;fatigue;lightheadedness;dizziness    Gastroenterology: Positive for heartburn;reflux    Genitourinary:  not assessed      Musculoskeletal:  Positive for arthritis;joint pain;joint stiffness    Neurologic:  Positive for headaches    Psychiatric:  Negative      Heme/Lymph/Imm:  Positive for allergies    Endocrine:  Negative        Physical Exam:  Vitals: /60   Pulse 66   Ht 1.524 m (5')   Wt 54.4 kg (120 lb)   BMI 23.44 kg/m      Constitutional:  cooperative, alert and oriented, well  developed, well nourished, in no acute distress        Skin:  warm and dry to the touch, no apparent skin lesions or masses noted          Head:  normocephalic, no masses or lesions        Eyes:  sclera white;no xanthalasma;EOMS intact;conjunctivae and lids unremarkable   right eye damaged/traumatized    Lymph:      ENT:  no pallor or cyanosis, dentition good        Neck:  carotid pulses are full and equal bilaterally, JVP normal, no carotid bruit        Respiratory:  normal breath sounds, clear to auscultation, normal A-P diameter, normal symmetry, normal respiratory excursion, no use of accessory muscles         Cardiac: regular rhythm;normal S1 and S2       systolic murmur;grade 1 systolic ejection murmur;LLSB;grade 2;radiation to the RUSB      pulses full and equal, no bruits auscultated                                        GI:  abdomen soft, non-tender, BS normoactive, no mass, no HSM, no bruits        Extremities and Muscular Skeletal:  no deformities, clubbing, cyanosis, erythema observed;no edema              Neurological:  no gross motor deficits;affect appropriate   walks with a cane    Psych:  Alert and Oriented x 3        CC  No referring provider defined for this encounter.

## 2020-01-21 NOTE — LETTER
[place] [address] [facility]  [address]  [place]  [address]  Tel: [phone]    January 27, 2020    Wilma Gallegos [facility]  [address]  [place] [address]        Simon Dillon,     I had the opportunity to review your recent labs and a summary of your labs reads as follows:     Your complete blood counts show improved hemoglobin, normal white blood cell count and platelets.   Your comprehensive metabolic panel showed normal renal function, normal liver function, and normal fasting blood glucose indicating no evidence of diabetes mellitus.   Your fasting lipid panel show   - normal HDL (good) cholesterol -as your goal is greater than 40   - low LDL (bad) cholesterol as your goal is less than 130   - normal triglyceride levels       If you have any further questions or problems, please contact our office.    Sincerely,   Lavelle Aj MD                                     Enclosure: Lab Results  Results for orders placed or performed in visit on 01/21/20   CBC with platelets     Status: Abnormal   Result Value Ref Range    WBC 4.2 4.0 - 11.0 10e9/L    RBC Count 3.72 (L) 3.8 - 5.2 10e12/L    Hemoglobin 11.7 11.7 - 15.7 g/dL    Hematocrit 36.9 35.0 - 47.0 %    MCV 99 78 - 100 fl    MCH 31.5 26.5 - 33.0 pg    MCHC 31.7 31.5 - 36.5 g/dL    RDW 13.8 10.0 - 15.0 %    Platelet Count 173 150 - 450 10e9/L   Lipid panel reflex to direct LDL Fasting     Status: Abnormal   Result Value Ref Range    Cholesterol 206 (H) <200 mg/dL    Triglycerides 97 <150 mg/dL    HDL Cholesterol 61 >49 mg/dL    LDL Cholesterol Calculated 126 (H) <100 mg/dL    Non HDL Cholesterol 145 (H) <130 mg/dL   Comprehensive metabolic panel     Status: Abnormal   Result Value Ref Range    Sodium 141 133 - 144 mmol/L    Potassium 4.9 3.4 - 5.3 mmol/L    Chloride 110 (H) 94 - 109 mmol/L    Carbon Dioxide 27 20 - 32 mmol/L    Anion Gap 4 3 - 14 mmol/L    Glucose 87 70 - 99 mg/dL    Urea Nitrogen 20 7 - 30 mg/dL    Creatinine 0.55 0.52  - 1.04 mg/dL    GFR Estimate >90 >60 mL/min/[1.73_m2]    GFR Estimate If Black >90 >60 mL/min/[1.73_m2]    Calcium 8.8 8.5 - 10.1 mg/dL    Bilirubin Total 0.2 0.2 - 1.3 mg/dL    Albumin 3.6 3.4 - 5.0 g/dL    Protein Total 7.3 6.8 - 8.8 g/dL    Alkaline Phosphatase 77 40 - 150 U/L    ALT 16 0 - 50 U/L    AST 20 0 - 45 U/L

## 2020-01-21 NOTE — LETTER
1/21/2020      Lavelle Aj MD, MD  [address] [facility] [address]  [facility] [address]      RE: Wilma Lorenzana       Dear Colleague,    I had the pleasure of seeing Wilma Lorenzana in the [facility].    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  The patient was previously seen 09/14/2017 by my associate, Dr. Lavelle Carrasco.  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  The patient has a possible rheumatic heart disease with previous mild mitral stenosis, mild mitral regurgitation and moderate pulmonary hypertension.  She gives a history of also having asthma.  She is a lifelong nonsmoker.  Previous nuclear stress test was abnormal, but with minimal symptoms suggesting ischemia, a repeat stress test was then normal.  Echocardiogram in 2017 showed a worsening transmitral gradient from 6-7 mmHg to 10 mmHg.  The PA pressures were normal at RVSP of 26 mmHg.  The patient has a history of mild aortic stenosis with a previous mean gradient of 13.4 mmHg.  The patient apparently had mild pulmonic insufficiency at that time.  She denies PND, orthopnea or peripheral edema.  She denies a history of rheumatic fever in her youth.  She notes some dyspnea on exertion which has not changed recently.  She also describes some orthostatic shortness of breath.  She has some body aches and possibly some arthritis.  She has had previous cervical neck fusion.  She has had hypertension for the last 5 years.  She denies previous myocardial infarction or stroke.  No diabetes mellitus.  No known hyperlipidemia.  The patient states over the last 3 months, she has had some palpitations, but denies syncope or presyncope.  She has a history of  GERD and some iron deficiency anemia.  Her daughter states she wanted her mother to reestablish care with a new cardiologist given the previous echocardiogram.      The patient's hypertension is well-treated with lisinopril and metoprolol.      PHYSICAL EXAMINATION:  As listed below.  Of note, the patient does not have any jugular venous distention.  She did not have any arrhythmias.  I do not hear any significant diastolic heart murmurs.      ASSESSMENT/PLAN:   1.  Wilma Lorenzana is a pleasant 79-year-old Thai female who presents for further evaluation of mitral stenosis.  It is hard to know how much of her shortness of breath is related to pulmonary hypertension and mitral stenosis versus underlying asthma.  She does not have any wheezing or rales on physical exam today.  She does not show any neck vein distention to suggest significant pulmonary hypertension.  I think it is reasonable to proceed with an echocardiogram to assess transmitral gradient.  If this has not progressed beyond 2017, we can wait to perform another echocardiogram for 1-2 years.  If this has progressed, we may need to consider mitral valve replacement.  The patient's symptoms are mild currently.   2.  Because of her mitral stenosis and palpitations, I think it is wise to rule out any atrial tachyarrhythmias such as atrial fibrillation or flutter.  To this end, we will place a Zio Patch for 7 days and then analyze the results.      It is my pleasure to assist in the care of Wilma Lorenzana.  She is a very kind and gentle lady.  Her daughter was most helpful today.  The  was also quite well-educated and helpful with the medical care today.  After reviewing the echocardiogram and the Zio Patch in the next 2 weeks, we will contact this patient and daughter for further evaluation and treatment options.      All her questions were answered to her satisfaction.  We will see her in our office no later than 6 months from now.  She will call  for increased shortness of breath.      Sean Lopez MD      cc:   Lavelle Aj MD   [facility]   [address]   [place] [address]         SEAN LOPEZ MD, [facility]             D: 2020   T: 2020   MT: al      Name:     ABHINAV ARMSTRONG   MRN:      [id number]        Account:      [id number]   :      1940           Service Date: 2020      Document: [id number]         Outpatient Encounter Medications as of 2020   Medication Sig Dispense Refill     albuterol (PROVENTIL) (2.5 MG/3ML) 0.083% neb solution Take 2.5 mg by nebulization every 6 hours as needed for shortness of breath / dyspnea or wheezing       Cholecalciferol (VITAMIN D) 2000 units tablet Take 2,000 Units by mouth daily 100 tablet 3     ferrous sulfate (IRON) 325 (65 Fe) MG tablet Take 1 tablet (325 mg) by mouth daily (with breakfast) 90 tablet 3     multivitamin, therapeutic with minerals (THERA-VIT-M) TABS Take 1 tablet by mouth daily       senna-docusate (SENOKOT-S;PERICOLACE) 8.6-50 MG per tablet Take 1 tablet by mouth 2 times daily 60 tablet 11     traMADol (ULTRAM) 50 MG tablet TAKE ONE TABLET BY MOUTH EVERY 6 HOURS AS NEEDED FOR SEVERE PAIN 30 tablet 0     [DISCONTINUED] albuterol (VENTOLIN HFA) 108 (90 Base) MCG/ACT inhaler Inhale 2 puffs into the lungs every 4 hours as needed for shortness of breath / dyspnea or wheezing 18 g 11     [DISCONTINUED] fluticasone-salmeterol (ADVAIR DISKUS) 250-50 MCG/DOSE diskus inhaler INHALE 1 PUFF BY MOUTH EVERY 12 HOURS, IN THE MORNING AND IN THE EVENING, ABOUT 12 HOURS APART 3 Inhaler 3     [DISCONTINUED] IBANdronate (BONIVA) 150 MG tablet TAKE 1 TABLET BY MOUTH EVERY 30 DAYS 1 HR BEFORE BREAKFAST WITH 8OZ OF WATER AND SIT UPRIGHT 30 MIN 3 tablet 3     [DISCONTINUED] lisinopril (PRINIVIL/ZESTRIL) 5 MG tablet TAKE 1 TABLET BY MOUTH EVERY DAY 90 tablet 0     [DISCONTINUED] metoprolol succinate ER (TOPROL-XL) 50 MG 24 hr tablet TAKE ONE TABLET BY MOUTH  DAILY 90 tablet 0     [DISCONTINUED] omeprazole (PRILOSEC) 20 MG CR capsule TAKE 1 CAPSULE (20 MG) BY MOUTH DAILY 90 capsule 3     order for DME Equipment being ordered: Spacer for inhaler 1 each 0     order for DME Equipment being ordered: Peak Flow Meter 1 each 0     order for DME Equipment being ordered:     Ensure nutritional supplement - 1/day 30 each 11     order for DME Equipment being ordered: TENS 1 each 0     ORDER FOR DME 1 blood pressure cuff, automatic digital reading for home use 1 Device 0     [DISCONTINUED] diclofenac (VOLTAREN) 1 % topical gel Place onto the skin 4 times daily USE FOR KNEES 100 g 11     No facility-administered encounter medications on file as of 1/21/2020.        Again, thank you for allowing me to participate in the care of your patient.      Sincerely,    Hermilo Holm MD     [facility]

## 2020-01-21 NOTE — PATIENT INSTRUCTIONS
(J45.20) Intermittent asthma without complication, unspecified asthma severity  (primary encounter diagnosis)  Comment: We will continue with current medications of inhalers  Plan:     (J44.9) COPD, moderate (H)  Comment: We will continue with Advair and albuterol   Plan:     (I35.9) Aortic valve disorder  Comment: Plan to follow up in cardiology   Plan:     (I05.0) Mitral valve stenosis, unspecified etiology  Comment: plan to continue follow up in cardiology as above   Plan:     (I27.20) Pulmonary hypertension (H)  Comment: Noted that upcoimng echocardiogram is planned  Plan:     (R00.2) Palpitations  Comment: Plan for cardiac monitoring   Plan:     (I10) Essential hypertension  Comment: blood pressure is well controlled - continue to monitor sodium intake - less than 2,000 mg/day  Plan: metoprolol succinate ER (TOPROL-XL) 50 MG 24 hr        tablet, lisinopril (PRINIVIL/ZESTRIL) 5 MG         tablet, CBC with platelets, Comprehensive         metabolic panel            (G89.4) Chronic pain syndrome  Comment: We will try another course of Voltaren gel  Plan: diclofenac (VOLTAREN) 1 % topical gel            (M81.0) Senile osteoporosis  Comment: Recommend restarting Boniva once per month to help with bone density  Plan: IBANdronate (BONIVA) 150 MG tablet            (K21.9) Gastroesophageal reflux disease without esophagitis  Comment:   Plan: omeprazole (PRILOSEC) 20 MG DR capsule            (J45.30) Mild persistent asthma without complication  Comment:   Plan: fluticasone-salmeterol (ADVAIR DISKUS) 250-50         MCG/DOSE inhaler, albuterol (VENTOLIN HFA) 108         (90 Base) MCG/ACT inhaler            (Z13.6) CARDIOVASCULAR SCREENING; LDL GOAL LESS THAN 160  Comment:   Plan: Lipid panel reflex to direct LDL Fasting             Shingrix vaccine is now available.  I would call your insurance to see if a shingles vaccine is covered and get this at your pharmacy

## 2020-01-21 NOTE — LETTER
1/21/2020    Lavelle Aj MD, MD  [address]  [facility] [address]    RE: Wilma Lorenzana       Dear Colleague,    I had the pleasure of seeing Wilma Tsangmirta in the [facility].    HPI and Plan:   See dictation:[id number]    Orders Placed This Encounter   Procedures     Follow-Up with Cardiac Advanced Practice Provider     Zio Patch Holter Adult Pediatric Greater than 48 hrs     Echocardiogram Complete       No orders of the defined types were placed in this encounter.      There are no discontinued medications.      Encounter Diagnoses   Name Primary?     Mitral valve stenosis, unspecified etiology Yes     Mitral valve insufficiency, unspecified etiology      Pulmonary hypertension (H)      Aortic valve disorder      Palpitations      Essential hypertension        CURRENT MEDICATIONS:  Current Outpatient Medications   Medication Sig Dispense Refill     albuterol (PROVENTIL) (2.5 MG/3ML) 0.083% neb solution Take 2.5 mg by nebulization every 6 hours as needed for shortness of breath / dyspnea or wheezing       albuterol (VENTOLIN HFA) 108 (90 Base) MCG/ACT inhaler Inhale 2 puffs into the lungs every 4 hours as needed for shortness of breath / dyspnea or wheezing 18 g 11     Cholecalciferol (VITAMIN D) 2000 units tablet Take 2,000 Units by mouth daily 100 tablet 3     ferrous sulfate (IRON) 325 (65 Fe) MG tablet Take 1 tablet (325 mg) by mouth daily (with breakfast) 90 tablet 3     fluticasone-salmeterol (ADVAIR DISKUS) 250-50 MCG/DOSE diskus inhaler INHALE 1 PUFF BY MOUTH EVERY 12 HOURS, IN THE MORNING AND IN THE EVENING, ABOUT 12 HOURS APART 3 Inhaler 3     IBANdronate (BONIVA) 150 MG tablet TAKE 1 TABLET BY MOUTH EVERY 30 DAYS 1 HR BEFORE BREAKFAST WITH 8OZ OF WATER AND SIT UPRIGHT 30 MIN 3 tablet 3     lisinopril (PRINIVIL/ZESTRIL) 5 MG tablet TAKE 1 TABLET BY MOUTH EVERY DAY 90 tablet 0     metoprolol succinate ER (TOPROL-XL) 50 MG 24 hr tablet TAKE ONE  TABLET BY MOUTH DAILY 90 tablet 0     multivitamin, therapeutic with minerals (THERA-VIT-M) TABS Take 1 tablet by mouth daily       omeprazole (PRILOSEC) 20 MG CR capsule TAKE 1 CAPSULE (20 MG) BY MOUTH DAILY 90 capsule 3     senna-docusate (SENOKOT-S;PERICOLACE) 8.6-50 MG per tablet Take 1 tablet by mouth 2 times daily 60 tablet 11     traMADol (ULTRAM) 50 MG tablet TAKE ONE TABLET BY MOUTH EVERY 6 HOURS AS NEEDED FOR SEVERE PAIN 30 tablet 0     diclofenac (VOLTAREN) 1 % topical gel Place onto the skin 4 times daily USE FOR KNEES 100 g 11     order for DME Equipment being ordered: Spacer for inhaler 1 each 0     order for DME Equipment being ordered: Peak Flow Meter 1 each 0     order for DME Equipment being ordered:     Ensure nutritional supplement - 1/day 30 each 11     order for DME Equipment being ordered: TENS 1 each 0     ORDER FOR DME 1 blood pressure cuff, automatic digital reading for home use 1 Device 0       ALLERGIES     Allergies   Allergen Reactions     Alendronate Sodium      Worse acid reflux     Food      Meat, eggs, dairy       PAST MEDICAL HISTORY:  Past Medical History:   Diagnosis Date     ABNORMAL ECHO 5/04 5/30/2004    Mild-mod MV Ca++, Mild MR, Mild-Mod TR, Mild-Mod VT, Mild-Mod PHTN, Mod AV sclerosis, Mild EAD L>R, EF normal 70%      Aortic valve disorder 5/30/2004    Echo, rec repeat 2 yrs  Problem list name updated by automated process. Provider to review     AORTIC VALVE SCLEROSIS 5/30/2004    needs ABX prophylaxis     Chronic pain syndrome 3/16/2018    Patient is followed by Lavelle Aj MD, MD for ongoing prescription of pain medication.  All refills should only be approved by this provider, or covering partner.  Medication(s): Tramadol.  Maximum quantity per month: 60 Clinic visit frequency required: Q 6  months   Controlled substance agreement: Encounter-Level CSA:   There are no encounter-level csa.   Pain Clinic evaluation in the     COMPRESSION FRX T5      COPD,  moderate (H) 11/1/2015     Degeneration of lumbar or lumbosacral intervertebral disc 11/04    L5-S1 mild-mod,L3-4/L4-5 mod. bilat foraminal stenosis , L3-4/ L4-5, mod- sev. cent stenosis     Esophageal reflux 8/9/2007     HELICOBACTER PYLORI INFECTION 11/1/2005     HTN (hypertension) 6/7/2013     Iron deficiency anemia, unspecified iron deficiency anemia type 6/1/2018     JOINT PAIN BILATERAL THUMBS 2/9/2004     L SHOULDER PAIN 2/9/2004     Mild persistent asthma      Mitral valve insufficiency, unspecified etiology 6/21/2017     Myelomalacia (H) 2/2011     NECK PAIN, HX CERVICAL SURGERY 2/9/2004     Osteoporosis, unspecified 2/04     POSTLAMINECTOMY STATUS-CERV      Pulmonary hypertension (H) 6/21/2017     SCOLIOSIS      Strabismic amblyopia        PAST SURGICAL HISTORY:  Past Surgical History:   Procedure Laterality Date     C DEXA, BONE DENSITY, AXIAL SKEL  2/04    L1-L4 -4.9, L 4- 5.6,FemNk L-3.5,R-3.4, ProxiFArm: -4.4,DistForearm:  -5.1     C FLUOROSCOPIC GUIDE & LOCALIZATION, NEEDLE/CATHETER TIP, SPINE/PARASPINE DX/THERAPEUTIC INJECTION  5/05    lumbar     C LAMINECTOMY,>2 SGMT,CERVICAL  ~1996?     laminectomy C3-7 with laminoplasty     HC CT ABDOMEN WO&W CONTRAST  11/2006    Slight prominence/tortuosity pancreatic duct , 2 small retroperitoneal areas with increased density (stable)     HC MRI CERVICAL SPINE W/O CONTRAST       HC MRI LUMBAR SPINE W/O CONTRAST  10/04    L5-S1 mild-mod,L3-4/L4-5 mod. bilat foraminal stenosis , L3-4/ L4-5, mod- sev. 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   Social Needs     Financial resource strain: None     Food insecurity:      Worry: None     Inability: None     Transportation needs:     Medical: None     Non-medical: None   Tobacco Use     Smoking status: Never Smoker     Smokeless tobacco: Never Used   Substance and Sexual Activity     Alcohol use: No     Alcohol/week: 0.0 standard drinks     Drug use: No     Sexual activity: Never     Comment:    Lifestyle     Physical activity:     Days per week: None     Minutes per session: None     Stress: None   Relationships     Social connections:     Talks on phone: None     Gets together: None     Attends Caodaism service: None     Active member of club or organization: None     Attends meetings of clubs or organizations: None     Relationship status: None     Intimate partner violence:     Fear of current or ex partner: None     Emotionally abused: None     Physically abused: None     Forced sexual activity: None   Other Topics Concern      Service No     Blood Transfusions No     Caffeine Concern Not Asked     Occupational Exposure Not Asked     Hobby Hazards Not Asked     Sleep Concern Not Asked     Stress Concern Not Asked     Weight Concern Not Asked     Special Diet Not Asked     Back Care Not Asked     Exercise Yes     Comment: WORKS , GOES TO PT     Bike Helmet Not Asked     Seat Belt Not Asked     Self-Exams Not Asked     Parent/sibling w/ CABG, MI or angioplasty before 65F 55M? Not Asked   Social History Narrative    SPEAKS NO ENGLISH    DAUGHTER ACCOMPANIES HER FOR APPTS     3 CHILDREN    LIVES WITH HER DAUGHTER                Review of Systems:  Skin:  Negative       Eyes:  Positive for glasses    ENT:  Negative      Respiratory:  Positive for dyspnea on exertion;cough     Cardiovascular:    Positive for;fatigue;lightheadedness;dizziness    Gastroenterology: Positive for heartburn;reflux    Genitourinary:  not assessed      Musculoskeletal:  Positive for arthritis;joint pain;joint stiffness    Neurologic:  Positive for headaches    Psychiatric:  Negative       Heme/Lymph/Imm:  Positive for allergies    Endocrine:  Negative        Physical Exam:  Vitals: /60   Pulse 66   Ht 1.524 m (5')   Wt 54.4 kg (120 lb)   BMI 23.44 kg/m       Constitutional:  cooperative, alert and oriented, well developed, well nourished, in no acute distress        Skin:  warm and dry to the touch, no apparent skin lesions or masses noted          Head:  normocephalic, no masses or lesions        Eyes:  sclera white;no xanthalasma;EOMS intact;conjunctivae and lids unremarkable   right eye damaged/traumatized    Lymph:      ENT:  no pallor or cyanosis, dentition good        Neck:  carotid pulses are full and equal bilaterally, JVP normal, no carotid bruit        Respiratory:  normal breath sounds, clear to auscultation, normal A-P diameter, normal symmetry, normal respiratory excursion, no use of accessory muscles         Cardiac: regular rhythm;normal S1 and S2       systolic murmur;grade 1 systolic ejection murmur;LLSB;grade 2;radiation to the RUSB      pulses full and equal, no bruits auscultated                                        GI:  abdomen soft, non-tender, BS normoactive, no mass, no HSM, no bruits        Extremities and Muscular Skeletal:  no deformities, clubbing, cyanosis, erythema observed;no edema              Neurological:  no gross motor deficits;affect appropriate   walks with a cane    Psych:  Alert and Oriented x 3        CC  No referring provider defined for this encounter.                Thank you for allowing me to participate in the care of your patient.      Sincerely,     Hermilo Holm MD     [facility]    cc:   No referring provider defined for this encounter.

## 2020-01-21 NOTE — PROGRESS NOTES
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  The patient was previously seen 09/14/2017 by my associate, Dr. Lavelle Carrasco.  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  The patient has a possible rheumatic heart disease with previous mild mitral stenosis, mild mitral regurgitation and moderate pulmonary hypertension.  She gives a history of also having asthma.  She is a lifelong nonsmoker.  Previous nuclear stress test was abnormal, but with minimal symptoms suggesting ischemia, a repeat stress test was then normal.  Echocardiogram in 2017 showed a worsening transmitral gradient from 6-7 mmHg to 10 mmHg.  The PA pressures were normal at RVSP of 26 mmHg.  The patient has a history of mild aortic stenosis with a previous mean gradient of 13.4 mmHg.  The patient apparently had mild pulmonic insufficiency at that time.  She denies PND, orthopnea or peripheral edema.  She denies a history of rheumatic fever in her youth.  She notes some dyspnea on exertion which has not changed recently.  She also describes some orthostatic shortness of breath.  She has some body aches and possibly some arthritis.  She has had previous cervical neck fusion.  She has had hypertension for the last 5 years.  She denies previous myocardial infarction or stroke.  No diabetes mellitus.  No known hyperlipidemia.  The patient states over the last 3 months, she has had some palpitations, but denies syncope or presyncope.  She has a history of GERD and some iron deficiency anemia.  Her daughter states she wanted her mother to reestablish care with a new cardiologist given the previous echocardiogram.      The patient's hypertension is well-treated with lisinopril and metoprolol.       PHYSICAL EXAMINATION:  As listed below.  Of note, the patient does not have any jugular venous distention.  She did not have any arrhythmias.  I do not hear any significant diastolic heart murmurs.      ASSESSMENT/PLAN:   1.  Wilma Lorenzana is a pleasant 79-year-old Surinamese female who presents for further evaluation of mitral stenosis.  It is hard to know how much of her shortness of breath is related to pulmonary hypertension and mitral stenosis versus underlying asthma.  She does not have any wheezing or rales on physical exam today.  She does not show any neck vein distention to suggest significant pulmonary hypertension.  I think it is reasonable to proceed with an echocardiogram to assess transmitral gradient.  If this has not progressed beyond 2017, we can wait to perform another echocardiogram for 1-2 years.  If this has progressed, we may need to consider mitral valve replacement.  The patient's symptoms are mild currently.   2.  Because of her mitral stenosis and palpitations, I think it is wise to rule out any atrial tachyarrhythmias such as atrial fibrillation or flutter.  To this end, we will place a Zio Patch for 7 days and then analyze the results.      It is my pleasure to assist in the care of Wilma Lorenzana.  She is a very kind and gentle lady.  Her daughter was most helpful today.  The  was also quite well-educated and helpful with the medical care today.  After reviewing the echocardiogram and the Zio Patch in the next 2 weeks, we will contact this patient and daughter for further evaluation and treatment options.      All her questions were answered to her satisfaction.  We will see her in our office no later than 6 months from now.  She will call for increased shortness of breath.      Sean Lopez MD      cc:   Lavelle Aj MD   [facility]   [address]   [place] [address]         SE[facility] LOPEZ MD, [facility]             D: 01/21/2020   T: 01/21/2020    MT: al      Name:     ABHINAV ARMSTRONG   MRN:      [id number]        Account:      [id number]   :      1940           Service Date: 2020      Document: [id number]

## 2020-01-21 NOTE — PROGRESS NOTES
[facility]  CLINIC PROGRESS NOTE    Subjective:   COPD, moderate (H)  Intermittent asthma without complication, unspecified asthma severity   Wilma Lorenzana presents to the clinic today for follow up of her underlying respiratory condition.   She has been using her inhalers regularly and feels that symptoms are well controlled  Aortic valve disorder  Mitral valve stenosis, unspecified etiology  Pulmonary hypertension (H)  Palpitations   She was given a prescription for a third blood pressure medication to help blood pressure as her blood pressure was reportedly elevated.  She is no longer taking this third blood pressure medication however and does not feel that it is needed.  She does have follow up in cardiology planned.    Past medical history, medications, allergies, social history, family history reviewed and updated in Psychiatric as of 1/21/2020 .    ROS  CONSTITUTIONAL: no fatigue, no unexpected change in weight  SKIN: no worrisome rashes, no worrisome moles, no worrisome lesions  EYES: no acute vision problems or changes  ENT: no ear problems, no mouth problems, no throat problems  RESP: as above, no significant cough, no shortness of breath  CV: no chest pain, no palpitations,  GI: no nausea, no vomiting, no constipation, no diarrhea  : no frequency, no dysuria, no hematuria  MS: chronic pain in her hands and upper extremities - she did not take diclofenac gel as was prescribed previously  PSYCHIATRIC: no changes in mood or affect      Objective:  Vitals  /61 (BP Location: Left arm, Patient Position: Sitting, Cuff Size: Adult Regular)   Pulse 79   Temp 97.2  F (36.2  C) (Oral)   Ht 1.524 m (5')   Wt 54.7 kg (120 lb 9.6 oz)   SpO2 99%   BMI 23.55 kg/m    GEN: Alert Oriented x3 NAD  HEENT: Atraumatic, normocephalic, neck supple, no thyromegaly, negative cervical adenopathy  TM: TM bilaterally pearly and grey with normal light reflex  CV: RRR no murmurs or rubs  PULM: CTA no wheezes or  crackles  ABD: Soft, nontender nondistended, no hepatosplenomegally  SKIN: No visible skin lesion or ulcerations  EXT:   no edema bilateral lower extremities  NEURO: Gait and station deferred, No focal neurologic deficits  PSYCH: Mood good, affect mood congruent    No images are attached to the encounter.    No results found for this or any previous visit (from the past 24 hour(s)).    Assessment/Plan:  Patient Instructions   (J45.20) Intermittent asthma without complication, unspecified asthma severity  (primary encounter diagnosis)  Comment: We will continue with current medications of inhalers  Plan:     (J44.9) COPD, moderate (H)  Comment: We will continue with Advair and albuterol   Plan:     (I35.9) Aortic valve disorder  Comment: Plan to follow up in cardiology   Plan:     (I05.0) Mitral valve stenosis, unspecified etiology  Comment: plan to continue follow up in cardiology as above   Plan:     (I27.20) Pulmonary hypertension (H)  Comment: Noted that upcoimng echocardiogram is planned  Plan:     (R00.2) Palpitations  Comment: Plan for cardiac monitoring   Plan:     (I10) Essential hypertension  Comment: blood pressure is well controlled - continue to monitor sodium intake - less than 2,000 mg/day  Plan: metoprolol succinate ER (TOPROL-XL) 50 MG 24 hr        tablet, lisinopril (PRINIVIL/ZESTRIL) 5 MG         tablet, CBC with platelets, Comprehensive         metabolic panel            (G89.4) Chronic pain syndrome  Comment: We will try another course of Voltaren gel  Plan: diclofenac (VOLTAREN) 1 % topical gel            (M81.0) Senile osteoporosis  Comment: Recommend restarting Boniva once per month to help with bone density  Plan: IBANdronate (BONIVA) 150 MG tablet            (K21.9) Gastroesophageal reflux disease without esophagitis  Comment:   Plan: omeprazole (PRILOSEC) 20 MG DR capsule            (J45.30) Mild persistent asthma without complication  Comment:   Plan: fluticasone-salmeterol (ADVAIR DISKUS)  250-50         MCG/DOSE inhaler, albuterol (VENTOLIN HFA) 108         (90 Base) MCG/ACT inhaler            (Z13.6) CARDIOVASCULAR SCREENING; LDL GOAL LESS THAN 160  Comment:   Plan: Lipid panel reflex to direct LDL Fasting             Shingrix vaccine is now available.  I would call your insurance to see if a shingles vaccine is covered and get this at your pharmacy      Follow up in 6 months    Disclaimer: This note consists of symbols derived from keyboarding, dictation and/or voice recognition software. As a result, there may be errors in the script that have gone undetected. Please consider this when interpreting information found in this chart.    Lavelle Aj MD  [phone]

## 2020-01-22 LAB
ALBUMIN SERPL-MCNC: 3.6 G/DL (ref 3.4–5)
ALP SERPL-CCNC: 77 U/L (ref 40–150)
ALT SERPL W P-5'-P-CCNC: 16 U/L (ref 0–50)
ANION GAP SERPL CALCULATED.3IONS-SCNC: 4 MMOL/L (ref 3–14)
AST SERPL W P-5'-P-CCNC: 20 U/L (ref 0–45)
BILIRUB SERPL-MCNC: 0.2 MG/DL (ref 0.2–1.3)
BUN SERPL-MCNC: 20 MG/DL (ref 7–30)
CALCIUM SERPL-MCNC: 8.8 MG/DL (ref 8.5–10.1)
CHLORIDE SERPL-SCNC: 110 MMOL/L (ref 94–109)
CHOLEST SERPL-MCNC: 206 MG/DL
CO2 SERPL-SCNC: 27 MMOL/L (ref 20–32)
CREAT SERPL-MCNC: 0.55 MG/DL (ref 0.52–1.04)
GFR SERPL CREATININE-BSD FRML MDRD: >90 ML/MIN/{1.73_M2}
GLUCOSE SERPL-MCNC: 87 MG/DL (ref 70–99)
HDLC SERPL-MCNC: 61 MG/DL
LDLC SERPL CALC-MCNC: 126 MG/DL
NONHDLC SERPL-MCNC: 145 MG/DL
POTASSIUM SERPL-SCNC: 4.9 MMOL/L (ref 3.4–5.3)
PROT SERPL-MCNC: 7.3 G/DL (ref 6.8–8.8)
SODIUM SERPL-SCNC: 141 MMOL/L (ref 133–144)
TRIGL SERPL-MCNC: 97 MG/DL

## 2020-01-26 NOTE — RESULT ENCOUNTER NOTE
Hi Awetash,    I had the opportunity to review your recent labs and a summary of your labs reads as follows:    Your complete blood counts show improved hemoglobin, normal white blood cell count and platelets.  Your comprehensive metabolic panel showed normal renal function, normal liver function, and normal fasting blood glucose indicating no evidence of diabetes mellitus.  Your fasting lipid panel show  - normal HDL (good) cholesterol -as your goal is greater than 40  - low LDL (bad) cholesterol as your goal is less than 130  - normal triglyceride levels      Sincerely,  Lavelle Aj MD

## 2020-01-31 ENCOUNTER — PATIENT OUTREACH (OUTPATIENT)
Dept: GERIATRIC MEDICINE | Facility: CLINIC | Age: 80
End: 2020-01-31

## 2020-02-04 ENCOUNTER — HOSPITAL ENCOUNTER (OUTPATIENT)
Dept: CARDIOLOGY | Facility: CLINIC | Age: 80
Discharge: HOME | End: 2020-02-04
Attending: INTERNAL MEDICINE | Admitting: INTERNAL MEDICINE
Payer: COMMERCIAL

## 2020-02-04 DIAGNOSIS — I35.9 AORTIC VALVE DISORDER: ICD-10-CM

## 2020-02-04 DIAGNOSIS — R00.2 PALPITATIONS: ICD-10-CM

## 2020-02-04 DIAGNOSIS — I34.0 MITRAL VALVE INSUFFICIENCY, UNSPECIFIED ETIOLOGY: ICD-10-CM

## 2020-02-04 DIAGNOSIS — I05.0 MITRAL VALVE STENOSIS, UNSPECIFIED ETIOLOGY: ICD-10-CM

## 2020-02-04 PROCEDURE — 0296T ZIO PATCH HOLTER ADULT PEDIATRIC GREATER THAN 48 HRS: CPT

## 2020-02-04 PROCEDURE — 93306 TTE W/DOPPLER COMPLETE: CPT

## 2020-02-04 PROCEDURE — 0298T ZIO PATCH HOLTER ADULT PEDIATRIC GREATER THAN 48 HRS: CPT | Performed by: INTERNAL MEDICINE

## 2020-02-04 PROCEDURE — 93306 TTE W/DOPPLER COMPLETE: CPT | Mod: 26 | Performed by: INTERNAL MEDICINE

## 2020-02-25 ENCOUNTER — TELEPHONE (OUTPATIENT)
Dept: CARDIOLOGY | Facility: CLINIC | Age: 80
End: 2020-02-25

## 2020-02-25 NOTE — TELEPHONE ENCOUNTER
Feli, MD Yaw Tristan Ashley, RN; P [facility] Heart Team 4             Monitor reviewed. Brief SVT but no atrial fibrillation or flutter (the main reason for the monitor as those rhythms can be associated with mitral stenosis and have high stroke risk if found).     Echo reviewed also and overall stable.      Contacted patient's daughter to review results and Dr. Edmond's comments. Patient's daughter did not answer. Left message for patient's daughter to call back.

## 2020-03-09 DIAGNOSIS — M51.379 DEGENERATION OF LUMBAR OR LUMBOSACRAL INTERVERTEBRAL DISC: ICD-10-CM

## 2020-03-09 DIAGNOSIS — M54.2 CERVICALGIA: ICD-10-CM

## 2020-03-09 DIAGNOSIS — J45.30 MILD PERSISTENT ASTHMA WITHOUT COMPLICATION: ICD-10-CM

## 2020-03-10 NOTE — TELEPHONE ENCOUNTER
JOSE ANTONIO from patient's daughter returning call. Spoke with patient's daughter regarding patient's results and Dr. Edmond's comments. Patient's daughter verbalized understanding and agreed with plan of care.

## 2020-03-10 NOTE — TELEPHONE ENCOUNTER
Controlled Substance Refill Request for     traMADol (ULTRAM) 50 MG tablet  30 tablet  0  1/20/2020   No    Sig: TAKE ONE TABLET BY MOUTH EVERY 6 HOURS AS NEEDED FOR SEVERE PAIN      Last Written Prescription Date:  01/20/2020  Last Fill Quantity: 30,  # refills: 0   Last office visit: 1/21/2020 with prescribing provider:     Future Office Visit:      Problem List Complete:  Yes    Chronic pain syndrome   Problem Detail     Noted:  3/16/2018    Priority:  Medium    Overview Addendum 12/26/2018 12:26 PM by Mee Blanco RN    Patient is followed by Lavelle Aj MD, MD for ongoing prescription of pain medication.  All refills should only be approved by this provider, or covering partner.     Medication(s): Tramadol.   Maximum quantity per month: 60  Clinic visit frequency required: Q 6  months      Controlled substance agreement:      Encounter-Level CSA:      There are no encounter-level csa.          Pain Clinic evaluation in the past: No     DIRE Total Score(s):  No flowsheet data found.     Last [facility] website verification: 12/26/18 [place]    [email or web address]        checked in past 3 months?  No, route to RN

## 2020-03-10 NOTE — TELEPHONE ENCOUNTER
"Requested Prescriptions   Pending Prescriptions Disp Refills     albuterol (PROAIR HFA/PROVENTIL HFA/VENTOLIN HFA) 108 (90 Base) MCG/ACT inhaler [Pharmacy Med Name: ALBUTEROL HFA INH (200 PUFFS) 18GM] 18 g      Sig: INHALE 2 PUFFS BY MOUTH EVERY 4 HOURS AS NEEDED FOR SHORTNESS OF BREATH, DIFFICULTY BREATHING, OR WHEEZING  Last Written Prescription Date:  1/21/20  Last Fill Quantity: 54 g,  # refills: 3   Last office visit: 1/21/2020 with prescribing provider:  Madai   Future Office Visit:         Asthma Maintenance Inhalers - Anticholinergics Failed - 3/9/2020  5:21 PM        Failed - Asthma control assessment score within normal limits in last 6 months     Please review ACT score.   ACT Total Scores 6/7/2013 8/26/2014 5/22/2018   ACT TOTAL SCORE 13 13 -   ASTHMA ER VISITS 0 = None 0 = None -   ASTHMA HOSPITALIZATIONS 0 = None 0 = None -   ACT TOTAL SCORE (Goal Greater than or Equal to 20) - - 18   In the past 12 months, how many times did you visit the emergency room for your asthma without being admitted to the hospital? - - 0   In the past 12 months, how many times were you hospitalized overnight because of your asthma? - - 0           Passed - Patient is age 12 years or older        Passed - Medication is active on med list        Passed - Recent (6 mo) or future (30 days) visit within the authorizing provider's specialty     Patient had office visit in the last 6 months or has a visit in the next 30 days with authorizing provider or within the authorizing provider's specialty.  See \"Patient Info\" tab in inbasket, or \"Choose Columns\" in Meds & Orders section of the refill encounter.           Short-Acting Beta Agonist Inhalers Protocol  Failed - 3/9/2020  5:21 PM        Failed - Asthma control assessment score within normal limits in last 6 months     Please review ACT score.           Passed - Patient is age 12 or older        Passed - Medication is active on med list        Passed - Recent (6 mo) or future " "(30 days) visit within the authorizing provider's specialty     Patient had office visit in the last 6 months or has a visit in the next 30 days with authorizing provider or within the authorizing provider's specialty.  See \"Patient Info\" tab in inbasket, or \"Choose Columns\" in Meds & Orders section of the refill encounter.                "

## 2020-03-11 RX ORDER — ALBUTEROL SULFATE 90 UG/1
AEROSOL, METERED RESPIRATORY (INHALATION)
Start: 2020-03-11

## 2020-03-11 RX ORDER — TRAMADOL HYDROCHLORIDE 50 MG/1
TABLET ORAL
Qty: 30 TABLET | Refills: 0 | Status: SHIPPED | OUTPATIENT
Start: 2020-03-11 | End: 2020-04-28

## 2020-04-28 DIAGNOSIS — M51.379 DEGENERATION OF LUMBAR OR LUMBOSACRAL INTERVERTEBRAL DISC: ICD-10-CM

## 2020-04-28 DIAGNOSIS — M54.2 CERVICALGIA: ICD-10-CM

## 2020-04-29 NOTE — TELEPHONE ENCOUNTER
Controlled Substance Refill Request for Pending Prescriptions:                       Disp   Refills    traMADol (ULTRAM) 50 MG tablet            30 tab*0            Sig: TAKE 1 TABLET BY MOUTH EVERY 6 HOURS AS NEEDED           FOR SEVERE PAIN      Problem List Complete:  Yes   checked in past 3 months?  No, route to RN  Patient is followed by Lavelle Aj MD, MD for ongoing prescription of pain medication.  All refills should only be approved by this provider, or covering partner.     Medication(s): Tramadol.   Maximum quantity per month: 60  Clinic visit frequency required: Q 6  months      Controlled substance agreement:      Encounter-Level CSA:      There are no encounter-level csa.          Pain Clinic evaluation in the past: No     DIRE Total Score(s):  No flowsheet data found.     Last [facility] website verification: 12/26/18 [place]    [email or web address]

## 2020-04-30 RX ORDER — TRAMADOL HYDROCHLORIDE 50 MG/1
TABLET ORAL
Qty: 30 TABLET | Refills: 0 | Status: SHIPPED | OUTPATIENT
Start: 2020-04-30 | End: 2020-06-12

## 2020-04-30 NOTE — TELEPHONE ENCOUNTER
Routing refill request to provider for review/approval because:  Drug not on the [facility] refill protocol   Please authorize if appropriate.  Thanks,  Anjali Benavides RN

## 2020-05-04 DIAGNOSIS — M81.0 AGE-RELATED OSTEOPOROSIS WITHOUT CURRENT PATHOLOGICAL FRACTURE: ICD-10-CM

## 2020-05-04 DIAGNOSIS — D50.9 IRON DEFICIENCY ANEMIA, UNSPECIFIED IRON DEFICIENCY ANEMIA TYPE: ICD-10-CM

## 2020-05-04 DIAGNOSIS — R73.01 IFG (IMPAIRED FASTING GLUCOSE): ICD-10-CM

## 2020-05-04 DIAGNOSIS — J44.9 COPD, MODERATE (H): ICD-10-CM

## 2020-05-05 ENCOUNTER — VIRTUAL VISIT (OUTPATIENT)
Dept: FAMILY MEDICINE | Facility: CLINIC | Age: 80
End: 2020-05-05
Payer: COMMERCIAL

## 2020-05-05 DIAGNOSIS — R73.01 IFG (IMPAIRED FASTING GLUCOSE): ICD-10-CM

## 2020-05-05 DIAGNOSIS — M81.0 AGE-RELATED OSTEOPOROSIS WITHOUT CURRENT PATHOLOGICAL FRACTURE: ICD-10-CM

## 2020-05-05 DIAGNOSIS — J44.9 COPD, MODERATE (H): ICD-10-CM

## 2020-05-05 DIAGNOSIS — D50.9 IRON DEFICIENCY ANEMIA, UNSPECIFIED IRON DEFICIENCY ANEMIA TYPE: ICD-10-CM

## 2020-05-05 PROCEDURE — 99214 OFFICE O/P EST MOD 30 MIN: CPT | Mod: TEL | Performed by: INTERNAL MEDICINE

## 2020-05-05 RX ORDER — AMOXICILLIN 250 MG
1 CAPSULE ORAL 2 TIMES DAILY
Qty: 60 TABLET | Refills: 11 | Status: SHIPPED | OUTPATIENT
Start: 2020-05-05 | End: 2020-08-23

## 2020-05-05 RX ORDER — AMOXICILLIN 250 MG
1 CAPSULE ORAL 2 TIMES DAILY
Qty: 60 TABLET | Refills: 11 | Status: SHIPPED | OUTPATIENT
Start: 2020-05-05 | End: 2020-09-02

## 2020-05-05 NOTE — TELEPHONE ENCOUNTER
"Requested Prescriptions   Pending Prescriptions Disp Refills     senna-docusate (SENOKOT-S/PERICOLACE) 8.6-50 MG tablet 60 tablet 11     Sig: Take 1 tablet by mouth 2 times daily  Last Written Prescription Date:  6/1/18  Last Fill Quantity: 60 tab,  # refills: 11   Last office visit: 1/21/2020 with prescribing provider:  Madai   Future Office Visit:   Next 5 appointments (look out 90 days)    May 05, 2020  3:00 PM CDT  Telephone Visit with Lavelle Aj MD, PHONE,   [facility] ([facility]) [address] [address]  [phone]             Laxatives Protocol Passed - 5/4/2020  6:32 PM        Passed - Patient is age 6 or older        Passed - Recent (12 mo) or future (30 days) visit within the authorizing provider's specialty     Patient has had an office visit with the authorizing provider or a provider within the authorizing providers department within the previous 12 mos or has a future within next 30 days. See \"Patient Info\" tab in inbasket, or \"Choose Columns\" in Meds & Orders section of the refill encounter.              Passed - Medication is active on med list            "

## 2020-05-05 NOTE — PROGRESS NOTES
"Wilma Lorenzana is a 79 year old female who is being evaluated via a billable telephone visit.      The patient has been notified of following:     \"This telephone visit will be conducted via a call between you and your physician/provider. We have found that certain health care needs can be provided without the need for a physical exam.  This service lets us provide the care you need with a short phone conversation.  If a prescription is necessary we can send it directly to your pharmacy.  If lab work is needed we can place an order for that and you can then stop by our lab to have the test done at a later time.    Telephone visits are billed at different rates depending on your insurance coverage. During this emergency period, for some insurers they may be billed the same as an in-person visit.  Please reach out to your insurance provider with any questions.    If during the course of the call the physician/provider feels a telephone visit is not appropriate, you will not be charged for this service.\"    Patient has given verbal consent for Telephone visit?  Yes    What phone number would you like to be contacted at? [phone]    How would you like to obtain your AVS? Mail a copy    Subjective     Wilma Lorenzana is a 79 year old female who presents to clinic today for the following health issues:    HPI            Iron deficiency anemia, unspecified iron deficiency anemia type  Age-related osteoporosis without current pathological fracture  IFG (impaired fasting glucose)  COPD, moderate (H)    I spoke with Wilma's daughter Fetlework today over the phone with regards to her mother's recent 1 week episode of abdominal pain and constipation.  She did request a refill of senna to help with the constipation.  The abdominal pain seems to be centrally located and is not specific to any food that she has been eating.  It does not appear to be related to heartburn or indigestion.  There is been no change in " her bowel movements.  She has been recommended colonoscopy over the years and has repeatedly declined them.  In talking with her daughter today, it appears that the symptoms of her abdominal discomfort have improved significantly since yesterday, but there is still some ongoing aching in the abdomen.    Patient Active Problem List   Diagnosis     Aortic valve disorder     L SHOULDER PAIN     NECK PAIN, HX CERVICAL SURGERY     JOINT PAIN BILATERAL THUMBS     Osteoporosis     Strabismic amblyopia     Nonspecific abnormal results of cardiovascular function study     POSTLAMINECTOMY STATUS-CERV     Helicobacter pylori infection     Degeneration of lumbar or lumbosacral intervertebral disc     SCOLIOSIS     Cervicalgia     Esophageal reflux     Myelomalacia C5-6     CARDIOVASCULAR SCREENING; LDL GOAL LESS THAN 160     Advance Care Planning     Other nonspecific findings on examination of blood(790.99)     Protein in urine     Osteoporosis     HTN (hypertension)     IFG (impaired fasting glucose)     Asthma attack     COPD, moderate (H)     Health Care Home     Mitral valve insufficiency, unspecified etiology     Pulmonary hypertension (H)     Chronic pain syndrome     Intermittent asthma without complication, unspecified asthma severity     Iron deficiency anemia, unspecified iron deficiency anemia type     Mitral valve stenosis, unspecified etiology     Palpitations     Past Surgical History:   Procedure Laterality Date     C DEXA, BONE DENSITY, AXIAL SKEL  2/04    L1-L4 -4.9, L 4- 5.6,FemNk L-3.5,R-3.4, ProxiFArm: -4.4,DistForearm:  -5.1     C FLUOROSCOPIC GUIDE & LOCALIZATION, NEEDLE/CATHETER TIP, SPINE/PARASPINE DX/THERAPEUTIC INJECTION  5/05    lumbar     C LAMINECTOMY,>2 SGMT,CERVICAL  ~1996?     laminectomy C3-7 with laminoplasty     HC CT ABDOMEN WO&W CONTRAST  11/2006    Slight prominence/tortuosity pancreatic duct , 2 small retroperitoneal areas with increased density (stable)     HC MRI CERVICAL SPINE W/O  CONTRAST       HC MRI LUMBAR SPINE W/O CONTRAST  10/04    L5-S1 mild-mod,L3-4/L4-5 mod. bilat foraminal stenosis , L3-4/ L4-5, mod- sev. cent stenosis       Social History     Tobacco Use     Smoking status: Never Smoker     Smokeless tobacco: Never Used   Substance Use Topics     Alcohol use: No     Alcohol/week: 0.0 standard drinks     Family History   Problem Relation Age of Onset     Unknown/Adopted Father      Unknown/Adopted Mother          Current Outpatient Medications   Medication Sig Dispense Refill     albuterol (PROVENTIL) (2.5 MG/3ML) 0.083% neb solution Take 2.5 mg by nebulization every 6 hours as needed for shortness of breath / dyspnea or wheezing       albuterol (VENTOLIN HFA) 108 (90 Base) MCG/ACT inhaler Inhale 2 puffs into the lungs every 4 hours as needed for shortness of breath / dyspnea or wheezing 54 g 3     Cholecalciferol (VITAMIN D) 2000 units tablet Take 2,000 Units by mouth daily 100 tablet 3     diclofenac (VOLTAREN) 1 % topical gel Place 4 g onto the skin 4 times daily USE FOR KNEES 100 g 11     ferrous sulfate (IRON) 325 (65 Fe) MG tablet Take 1 tablet (325 mg) by mouth daily (with breakfast) 90 tablet 3     fluticasone-salmeterol (ADVAIR DISKUS) 250-50 MCG/DOSE inhaler INHALE 1 PUFF BY MOUTH EVERY 12 HOURS, IN THE MORNING AND IN THE EVENING, ABOUT 12 HOURS APART 3 Inhaler 3     IBANdronate (BONIVA) 150 MG tablet TAKE 1 TABLET BY MOUTH EVERY 30 DAYS 1 HR BEFORE BREAKFAST WITH 8OZ OF WATER AND SIT UPRIGHT 30 MIN 3 tablet 3     lisinopril (PRINIVIL/ZESTRIL) 5 MG tablet Take 1 tablet (5 mg) by mouth daily 90 tablet 3     metoprolol succinate ER (TOPROL-XL) 50 MG 24 hr tablet Take 1 tablet (50 mg) by mouth daily 90 tablet 3     multivitamin, therapeutic with minerals (THERA-VIT-M) TABS Take 1 tablet by mouth daily       omeprazole (PRILOSEC) 20 MG DR capsule TAKE 1 CAPSULE (20 MG) BY MOUTH DAILY 90 capsule 3     order for DME Equipment being ordered:     Ensure nutritional supplement -  1/day 30 each 11     order for DME Equipment being ordered: TENS 1 each 0     ORDER FOR DME 1 blood pressure cuff, automatic digital reading for home use 1 Device 0     senna-docusate (SENOKOT-S/PERICOLACE) 8.6-50 MG tablet Take 1 tablet by mouth 2 times daily 60 tablet 11     traMADol (ULTRAM) 50 MG tablet TAKE 1 TABLET BY MOUTH EVERY 6 HOURS AS NEEDED FOR SEVERE PAIN 30 tablet 0     senna-docusate (SENOKOT-S/PERICOLACE) 8.6-50 MG tablet Take 1 tablet by mouth 2 times daily 60 tablet 11     Allergies   Allergen Reactions     Alendronate Sodium      Worse acid reflux     Food      Meat, eggs, dairy     Recent Labs   Lab Test 01/21/20  1329 05/22/18  1154 06/21/17  1034 10/19/15  0900  08/26/14  1116   *  --  105*  --   --  100   HDL 61  --  59  --   --  56   TRIG 97  --  57  --   --  89   ALT 16  --  17 20  --  14   CR 0.55 0.62 0.65 0.66   < > 0.57   GFRESTIMATED >90 >90 89 87   < > >90  Non  GFR Calc     GFRESTBLACK >90 >90 >90   GFR Calc   >90   GFR Calc     < > >90   GFR Calc     POTASSIUM 4.9 4.5 4.3 4.3   < > 3.9    < > = values in this interval not displayed.      BP Readings from Last 3 Encounters:   01/21/20 127/61   01/21/20 130/60   01/28/19 141/65    Wt Readings from Last 3 Encounters:   01/21/20 54.7 kg (120 lb 9.6 oz)   01/21/20 54.4 kg (120 lb)   01/28/19 54.9 kg (121 lb)                    Reviewed and updated as needed this visit by Provider         Review of Systems   ROS COMP: Constitutional, HEENT, cardiovascular, pulmonary, GI, , musculoskeletal, neuro, skin, endocrine and psych systems are negative, except as otherwise noted.       Objective   Reported vitals:  There were no vitals taken for this visit.   healthy, alert and no distress  PSYCH: Alert and oriented times 3; coherent speech, normal   rate and volume, able to articulate logical thoughts, able   to abstract reason, no tangential thoughts, no hallucinations   or  delusions  Her affect is normal  RESP: No cough, no audible wheezing, able to talk in full sentences  Remainder of exam unable to be completed due to telephone visits    Diagnostic Test Results:  Labs reviewed in Epic        Assessment/Plan:  1. Iron deficiency anemia, unspecified iron deficiency anemia type  No blood tests were ordered today we will continue with senna and Colace  - senna-docusate (SENOKOT-S/PERICOLACE) 8.6-50 MG tablet; Take 1 tablet by mouth 2 times daily  Dispense: 60 tablet; Refill: 11    Abdominal pain  We also did schedule her for an appointment to be seen face-to-face in the office on Friday.  If her symptoms do get worse between now and then she will go to the emergency room if not she will plan to come in for an office visit.    No follow-ups on file.      Phone call duration:  11 minutes    Lavelle Aj MD, MD

## 2020-06-02 ENCOUNTER — PATIENT OUTREACH (OUTPATIENT)
Dept: GERIATRIC MEDICINE | Facility: CLINIC | Age: 80
End: 2020-06-02

## 2020-06-02 NOTE — PROGRESS NOTES
[facility] Care Coordination Contact      [facility] Six-Month Telephone Assessment    6 month telephone assessment completed on 6/2/2020 with daughter.    ER visits: No  Hospitalizations: No  TCU stays: No  Significant health status changes: Denies any health concern.   Falls/Injuries: No  ADL/IADL changes: No  Changes in services: No    Caregiver Assessment follow up:  Member lives with daughter. Daughter stated she is independent with ADLs and daughter assist with IADLs.     Goals: See POC in chart for goal progress documentation. CC provided contact information via text per request.     Will see member in 6 months for an annual health risk assessment.   Encouraged member to call CC with any questions or concerns in the meantime.     Dileep Jorge RN Care Coordinator  [facility]  Office: [phone]  Fax: [phone]

## 2020-06-12 DIAGNOSIS — M54.2 CERVICALGIA: ICD-10-CM

## 2020-06-12 DIAGNOSIS — F11.90 CHRONIC, CONTINUOUS USE OF OPIOIDS: ICD-10-CM

## 2020-06-12 DIAGNOSIS — M51.379 DEGENERATION OF LUMBAR OR LUMBOSACRAL INTERVERTEBRAL DISC: ICD-10-CM

## 2020-06-12 RX ORDER — TRAMADOL HYDROCHLORIDE 50 MG/1
TABLET ORAL
Qty: 30 TABLET | Refills: 0 | Status: SHIPPED | OUTPATIENT
Start: 2020-06-12 | End: 2020-06-24

## 2020-06-12 NOTE — TELEPHONE ENCOUNTER
Tramadol 50mg      Last Written Prescription Date:  4/30/2020  Last Fill Quantity: 30,   # refills: 0  Last Office Visit: 5/5/2020   Future Office visit:       Routing refill request to provider for review/approval because:  Drug not on the [facility], P or [facility] refill protocol or controlled substance    Last [facility] website verification:  none *Unable to currently check  under provider*   [email or web address]      Mee DURAN RN

## 2020-06-12 NOTE — TELEPHONE ENCOUNTER
Reason for Call:  Medication or medication refill:    Do you use a [facility]?  Name of the pharmacy and phone number for the current request:  [id number] - [address] [phone]    Name of the medication requested: tramadol    Other request: daughter called and is requesting a 90 day supply i    Can we leave a detailed message on this number? YES    Phone number patient can be reached at: Other phone number:  [phone] daughter Fetlework    Best Time:     Call taken on 6/12/2020 at 10:24 AM by Lesli Rendon

## 2020-06-24 ENCOUNTER — VIRTUAL VISIT (OUTPATIENT)
Dept: FAMILY MEDICINE | Facility: CLINIC | Age: 80
End: 2020-06-24
Payer: COMMERCIAL

## 2020-06-24 DIAGNOSIS — R05.9 COUGH: Primary | ICD-10-CM

## 2020-06-24 DIAGNOSIS — M54.2 CERVICALGIA: ICD-10-CM

## 2020-06-24 DIAGNOSIS — M81.0 SENILE OSTEOPOROSIS: ICD-10-CM

## 2020-06-24 DIAGNOSIS — J45.30 MILD PERSISTENT ASTHMA WITHOUT COMPLICATION: ICD-10-CM

## 2020-06-24 DIAGNOSIS — M51.379 DEGENERATION OF LUMBAR OR LUMBOSACRAL INTERVERTEBRAL DISC: ICD-10-CM

## 2020-06-24 PROCEDURE — 99214 OFFICE O/P EST MOD 30 MIN: CPT | Mod: GT | Performed by: INTERNAL MEDICINE

## 2020-06-24 RX ORDER — TRAMADOL HYDROCHLORIDE 50 MG/1
TABLET ORAL
Qty: 90 TABLET | Refills: 3 | Status: SHIPPED | OUTPATIENT
Start: 2020-06-24 | End: 2021-01-16

## 2020-06-24 RX ORDER — IBANDRONATE SODIUM 150 MG/1
TABLET, FILM COATED ORAL
Qty: 3 TABLET | Refills: 3 | Status: SHIPPED | OUTPATIENT
Start: 2020-06-24 | End: 2020-09-02

## 2020-06-24 ASSESSMENT — PATIENT HEALTH QUESTIONNAIRE - PHQ9: SUM OF ALL RESPONSES TO PHQ QUESTIONS 1-9: 0

## 2020-06-24 ASSESSMENT — ANXIETY QUESTIONNAIRES
1. FEELING NERVOUS, ANXIOUS, OR ON EDGE: NOT AT ALL
2. NOT BEING ABLE TO STOP OR CONTROL WORRYING: NOT AT ALL
3. WORRYING TOO MUCH ABOUT DIFFERENT THINGS: NOT AT ALL

## 2020-06-24 NOTE — PROGRESS NOTES
"Wilma Lorenzana is a 79 year old female who is being evaluated via a billable video visit.      The patient has been notified of following:     \"This video visit will be conducted via a call between you and your physician/provider. We have found that certain health care needs can be provided without the need for an in-person physical exam.  This service lets us provide the care you need with a video conversation.  If a prescription is necessary we can send it directly to your pharmacy.  If lab work is needed we can place an order for that and you can then stop by our lab to have the test done at a later time.    Video visits are billed at different rates depending on your insurance coverage.  Please reach out to your insurance provider with any questions.    If during the course of the call the physician/provider feels a video visit is not appropriate, you will not be charged for this service.\"    Patient has given verbal consent for Video visit? Yes    How would you like to obtain your AVS? Mail a copy  Patient would like the video invitation sent by: Text to cell phone: [phone]    Will anyone else be joining your video visit? No    Subjective     Wilma Lorenzana is a 79 year old female who presents today via video visit for the following health issues:    HPI         Video Start Time: 9:52 AM    Asthma   This visit was visit was conducted over video with daughter, Sergio who acted as interpretor.  Wilma Lorenzana has had worsening dry cough.  She is using Advair 250 mg twice per day and does have a higher dose, 500 mg, available from her pulmonologist.     Patient Active Problem List   Diagnosis     Aortic valve disorder     L SHOULDER PAIN     NECK PAIN, HX CERVICAL SURGERY     JOINT PAIN BILATERAL THUMBS     Osteoporosis     Strabismic amblyopia     Nonspecific abnormal results of cardiovascular function study     POSTLAMINECTOMY STATUS-CERV     Helicobacter pylori infection     Degeneration " of lumbar or lumbosacral intervertebral disc     SCOLIOSIS     Cervicalgia     Esophageal reflux     Myelomalacia C5-6     CARDIOVASCULAR SCREENING; LDL GOAL LESS THAN 160     Advance Care Planning     Other nonspecific findings on examination of blood(790.99)     Protein in urine     Osteoporosis     HTN (hypertension)     IFG (impaired fasting glucose)     Asthma attack     COPD, moderate (H)     Health Care Home     Mitral valve insufficiency, unspecified etiology     Pulmonary hypertension (H)     Intermittent asthma without complication, unspecified asthma severity     Iron deficiency anemia, unspecified iron deficiency anemia type     Mitral valve stenosis, unspecified etiology     Palpitations     Chronic, continuous use of opioids     Past Surgical History:   Procedure Laterality Date     C DEXA, BONE DENSITY, AXIAL SKEL  2/04    L1-L4 -4.9, L 4- 5.6,FemNk L-3.5,R-3.4, ProxiFArm: -4.4,DistForearm:  -5.1     C FLUOROSCOPIC GUIDE & LOCALIZATION, NEEDLE/CATHETER TIP, SPINE/PARASPINE DX/THERAPEUTIC INJECTION  5/05    lumbar     C LAMINECTOMY,>2 SGMT,CERVICAL  ~1996?     laminectomy C3-7 with laminoplasty     HC CT ABDOMEN WO&W CONTRAST  11/2006    Slight prominence/tortuosity pancreatic duct , 2 small retroperitoneal areas with increased density (stable)     HC MRI CERVICAL SPINE W/O CONTRAST       HC MRI LUMBAR SPINE W/O CONTRAST  10/04    L5-S1 mild-mod,L3-4/L4-5 mod. bilat foraminal stenosis , L3-4/ L4-5, mod- sev. cent stenosis       Social History     Tobacco Use     Smoking status: Never Smoker     Smokeless tobacco: Never Used   Substance Use Topics     Alcohol use: No     Alcohol/week: 0.0 standard drinks     Family History   Problem Relation Age of Onset     Unknown/Adopted Father      Unknown/Adopted Mother          Current Outpatient Medications   Medication Sig Dispense Refill     albuterol (PROVENTIL) (2.5 MG/3ML) 0.083% neb solution Take 2.5 mg by nebulization every 6 hours as needed for shortness  of breath / dyspnea or wheezing       albuterol (VENTOLIN HFA) 108 (90 Base) MCG/ACT inhaler Inhale 2 puffs into the lungs every 4 hours as needed for shortness of breath / dyspnea or wheezing 54 g 3     Cholecalciferol (VITAMIN D) 2000 units tablet Take 2,000 Units by mouth daily 100 tablet 3     diclofenac (VOLTAREN) 1 % topical gel Place 4 g onto the skin 4 times daily USE FOR KNEES 100 g 11     ferrous sulfate (IRON) 325 (65 Fe) MG tablet Take 1 tablet (325 mg) by mouth daily (with breakfast) 90 tablet 3     fluticasone-salmeterol (ADVAIR DISKUS) 250-50 MCG/DOSE inhaler INHALE 1 PUFF BY MOUTH EVERY 12 HOURS, IN THE MORNING AND IN THE EVENING, ABOUT 12 HOURS APART 3 Inhaler 3     IBANdronate (BONIVA) 150 MG tablet TAKE 1 TABLET BY MOUTH EVERY 30 DAYS 1 HR BEFORE BREAKFAST WITH 8OZ OF WATER AND SIT UPRIGHT 30 MIN 3 tablet 3     lisinopril (PRINIVIL/ZESTRIL) 5 MG tablet Take 1 tablet (5 mg) by mouth daily 90 tablet 3     metoprolol succinate ER (TOPROL-XL) 50 MG 24 hr tablet Take 1 tablet (50 mg) by mouth daily 90 tablet 3     multivitamin, therapeutic with minerals (THERA-VIT-M) TABS Take 1 tablet by mouth daily       omeprazole (PRILOSEC) 20 MG DR capsule TAKE 1 CAPSULE (20 MG) BY MOUTH DAILY 90 capsule 3     order for DME Equipment being ordered:     Ensure nutritional supplement - 1/day 30 each 11     order for DME Equipment being ordered: TENS 1 each 0     ORDER FOR DME 1 blood pressure cuff, automatic digital reading for home use 1 Device 0     senna-docusate (SENOKOT-S/PERICOLACE) 8.6-50 MG tablet Take 1 tablet by mouth 2 times daily 60 tablet 11     senna-docusate (SENOKOT-S/PERICOLACE) 8.6-50 MG tablet Take 1 tablet by mouth 2 times daily 60 tablet 11     traMADol (ULTRAM) 50 MG tablet TAKE 1 TABLET BY MOUTH EVERY 6 HOURS AS NEEDED FOR SEVERE PAIN 30 tablet 0     Allergies   Allergen Reactions     Alendronate Sodium      Worse acid reflux     Food      Meat, eggs, dairy     Recent Labs   Lab Test  01/21/20  1329 05/22/18  1154 06/21/17  1034 10/19/15  0900  08/26/14  1116   *  --  105*  --   --  100   HDL 61  --  59  --   --  56   TRIG 97  --  57  --   --  89   ALT 16  --  17 20  --  14   CR 0.55 0.62 0.65 0.66   < > 0.57   GFRESTIMATED >90 >90 89 87   < > >90  Non  GFR Calc     GFRESTBLACK >90 >90 >90   GFR Calc   >90   GFR Calc     < > >90   GFR Calc     POTASSIUM 4.9 4.5 4.3 4.3   < > 3.9    < > = values in this interval not displayed.      BP Readings from Last 3 Encounters:   01/21/20 127/61   01/21/20 130/60   01/28/19 141/65    Wt Readings from Last 3 Encounters:   01/21/20 54.7 kg (120 lb 9.6 oz)   01/21/20 54.4 kg (120 lb)   01/28/19 54.9 kg (121 lb)                    Reviewed and updated as needed this visit by Provider         Review of Systems   Constitutional, HEENT, cardiovascular, pulmonary, GI, , musculoskeletal, neuro, skin, endocrine and psych systems are negative, except as otherwise noted.      Objective             Physical Exam     GENERAL: Healthy, alert and no distress  EYES: Eyes grossly normal to inspection.  No discharge or erythema, or obvious scleral/conjunctival abnormalities.  RESP: No audible wheeze, cough, or visible cyanosis.  No visible retractions or increased work of breathing.    SKIN: Visible skin clear. No significant rash, abnormal pigmentation or lesions.  NEURO: Cranial nerves grossly intact.  Mentation and speech appropriate for age.  PSYCH: Mentation appears normal, affect normal/bright, judgement and insight intact, normal speech and appearance well-groomed.      Diagnostic Test Results:  Labs reviewed in Epic        Assessment & Plan     1. LUMB/LUMBOSAC DISC DEGEN  We will continue tramadol for ongoing low back pain.   She was encouraged to keep walking as much as possible  - traMADol (ULTRAM) 50 MG tablet; TAKE 1 TABLET BY MOUTH EVERY 6 HOURS AS NEEDED FOR SEVERE PAIN  Dispense: 90  tablet; Refill: 3    2. Cervicalgia  As above   - traMADol (ULTRAM) 50 MG tablet; TAKE 1 TABLET BY MOUTH EVERY 6 HOURS AS NEEDED FOR SEVERE PAIN  Dispense: 90 tablet; Refill: 3    3. Mild persistent asthma without complication  We will increase dose of advair to 500/50 twice per day and continue albuterol.  We will obtain a chest CT scan today as well and request peak flow meter to continue to monitor  - fluticasone-salmeterol (ADVAIR DISKUS) 500-50 MCG/DOSE inhaler; Inhale 1 puff into the lungs 2 times daily INHALE 1 PUFF BY MOUTH EVERY 12 HOURS, IN THE MORNING AND IN THE EVENING, ABOUT 12 HOURS APART  Dispense: 3 Inhaler; Refill: 3  - CT Chest w/o Contrast; Future  - order for DME; Equipment being ordered: peak flow meter  Dispense: 1 each; Refill: 0    4. Cough  As above   - order for DME; Equipment being ordered: peak flow meter  Dispense: 1 each; Refill: 0    5. Senile osteoporosis  Continue Boniva  - IBANdronate (BONIVA) 150 MG tablet; TAKE 1 TABLET BY MOUTH EVERY 30 DAYS 1 HR BEFORE BREAKFAST WITH 8OZ OF WATER AND SIT UPRIGHT 30 MIN  Dispense: 3 tablet; Refill: 3           No follow-ups on file.    Lavelle Aj MD, MD  [facility]      Video-Visit Details    Type of service:  Video Visit    Video End Time:10:13 AM    Originating Location (pt. Location): Home    Distant Location (provider location):  [facility]     Platform used for Video Visit: Doximity    No follow-ups on file.       Lavelle Aj MD, MD

## 2020-06-29 ENCOUNTER — HOSPITAL ENCOUNTER (OUTPATIENT)
Dept: CT IMAGING | Facility: CLINIC | Age: 80
Discharge: HOME | End: 2020-06-29
Attending: INTERNAL MEDICINE | Admitting: INTERNAL MEDICINE
Payer: COMMERCIAL

## 2020-06-29 DIAGNOSIS — J45.30 MILD PERSISTENT ASTHMA WITHOUT COMPLICATION: ICD-10-CM

## 2020-06-29 PROCEDURE — 71250 CT THORAX DX C-: CPT

## 2020-06-29 NOTE — LETTER
July 1, 2020      [facility]  [address]  [place] [address]      [facility],     I have had the opportunity to review your recent results and an interpretation is as follows:   As we discussed, we will plan for a one-year follow-up chest CT scan to follow-up on the very small lung nodule     Resulted Orders   CT Chest w/o Contrast    Narrative    CT CHEST WITHOUT CONTRAST 6/29/2020 4:57 PM    CLINICAL HISTORY: Mild persistent asthma without complication.    TECHNIQUE: CT chest without IV contrast. Multiplanar reformats were  obtained. Dose reduction techniques were used.  CONTRAST: None.    COMPARISON: CT chest 10/27/2008.    FINDINGS:   LUNGS AND PLEURA: No acute airspace disease or effusion. New but small  pulmonary nodule noted at the left upper lobe midchest that is 0.2 cm  series 4 image 136. There are other stable small nodules.    MEDIASTINUM/AXILLAE: No lymphadenopathy. No thoracic aortic aneurysm.  Small hiatal hernia.    UPPER ABDOMEN: No significant finding.    MUSCULOSKELETAL: Spine degenerative changes.      Impression    IMPRESSION:   1.  No acute abnormality.  2.  A few small pulmonary nodules are seen. While the majority of  these are stable, one of these small nodules is new at the left upper  lobe. See below for follow up imaging guidelines.  3.  Small hiatal hernia.  Recommendations for one or multiple incidental lung nodules < 6 mm:    Low risk patients: No routine follow-up.    High risk patients: Optional follow-up CT at 12 months; if  unchanged, no further follow-up.    *Low Risk: Minimal or absent history of smoking or other known risk  factors.  *Nonsolid (ground glass) or partly solid nodules may require longer  follow-up to exclude indolent adenocarcinoma.  *Recommendations based on Guidelines for the Management of Incidental  Pulmonary Nodules Detected at CT: From the Fleischner Society 2017,  Radiology 2017.    GAEL QUIROS MD       If you have any questions or concerns,  please call the clinic at the number listed above.       Sincerely,        Lavelle Aj MD / dillan

## 2020-07-01 ENCOUNTER — TELEPHONE (OUTPATIENT)
Dept: FAMILY MEDICINE | Facility: CLINIC | Age: 80
End: 2020-07-01

## 2020-07-01 DIAGNOSIS — R05.9 COUGH: ICD-10-CM

## 2020-07-01 DIAGNOSIS — I10 ESSENTIAL HYPERTENSION: Primary | ICD-10-CM

## 2020-07-01 DIAGNOSIS — R91.8 PULMONARY NODULES: ICD-10-CM

## 2020-07-01 RX ORDER — CODEINE PHOSPHATE AND GUAIFENESIN 10; 100 MG/5ML; MG/5ML
SOLUTION ORAL
Qty: 236 ML | Refills: 2 | Status: SHIPPED | OUTPATIENT
Start: 2020-07-01

## 2020-07-01 RX ORDER — LOSARTAN POTASSIUM 25 MG/1
25 TABLET ORAL DAILY
Qty: 90 TABLET | Refills: 3 | Status: ON HOLD | OUTPATIENT
Start: 2020-07-01 | End: 2020-08-25

## 2020-07-01 NOTE — TELEPHONE ENCOUNTER
I spoke to Wilma Lorenzana's daughter Glory and recommended we stop lisinopril and start losartan for cough.  Also, we will prescribe robitussin AC    Also there were small lung nodule seen on her CT scan which were mostly stable with 1/very very small new nodule which was 2 mm in diameter.  I recommended a follow-up CT scan in 1 year

## 2020-07-01 NOTE — RESULT ENCOUNTER NOTE
Hi Awetash,    I have had the opportunity to review your recent results and an interpretation is as follows:  As we discussed, we will plan for a one-year follow-up chest CT scan to follow-up on the very small lung nodule    Sincerely,  Lavelle Aj MD

## 2020-07-03 ENCOUNTER — OFFICE VISIT (OUTPATIENT)
Dept: FAMILY MEDICINE | Facility: CLINIC | Age: 80
End: 2020-07-03
Payer: COMMERCIAL

## 2020-07-03 VITALS
HEART RATE: 75 BPM | SYSTOLIC BLOOD PRESSURE: 123 MMHG | BODY MASS INDEX: 23.85 KG/M2 | TEMPERATURE: 97.3 F | DIASTOLIC BLOOD PRESSURE: 73 MMHG | WEIGHT: 121.5 LBS | OXYGEN SATURATION: 96 % | HEIGHT: 60 IN

## 2020-07-03 DIAGNOSIS — R53.83 OTHER FATIGUE: Primary | ICD-10-CM

## 2020-07-03 LAB
BASOPHILS # BLD AUTO: 0 10E9/L (ref 0–0.2)
BASOPHILS NFR BLD AUTO: 0.3 %
DIFFERENTIAL METHOD BLD: ABNORMAL
EOSINOPHIL # BLD AUTO: 0.1 10E9/L (ref 0–0.7)
EOSINOPHIL NFR BLD AUTO: 4 %
ERYTHROCYTE [DISTWIDTH] IN BLOOD BY AUTOMATED COUNT: 13 % (ref 10–15)
HCT VFR BLD AUTO: 38.3 % (ref 35–47)
HGB BLD-MCNC: 12.5 G/DL (ref 11.7–15.7)
LYMPHOCYTES # BLD AUTO: 2 10E9/L (ref 0.8–5.3)
LYMPHOCYTES NFR BLD AUTO: 56.3 %
MCH RBC QN AUTO: 31.7 PG (ref 26.5–33)
MCHC RBC AUTO-ENTMCNC: 32.6 G/DL (ref 31.5–36.5)
MCV RBC AUTO: 97 FL (ref 78–100)
MONOCYTES # BLD AUTO: 0.5 10E9/L (ref 0–1.3)
MONOCYTES NFR BLD AUTO: 14.4 %
NEUTROPHILS # BLD AUTO: 0.9 10E9/L (ref 1.6–8.3)
NEUTROPHILS NFR BLD AUTO: 25 %
PLATELET # BLD AUTO: 134 10E9/L (ref 150–450)
RBC # BLD AUTO: 3.94 10E12/L (ref 3.8–5.2)
WBC # BLD AUTO: 3.5 10E9/L (ref 4–11)

## 2020-07-03 PROCEDURE — 85025 COMPLETE CBC W/AUTO DIFF WBC: CPT | Performed by: INTERNAL MEDICINE

## 2020-07-03 PROCEDURE — 84443 ASSAY THYROID STIM HORMONE: CPT | Performed by: INTERNAL MEDICINE

## 2020-07-03 PROCEDURE — 80053 COMPREHEN METABOLIC PANEL: CPT | Performed by: INTERNAL MEDICINE

## 2020-07-03 PROCEDURE — 36415 COLL VENOUS BLD VENIPUNCTURE: CPT | Performed by: INTERNAL MEDICINE

## 2020-07-03 PROCEDURE — 99214 OFFICE O/P EST MOD 30 MIN: CPT | Performed by: INTERNAL MEDICINE

## 2020-07-03 ASSESSMENT — MIFFLIN-ST. JEOR: SCORE: 957.62

## 2020-07-03 NOTE — LETTER
July 6, 2020      [facility]  [address]  [place] [address]      [facility],     I have had the opportunity to review your recent results and an interpretation is as follows:   Your complete blood counts shows evidence of low white blood cell count and low platelets and I would recommend consult in hematology  - [phone].     Resulted Orders   CBC with platelets and differential   Result Value Ref Range    WBC 3.5 (L) 4.0 - 11.0 10e9/L    RBC Count 3.94 3.8 - 5.2 10e12/L    Hemoglobin 12.5 11.7 - 15.7 g/dL    Hematocrit 38.3 35.0 - 47.0 %    MCV 97 78 - 100 fl    MCH 31.7 26.5 - 33.0 pg    MCHC 32.6 31.5 - 36.5 g/dL    RDW 13.0 10.0 - 15.0 %    Platelet Count 134 (L) 150 - 450 10e9/L    % Neutrophils 25.0 %    % Lymphocytes 56.3 %    % Monocytes 14.4 %    % Eosinophils 4.0 %    % Basophils 0.3 %    Absolute Neutrophil 0.9 (L) 1.6 - 8.3 10e9/L    Absolute Lymphocytes 2.0 0.8 - 5.3 10e9/L    Absolute Monocytes 0.5 0.0 - 1.3 10e9/L    Absolute Eosinophils 0.1 0.0 - 0.7 10e9/L    Absolute Basophils 0.0 0.0 - 0.2 10e9/L    Diff Method Automated Method        If you have any questions or concerns, please call the clinic at the number listed above.               Sincerely,        Lavelle Aj MD  dillan

## 2020-07-03 NOTE — PATIENT INSTRUCTIONS
(R53.83) Other fatigue  (primary encounter diagnosis)  Comment: We will check labs today including TSH, complete blood counts, comprehensive metabolic panel and consider follow up for carpal tunnel in hand clinic  Plan: TSH with free T4 reflex, CBC with platelets and        differential, **Comprehensive metabolic panel         FUTURE anytime

## 2020-07-03 NOTE — PROGRESS NOTES
Subjective     Wilma Lorenzana is a 79 year old female who presents to clinic today for the following health issues:    HPI     Fatigue    Wilma Lorenzana has continued to feel fatigued.  No other concerns.  Breathing remains stable with cough and asthma.  She has chronic pain symptoms.  She has no abdominal idscomfort at this time.  She is otherwise feeling well.  At our last telephone visit we discussed stopping lisinopril in favor of losartan.      Patient Active Problem List   Diagnosis     Aortic valve disorder     L SHOULDER PAIN     NECK PAIN, HX CERVICAL SURGERY     JOINT PAIN BILATERAL THUMBS     Osteoporosis     Strabismic amblyopia     Nonspecific abnormal results of cardiovascular function study     POSTLAMINECTOMY STATUS-CERV     Helicobacter pylori infection     Degeneration of lumbar or lumbosacral intervertebral disc     SCOLIOSIS     Cervicalgia     Esophageal reflux     Myelomalacia C5-6     CARDIOVASCULAR SCREENING; LDL GOAL LESS THAN 160     Advance Care Planning     Other nonspecific findings on examination of blood(790.99)     Protein in urine     Osteoporosis     HTN (hypertension)     IFG (impaired fasting glucose)     Asthma attack     COPD, moderate (H)     Health Care Home     Mitral valve insufficiency, unspecified etiology     Pulmonary hypertension (H)     Intermittent asthma without complication, unspecified asthma severity     Iron deficiency anemia, unspecified iron deficiency anemia type     Mitral valve stenosis, unspecified etiology     Palpitations     Chronic, continuous use of opioids     Pulmonary nodules     Past Surgical History:   Procedure Laterality Date     C DEXA, BONE DENSITY, AXIAL SKEL  2/04    L1-L4 -4.9, L 4- 5.6,FemNk L-3.5,R-3.4, ProxiFArm: -4.4,DistForearm:  -5.1     C FLUOROSCOPIC GUIDE & LOCALIZATION, NEEDLE/CATHETER TIP, SPINE/PARASPINE DX/THERAPEUTIC INJECTION  5/05    lumbar     C LAMINECTOMY,>2 SGMT,CERVICAL  ~1996?     laminectomy C3-7 with  laminoplasty     HC CT ABDOMEN WO&W CONTRAST  11/2006    Slight prominence/tortuosity pancreatic duct , 2 small retroperitoneal areas with increased density (stable)     HC MRI CERVICAL SPINE W/O CONTRAST       HC MRI LUMBAR SPINE W/O CONTRAST  10/04    L5-S1 mild-mod,L3-4/L4-5 mod. bilat foraminal stenosis , L3-4/ L4-5, mod- sev. cent stenosis       Social History     Tobacco Use     Smoking status: Never Smoker     Smokeless tobacco: Never Used   Substance Use Topics     Alcohol use: No     Alcohol/week: 0.0 standard drinks     Family History   Problem Relation Age of Onset     Unknown/Adopted Father      Unknown/Adopted Mother          Current Outpatient Medications   Medication Sig Dispense Refill     albuterol (PROVENTIL) (2.5 MG/3ML) 0.083% neb solution Take 2.5 mg by nebulization every 6 hours as needed for shortness of breath / dyspnea or wheezing       albuterol (VENTOLIN HFA) 108 (90 Base) MCG/ACT inhaler Inhale 2 puffs into the lungs every 4 hours as needed for shortness of breath / dyspnea or wheezing 54 g 3     Cholecalciferol (VITAMIN D) 2000 units tablet Take 2,000 Units by mouth daily 100 tablet 3     diclofenac (VOLTAREN) 1 % topical gel Place 4 g onto the skin 4 times daily USE FOR KNEES 100 g 11     ferrous sulfate (IRON) 325 (65 Fe) MG tablet Take 1 tablet (325 mg) by mouth daily (with breakfast) 90 tablet 3     fluticasone-salmeterol (ADVAIR DISKUS) 500-50 MCG/DOSE inhaler Inhale 1 puff into the lungs 2 times daily INHALE 1 PUFF BY MOUTH EVERY 12 HOURS, IN THE MORNING AND IN THE EVENING, ABOUT 12 HOURS APART 3 Inhaler 3     guaiFENesin-codeine (ROBITUSSIN AC) 100-10 MG/5ML solution Take 5 mLs by mouth every 4 hours as needed for cough 236 mL 2     IBANdronate (BONIVA) 150 MG tablet TAKE 1 TABLET BY MOUTH EVERY 30 DAYS 1 HR BEFORE BREAKFAST WITH 8OZ OF WATER AND SIT UPRIGHT 30 MIN 3 tablet 3     losartan (COZAAR) 25 MG tablet Take 1 tablet (25 mg) by mouth daily 90 tablet 3     metoprolol  succinate ER (TOPROL-XL) 50 MG 24 hr tablet Take 1 tablet (50 mg) by mouth daily 90 tablet 3     multivitamin, therapeutic with minerals (THERA-VIT-M) TABS Take 1 tablet by mouth daily       omeprazole (PRILOSEC) 20 MG DR capsule TAKE 1 CAPSULE (20 MG) BY MOUTH DAILY 90 capsule 3     order for DME Equipment being ordered: peak flow meter 1 each 0     order for DME Equipment being ordered:     Ensure nutritional supplement - 1/day 30 each 11     order for DME Equipment being ordered: TENS 1 each 0     ORDER FOR DME 1 blood pressure cuff, automatic digital reading for home use 1 Device 0     senna-docusate (SENOKOT-S/PERICOLACE) 8.6-50 MG tablet Take 1 tablet by mouth 2 times daily 60 tablet 11     senna-docusate (SENOKOT-S/PERICOLACE) 8.6-50 MG tablet Take 1 tablet by mouth 2 times daily 60 tablet 11     traMADol (ULTRAM) 50 MG tablet TAKE 1 TABLET BY MOUTH EVERY 6 HOURS AS NEEDED FOR SEVERE PAIN 90 tablet 3     Allergies   Allergen Reactions     Alendronate Sodium      Worse acid reflux     Food      Meat, eggs, dairy     Recent Labs   Lab Test 01/21/20  1329 05/22/18  1154 06/21/17  1034 10/19/15  0900  08/26/14  1116   *  --  105*  --   --  100   HDL 61  --  59  --   --  56   TRIG 97  --  57  --   --  89   ALT 16  --  17 20  --  14   CR 0.55 0.62 0.65 0.66   < > 0.57   GFRESTIMATED >90 >90 89 87   < > >90  Non  GFR Calc     GFRESTBLACK >90 >90 >90   GFR Calc   >90   GFR Calc     < > >90   GFR Calc     POTASSIUM 4.9 4.5 4.3 4.3   < > 3.9    < > = values in this interval not displayed.      BP Readings from Last 3 Encounters:   07/03/20 123/73   01/21/20 127/61   01/21/20 130/60    Wt Readings from Last 3 Encounters:   07/03/20 55.1 kg (121 lb 8 oz)   01/21/20 54.7 kg (120 lb 9.6 oz)   01/21/20 54.4 kg (120 lb)                    Reviewed and updated as needed this visit by Provider         Review of Systems   Constitutional, HEENT,  cardiovascular, pulmonary, GI, , musculoskeletal, neuro, skin, endocrine and psych systems are negative, except as otherwise noted.      Objective    /73 (BP Location: Right arm, Patient Position: Sitting, Cuff Size: Adult Regular)   Pulse 75   Temp 97.3  F (36.3  C) (Temporal)   Ht 1.524 m (5')   Wt 55.1 kg (121 lb 8 oz)   SpO2 96%   Breastfeeding No   BMI 23.73 kg/m    Body mass index is 23.73 kg/m .  Physical Exam   GENERAL: healthy, alert and no distress  EYES: Eyes grossly normal to inspection, PERRL and conjunctivae and sclerae normal  HENT: ear canals and TM's normal, nose and mouth without ulcers or lesions  NECK: no adenopathy, no asymmetry, masses, or scars and thyroid normal to palpation  RESP: lungs clear to auscultation - no rales, rhonchi or wheezes  CV: regular rate and rhythm, 3/6 systolic murmur, click or rub, no peripheral edema and peripheral pulses strong  ABDOMEN: soft, nontender, no hepatosplenomegaly, no masses and bowel sounds normal  MS: no gross musculoskeletal defects noted, no edema  SKIN: no suspicious lesions or rashes  NEURO: Normal strength and tone, mentation intact and speech normal  PSYCH: mentation appears normal, affect normal/bright    Diagnostic Test Results:  Labs reviewed in Epic        Assessment & Plan     Patient Instructions   (R53.83) Other fatigue  (primary encounter diagnosis)  Comment: We will check labs today including TSH, complete blood counts, comprehensive metabolic panel and consider follow up for carpal tunnel in hand clinic  Plan: TSH with free T4 reflex, CBC with platelets and        differential, **Comprehensive metabolic panel         FUTURE anytime              Cough  Comment: recommend use of losartan to replace lisinopril             No follow-ups on file.    Lavelle Aj MD, MD  [facility]

## 2020-07-03 NOTE — LETTER
July 10, 2020      [facility]  [address]  [place] [address]        [facility],     I have had the opportunity to review your recent results and an interpretation is as follows:   Your comprehensive metabolic panel shows stable renal function and electrolytes   Your thyroid function also returned within normal limits           Resulted Orders   TSH with free T4 reflex   Result Value Ref Range    TSH 0.80 0.40 - 4.00 mU/L   CBC with platelets and differential   Result Value Ref Range    WBC 3.5 (L) 4.0 - 11.0 10e9/L    RBC Count 3.94 3.8 - 5.2 10e12/L    Hemoglobin 12.5 11.7 - 15.7 g/dL    Hematocrit 38.3 35.0 - 47.0 %    MCV 97 78 - 100 fl    MCH 31.7 26.5 - 33.0 pg    MCHC 32.6 31.5 - 36.5 g/dL    RDW 13.0 10.0 - 15.0 %    Platelet Count 134 (L) 150 - 450 10e9/L    % Neutrophils 25.0 %    % Lymphocytes 56.3 %    % Monocytes 14.4 %    % Eosinophils 4.0 %    % Basophils 0.3 %    Absolute Neutrophil 0.9 (L) 1.6 - 8.3 10e9/L    Absolute Lymphocytes 2.0 0.8 - 5.3 10e9/L    Absolute Monocytes 0.5 0.0 - 1.3 10e9/L    Absolute Eosinophils 0.1 0.0 - 0.7 10e9/L    Absolute Basophils 0.0 0.0 - 0.2 10e9/L    Diff Method Automated Method    **Comprehensive metabolic panel FUTURE anytime   Result Value Ref Range    Sodium 141 133 - 144 mmol/L    Potassium 4.4 3.4 - 5.3 mmol/L    Chloride 109 94 - 109 mmol/L    Carbon Dioxide 29 20 - 32 mmol/L    Anion Gap 3 3 - 14 mmol/L    Glucose 112 (H) 70 - 99 mg/dL    Urea Nitrogen 12 7 - 30 mg/dL    Creatinine 0.62 0.52 - 1.04 mg/dL    GFR Estimate 87 >60 mL/min/[1.73_m2]      Comment:      Non  GFR Calc  Starting 12/18/2018, serum creatinine based estimated GFR (eGFR) will be   calculated using the Chronic Kidney Disease Epidemiology Collaboration   (CKD-EPI) equation.      GFR Estimate If Black >90 >60 mL/min/[1.73_m2]      Comment:       GFR Calc  Starting 12/18/2018, serum creatinine based estimated GFR (eGFR) will be   calculated using  the Chronic Kidney Disease Epidemiology Collaboration   (CKD-EPI) equation.      Calcium 8.6 8.5 - 10.1 mg/dL    Bilirubin Total 0.2 0.2 - 1.3 mg/dL    Albumin 3.8 3.4 - 5.0 g/dL    Protein Total 8.0 6.8 - 8.8 g/dL    Alkaline Phosphatase 80 40 - 150 U/L    ALT 19 0 - 50 U/L    AST 19 0 - 45 U/L       Sincerely,     Lavelle Aj MD

## 2020-07-04 ASSESSMENT — ASTHMA QUESTIONNAIRES: ACT_TOTALSCORE: 13

## 2020-07-05 ENCOUNTER — TELEPHONE (OUTPATIENT)
Dept: FAMILY MEDICINE | Facility: CLINIC | Age: 80
End: 2020-07-05

## 2020-07-05 DIAGNOSIS — D70.9 NEUTROPENIA, UNSPECIFIED TYPE (H): ICD-10-CM

## 2020-07-05 DIAGNOSIS — D69.6 THROMBOCYTOPENIA (H): Primary | ICD-10-CM

## 2020-07-05 NOTE — TELEPHONE ENCOUNTER
Can we call Wilma Lorenzana and let her know that     Simon Dillon,    I have had the opportunity to review your recent results and an interpretation is as follows:  Your complete blood counts shows evidence of low white blood cell count and low platelets and I would recommend consult in hematology  - [phone].    Sincerely,  Lavelle Aj MD

## 2020-07-05 NOTE — RESULT ENCOUNTER NOTE
Hi Awetash,    I have had the opportunity to review your recent results and an interpretation is as follows:  Your complete blood counts shows evidence of low white blood cell count and low platelets and I would recommend consult in hematology  - [phone].    Sincerely,  Lavelle Aj MD

## 2020-07-06 LAB
ALBUMIN SERPL-MCNC: 3.8 G/DL (ref 3.4–5)
ALP SERPL-CCNC: 80 U/L (ref 40–150)
ALT SERPL W P-5'-P-CCNC: 19 U/L (ref 0–50)
ANION GAP SERPL CALCULATED.3IONS-SCNC: 3 MMOL/L (ref 3–14)
AST SERPL W P-5'-P-CCNC: 19 U/L (ref 0–45)
BILIRUB SERPL-MCNC: 0.2 MG/DL (ref 0.2–1.3)
BUN SERPL-MCNC: 12 MG/DL (ref 7–30)
CALCIUM SERPL-MCNC: 8.6 MG/DL (ref 8.5–10.1)
CHLORIDE SERPL-SCNC: 109 MMOL/L (ref 94–109)
CO2 SERPL-SCNC: 29 MMOL/L (ref 20–32)
CREAT SERPL-MCNC: 0.62 MG/DL (ref 0.52–1.04)
GFR SERPL CREATININE-BSD FRML MDRD: 87 ML/MIN/{1.73_M2}
GLUCOSE SERPL-MCNC: 112 MG/DL (ref 70–99)
POTASSIUM SERPL-SCNC: 4.4 MMOL/L (ref 3.4–5.3)
PROT SERPL-MCNC: 8 G/DL (ref 6.8–8.8)
SODIUM SERPL-SCNC: 141 MMOL/L (ref 133–144)
TSH SERPL DL<=0.005 MIU/L-ACNC: 0.8 MU/L (ref 0.4–4)

## 2020-07-07 ENCOUNTER — APPOINTMENT (OUTPATIENT)
Dept: INTERPRETER SERVICES | Facility: CLINIC | Age: 80
End: 2020-07-07
Payer: COMMERCIAL

## 2020-07-10 NOTE — RESULT ENCOUNTER NOTE
Hi Awetash,    I have had the opportunity to review your recent results and an interpretation is as follows:  Your comprehensive metabolic panel shows stable renal function and electrolytes  Your thyroid function also returned within normal limits       Sincerely,  Lavelle Aj MD

## 2020-07-15 ENCOUNTER — TELEPHONE (OUTPATIENT)
Dept: FAMILY MEDICINE | Facility: CLINIC | Age: 80
End: 2020-07-15

## 2020-07-15 DIAGNOSIS — J44.9 COPD, MODERATE (H): Primary | ICD-10-CM

## 2020-07-15 DIAGNOSIS — M54.2 CERVICALGIA: ICD-10-CM

## 2020-07-15 DIAGNOSIS — M51.379 DEGENERATION OF LUMBAR OR LUMBOSACRAL INTERVERTEBRAL DISC: ICD-10-CM

## 2020-07-15 RX ORDER — TRAMADOL HYDROCHLORIDE 50 MG/1
TABLET ORAL
Qty: 90 TABLET | Refills: 3 | Status: CANCELLED | OUTPATIENT
Start: 2020-07-15

## 2020-07-15 NOTE — TELEPHONE ENCOUNTER
Fax from pharmacy    [facility] 103/60 is not formulary    Please send alternative or start PA    LOV 7-3-20 RT Moisés (R)

## 2020-07-15 NOTE — TELEPHONE ENCOUNTER
Refill request:    TRAMADOL 50 MG TABLETS    Summary: TAKE 1 TABLET BY MOUTH EVERY 6 HOURS AS NEEDED FOR SEVERE PAIN, Disp-90 tablet,R-3, E-Prescribe   Start: 6/24/2020  Ord/Sold: 6/24/2020

## 2020-07-16 NOTE — TELEPHONE ENCOUNTER
Controlled Substance Refill Request for   traMADol (ULTRAM) 50 MG tablet  90 tablet  3  6/24/2020        Problem List Complete:  Yes    Last Written Prescription Date:  6/24/2020  Last Fill Quantity: 90,   # refills: 3    THE MOST RECENT OFFICE VISIT MUST BE WITHIN THE PAST 3 MONTHS. AT LEAST ONE FACE TO FACE VISIT MUST OCCUR EVERY 6 MONTHS. ADDITIONAL VISITS CAN BE VIRTUAL.  (THIS STATEMENT SHOULD BE DELETED.)    [place] [facility] [place]: 7/3/2020    Future Office visit:     Controlled substance agreement:   Encounter-Level CSA:    There are no encounter-level csa.     Patient-Level CSA:    There are no patient-level csa.         Last Urine Drug Screen: No results found for: CDAUT, No results found for: COMDAT, No results found for: THC13, PCP13, COC13, MAMP13, OPI13, AMP13, BZO13, TCA13, MTD13, BAR13, OXY13, PPX13, BUP13     Processing:  Rx to be electronically transmitted to pharmacy by provider     [email or web address]   checked in past 3 months?  No, route to RN

## 2020-07-17 NOTE — TELEPHONE ENCOUNTER
"*Unable to currently check  under provider*    Per pharmacy dispense - last filled 6/24/2020     Called [facility] - script is \"closed\" on their end by mistake - they reactivated script with refills     Mee DURAN RN    "

## 2020-07-21 NOTE — TELEPHONE ENCOUNTER
ONCOLOGY INTAKE: Records Information      APPT INFORMATION:  Referring provider:  Dr. Lavelle Aj MD  Referring provider s clinic:  [facility]  Reason for visit/diagnosis:  Thrombocytopenia (H) [D69.6]  Neutropenia, unspecified type (H) [D70.9]  Has patient been notified of appointment date and time?: Yes    RECORDS INFORMATION:  Were the records received with the referral (via [employer])? No,Internal Referral      Has patient been seen for any external appt for this diagnosis? No    If yes, where? NA      ADDITIONAL INFORMATION:  None

## 2020-07-22 NOTE — TELEPHONE ENCOUNTER
RECORDS STATUS - ALL OTHER DIAGNOSIS      RECORDS RECEIVED FROM: [facility]   DATE RECEIVED: 8/4/2020    NOTES STATUS DETAILS   OFFICE NOTE from referring provider Complete Lavelle Aj MD    OFFICE NOTE from medical oncologist N/a    DISCHARGE SUMMARY from hospital N/A    DISCHARGE REPORT from the ER     OPERATIVE REPORT N/A    MEDICATION LIST Complete [facility]   CLINICAL TRIAL TREATMENTS TO DATE     LABS     PATHOLOGY REPORTS     ANYTHING RELATED TO DIAGNOSIS Complete Labs last updated on 7/3/2020    GENONOMIC TESTING     TYPE:     IMAGING (NEED IMAGES & REPORT)     CT SCANS     MRI     MAMMO     ULTRASOUND     PET

## 2020-07-28 ENCOUNTER — VIRTUAL VISIT (OUTPATIENT)
Dept: FAMILY MEDICINE | Facility: CLINIC | Age: 80
End: 2020-07-28
Payer: COMMERCIAL

## 2020-07-28 DIAGNOSIS — D70.9 NEUTROPENIA, UNSPECIFIED TYPE (H): ICD-10-CM

## 2020-07-28 DIAGNOSIS — I10 ESSENTIAL HYPERTENSION: ICD-10-CM

## 2020-07-28 DIAGNOSIS — D69.6 THROMBOCYTOPENIA (H): ICD-10-CM

## 2020-07-28 PROCEDURE — 99214 OFFICE O/P EST MOD 30 MIN: CPT | Mod: 95 | Performed by: INTERNAL MEDICINE

## 2020-07-28 RX ORDER — FLUTICASONE PROPIONATE AND SALMETEROL XINAFOATE 230; 21 UG/1; UG/1
2 AEROSOL, METERED RESPIRATORY (INHALATION) 2 TIMES DAILY
Qty: 3 INHALER | Refills: 3 | Status: SHIPPED | OUTPATIENT
Start: 2020-07-28 | End: 2020-11-27

## 2020-07-28 NOTE — TELEPHONE ENCOUNTER
Can we ask pharmacy what would be the equivalent for Advair 500/50 that is approved by her formulary?

## 2020-07-28 NOTE — PROGRESS NOTES
"Wilma Lorenzana is a 79 year old female who is being evaluated via a billable video visit.      The patient has been notified of following:     \"This video visit will be conducted via a call between you and your physician/provider. We have found that certain health care needs can be provided without the need for an in-person physical exam.  This service lets us provide the care you need with a video conversation.  If a prescription is necessary we can send it directly to your pharmacy.  If lab work is needed we can place an order for that and you can then stop by our lab to have the test done at a later time.    Video visits are billed at different rates depending on your insurance coverage.  Please reach out to your insurance provider with any questions.    If during the course of the call the physician/provider feels a video visit is not appropriate, you will not be charged for this service.\"    Patient has given verbal consent for Video visit? Yes  How would you like to obtain your AVS? MyChart  If you are dropped from the video visit, the video invite should be resent to: Text to cell phone: [phone]  Will anyone else be joining your video visit? No    Subjective     Wilma Lorenzana is a 79 year old female who presents today via video visit for the following health issues:    [facility]           Video Start Time: 4:20 PM       Neutropenia, unspecified type (H)  Thrombocytopenia (H)  Essential hypertension   I spoke to Wilma Lorenzana's daughter over video conference today with regards to her recent finding of neutropenia and thrombocytopenia.  In reviewing her labs over the course of decade she has had ongoing consistently low white blood cell count with neutropenia and occasional thrombocytopenia.  She is never had any work-up for this and I did refer her for evaluation in hematology.  Her daughter is concerned and that the risk of COVID may outweigh the benefits of pursuing any work-up at this point. "  She is wondering if this could be postponed until the end of the pandemic.  Also we discussed her recent change in blood pressure medication.  With losartan her blood pressure is stable at her current dose of 25 mg.  Blood pressures been 120s over 60s at times.  There are elevations in the early morning into the 160s systolic prior to her taking her blood pressure medications, but usually her blood pressure will be normalizing as the day goes on.  She has had slight improvement in her cough and switching from lisinopril to losartan.      Patient Active Problem List   Diagnosis     Aortic valve disorder     L SHOULDER PAIN     NECK PAIN, HX CERVICAL SURGERY     JOINT PAIN BILATERAL THUMBS     Osteoporosis     Strabismic amblyopia     Nonspecific abnormal results of cardiovascular function study     POSTLAMINECTOMY STATUS-CERV     Helicobacter pylori infection     Degeneration of lumbar or lumbosacral intervertebral disc     SCOLIOSIS     Cervicalgia     Esophageal reflux     Myelomalacia C5-6     CARDIOVASCULAR SCREENING; LDL GOAL LESS THAN 160     Advance Care Planning     Other nonspecific findings on examination of blood(790.99)     Protein in urine     Osteoporosis     HTN (hypertension)     IFG (impaired fasting glucose)     Asthma attack     COPD, moderate (H)     Health Care Home     Mitral valve insufficiency, unspecified etiology     Pulmonary hypertension (H)     Intermittent asthma without complication, unspecified asthma severity     Iron deficiency anemia, unspecified iron deficiency anemia type     Mitral valve stenosis, unspecified etiology     Palpitations     Chronic, continuous use of opioids     Pulmonary nodules     Thrombocytopenia (H)     Neutropenia, unspecified type (H)     Past Surgical History:   Procedure Laterality Date     C DEXA, BONE DENSITY, AXIAL SKEL  2/04    L1-L4 -4.9, L 4- 5.6,FemNk L-3.5,R-3.4, ProxiFArm: -4.4,DistForearm:  -5.1     C FLUOROSCOPIC GUIDE & LOCALIZATION,  NEEDLE/CATHETER TIP, SPINE/PARASPINE DX/THERAPEUTIC INJECTION  5/05    lumbar     C LAMINECTOMY,>2 SGMT,CERVICAL  ~1996?     laminectomy C3-7 with laminoplasty     HC CT ABDOMEN WO&W CONTRAST  11/2006    Slight prominence/tortuosity pancreatic duct , 2 small retroperitoneal areas with increased density (stable)     HC MRI CERVICAL SPINE W/O CONTRAST       HC MRI LUMBAR SPINE W/O CONTRAST  10/04    L5-S1 mild-mod,L3-4/L4-5 mod. bilat foraminal stenosis , L3-4/ L4-5, mod- sev. cent stenosis       Social History     Tobacco Use     Smoking status: Never Smoker     Smokeless tobacco: Never Used   Substance Use Topics     Alcohol use: No     Alcohol/week: 0.0 standard drinks     Family History   Problem Relation Age of Onset     Unknown/Adopted Father      Unknown/Adopted Mother          Current Outpatient Medications   Medication Sig Dispense Refill     albuterol (PROVENTIL) (2.5 MG/3ML) 0.083% neb solution Take 2.5 mg by nebulization every 6 hours as needed for shortness of breath / dyspnea or wheezing       albuterol (VENTOLIN HFA) 108 (90 Base) MCG/ACT inhaler Inhale 2 puffs into the lungs every 4 hours as needed for shortness of breath / dyspnea or wheezing 54 g 3     Cholecalciferol (VITAMIN D) 2000 units tablet Take 2,000 Units by mouth daily 100 tablet 3     diclofenac (VOLTAREN) 1 % topical gel Place 4 g onto the skin 4 times daily USE FOR KNEES 100 g 11     ferrous sulfate (IRON) 325 (65 Fe) MG tablet Take 1 tablet (325 mg) by mouth daily (with breakfast) 90 tablet 3     fluticasone-salmeterol (ADVAIR-HFA) 230-21 MCG/ACT inhaler Inhale 2 puffs into the lungs 2 times daily 3 Inhaler 3     guaiFENesin-codeine (ROBITUSSIN AC) 100-10 MG/5ML solution Take 5 mLs by mouth every 4 hours as needed for cough 236 mL 2     IBANdronate (BONIVA) 150 MG tablet TAKE 1 TABLET BY MOUTH EVERY 30 DAYS 1 HR BEFORE BREAKFAST WITH 8OZ OF WATER AND SIT UPRIGHT 30 MIN 3 tablet 3     losartan (COZAAR) 25 MG tablet Take 1 tablet (25 mg)  by mouth daily 90 tablet 3     metoprolol succinate ER (TOPROL-XL) 50 MG 24 hr tablet Take 1 tablet (50 mg) by mouth daily 90 tablet 3     multivitamin, therapeutic with minerals (THERA-VIT-M) TABS Take 1 tablet by mouth daily       omeprazole (PRILOSEC) 20 MG DR capsule TAKE 1 CAPSULE (20 MG) BY MOUTH DAILY 90 capsule 3     order for DME Equipment being ordered: peak flow meter 1 each 0     order for DME Equipment being ordered:     Ensure nutritional supplement - 1/day 30 each 11     order for DME Equipment being ordered: TENS 1 each 0     ORDER FOR DME 1 blood pressure cuff, automatic digital reading for home use 1 Device 0     senna-docusate (SENOKOT-S/PERICOLACE) 8.6-50 MG tablet Take 1 tablet by mouth 2 times daily 60 tablet 11     senna-docusate (SENOKOT-S/PERICOLACE) 8.6-50 MG tablet Take 1 tablet by mouth 2 times daily 60 tablet 11     traMADol (ULTRAM) 50 MG tablet TAKE 1 TABLET BY MOUTH EVERY 6 HOURS AS NEEDED FOR SEVERE PAIN 90 tablet 3     Allergies   Allergen Reactions     Alendronate Sodium      Worse acid reflux     Food      Meat, eggs, dairy     Recent Labs   Lab Test 07/03/20  1619 01/21/20  1329  06/21/17  1034  08/26/14  1116   LDL  --  126*  --  105*  --  100   HDL  --  61  --  59  --  56   TRIG  --  97  --  57  --  89   ALT 19 16  --  17   < > 14   CR 0.62 0.55   < > 0.65   < > 0.57   GFRESTIMATED 87 >90   < > 89   < > >90  Non  GFR Calc     GFRESTBLACK >90 >90   < > >90  African American GFR Calc     < > >90   GFR Calc     POTASSIUM 4.4 4.9   < > 4.3   < > 3.9   TSH 0.80  --   --   --   --   --     < > = values in this interval not displayed.      BP Readings from Last 3 Encounters:   07/03/20 123/73   01/21/20 127/61   01/21/20 130/60    Wt Readings from Last 3 Encounters:   07/03/20 55.1 kg (121 lb 8 oz)   01/21/20 54.7 kg (120 lb 9.6 oz)   01/21/20 54.4 kg (120 lb)                    Reviewed and updated as needed this visit by Provider         Review of  Systems   Constitutional, HEENT, cardiovascular, pulmonary, GI, , musculoskeletal, neuro, skin, endocrine and psych systems are negative, except as otherwise noted.      Objective             Physical Exam     deferred      Diagnostic Test Results:  Labs reviewed in Epic        Assessment & Plan     1. Neutropenia, unspecified type (H)  We discussed that it would be reasonable to postpone the work-up until after the pandemic as she is had over a decade of low white blood cell count and it does not seem that this is an urgent issue.    2. Thrombocytopenia (H)  As above    3. Essential hypertension  I recommended no change in her blood pressure meds at this time and to continue losartan 25 mg and we can monitor and follow-up in 6 months             No follow-ups on file.    Lavelle Aj MD, MD  [facility]      Video-Visit Details    Type of service:  Video Visit    Video End Time:4:41 PM    Originating Location (pt. Location): Home    Distant Location (provider location):  [facility]     Platform used for Video Visit: Doximity    No follow-ups on file.       Lavelle Aj MD, MD

## 2020-07-28 NOTE — TELEPHONE ENCOUNTER
It appears Advair HFA is on formulary.  I've pended equivalent dose inhaler.    Cate Daniel, PharmD, [facility]  Medication Therapy Management Provider  Pager: [phone]

## 2020-07-28 NOTE — TELEPHONE ENCOUNTER
Cate,    What would alternatives to Advair 500/50 be to check patient's formulary?    Tony Rucker, CMA on 7/28/2020 at 12:43 PM

## 2020-07-29 NOTE — PROGRESS NOTES
Hematology Consultation:  Date on this visit: 8/4/2020    Abhinav Lorenzana  is referred by  for a hematology consultation. She requires evaluation for new diagnosis of neutropenia and thrombocytopenia.    PCP: Lavelle Aj     History Of Present Illness:  Ms. Lorenzana is a 79 year old female who presents for evaluation  of  neutropenia and thrombocytopenia.    Results for ABHINAV LORENZANA (MRN [id number]) as of 7/31/2020 19:44   Ref. Range 11/24/2015 14:44 6/21/2017 10:34 5/22/2018 11:54 1/21/2020 13:29 7/3/2020 16:19   Platelet Count Latest Ref Range: 150 - 450 10e9/L 175 156 157 173 134 (L)   She has had intermittent neutropenia dating back to 2011 as below:  Results for ABHINAV LORENZANA (MRN [id number]) as of 7/31/2020 19:44   Ref. Range 10/27/2006 14:20 11/24/2006 10:05 3/19/2011 08:15 4/11/2011 15:16 8/17/2011 15:05 12/28/2012 07:27 12/29/2012 09:55 1/2/2013 08:35 8/26/2014 11:16 10/2/2015 03:55 6/21/2017 10:35 7/3/2020 16:19   Absolute Neutrophil Latest Ref Range: 1.6 - 8.3 10e9/L 3.1 1.8 0.7 (L) 1.7 1.9 3.1 0.9 (L) 2.3 1.3 (L) 7.8 0.8 (L) 0.9 (L)   ALC has remained normal. Hemoglobin is normal as well. TSH, creat and LFTs were normal.  Pt no showed for this virtual visit- no answer.

## 2020-08-04 ENCOUNTER — PRE VISIT (OUTPATIENT)
Dept: ONCOLOGY | Facility: CLINIC | Age: 80
End: 2020-08-04

## 2020-08-04 ENCOUNTER — VIRTUAL VISIT (OUTPATIENT)
Dept: ONCOLOGY | Facility: CLINIC | Age: 80
End: 2020-08-04
Attending: INTERNAL MEDICINE
Payer: COMMERCIAL

## 2020-08-04 DIAGNOSIS — D70.9 NEUTROPENIA, UNSPECIFIED TYPE (H): ICD-10-CM

## 2020-08-04 DIAGNOSIS — D69.6 THROMBOCYTOPENIA (H): ICD-10-CM

## 2020-08-04 PROCEDURE — 99207 ZZC NO SHOW FOR SCHEDULED APPT: CPT | Performed by: INTERNAL MEDICINE

## 2020-08-04 NOTE — LETTER
8/4/2020         RE: Wilma Lorenzana  [address]  [place] [address]        Dear Colleague,    Thank you for referring your patient, Wilma Lorenzana, to the [facility]. Please see a copy of my visit note below.    Hematology Consultation:  Date on this visit: 8/4/2020    Wilma Lorenzana  is referred by  for a hematology consultation. She requires evaluation for new diagnosis of neutropenia and thrombocytopenia.    PCP: Lavlele Aj     History Of Present Illness:  Ms. Lorenzana is a 79 year old female who presents for evaluation  of  neutropenia and thrombocytopenia.    Results for WILMA LORENZANA (MRN [id number]) as of 7/31/2020 19:44   Ref. Range 11/24/2015 14:44 6/21/2017 10:34 5/22/2018 11:54 1/21/2020 13:29 7/3/2020 16:19   Platelet Count Latest Ref Range: 150 - 450 10e9/L 175 156 157 173 134 (L)   She has had intermittent neutropenia dating back to 2011 as below:  Results for WILMA LORENZANA (MRN [id number]) as of 7/31/2020 19:44   Ref. Range 10/27/2006 14:20 11/24/2006 10:05 3/19/2011 08:15 4/11/2011 15:16 8/17/2011 15:05 12/28/2012 07:27 12/29/2012 09:55 1/2/2013 08:35 8/26/2014 11:16 10/2/2015 03:55 6/21/2017 10:35 7/3/2020 16:19   Absolute Neutrophil Latest Ref Range: 1.6 - 8.3 10e9/L 3.1 1.8 0.7 (L) 1.7 1.9 3.1 0.9 (L) 2.3 1.3 (L) 7.8 0.8 (L) 0.9 (L)   ALC has remained normal. Hemoglobin is normal as well. TSH, creat and LFTs were normal.  Pt no showed for this virtual visit- no answer.        Again, thank you for allowing me to participate in the care of your patient.        Sincerely,        Machelle Langley MD, MD

## 2020-08-23 ENCOUNTER — HOSPITAL ENCOUNTER (OUTPATIENT)
Facility: CLINIC | Age: 80
Setting detail: OBSERVATION
Discharge: HOME | End: 2020-08-25
Attending: EMERGENCY MEDICINE | Admitting: INTERNAL MEDICINE
Payer: COMMERCIAL

## 2020-08-23 ENCOUNTER — APPOINTMENT (OUTPATIENT)
Dept: CARDIOLOGY | Facility: CLINIC | Age: 80
End: 2020-08-23
Attending: INTERNAL MEDICINE
Payer: COMMERCIAL

## 2020-08-23 ENCOUNTER — APPOINTMENT (OUTPATIENT)
Dept: GENERAL RADIOLOGY | Facility: CLINIC | Age: 80
End: 2020-08-23
Attending: EMERGENCY MEDICINE
Payer: COMMERCIAL

## 2020-08-23 DIAGNOSIS — I05.0 MITRAL VALVE STENOSIS, UNSPECIFIED ETIOLOGY: ICD-10-CM

## 2020-08-23 DIAGNOSIS — H81.10 BENIGN PAROXYSMAL POSITIONAL VERTIGO, UNSPECIFIED LATERALITY: ICD-10-CM

## 2020-08-23 DIAGNOSIS — I10 ESSENTIAL HYPERTENSION: ICD-10-CM

## 2020-08-23 DIAGNOSIS — H81.12 BENIGN PAROXYSMAL POSITIONAL VERTIGO OF LEFT EAR: Primary | ICD-10-CM

## 2020-08-23 DIAGNOSIS — R55 SYNCOPE, UNSPECIFIED SYNCOPE TYPE: ICD-10-CM

## 2020-08-23 LAB
ALBUMIN SERPL-MCNC: 3.8 G/DL (ref 3.4–5)
ALP SERPL-CCNC: 74 U/L (ref 40–150)
ALT SERPL W P-5'-P-CCNC: 16 U/L (ref 0–50)
ANION GAP SERPL CALCULATED.3IONS-SCNC: 3 MMOL/L (ref 3–14)
AST SERPL W P-5'-P-CCNC: 13 U/L (ref 0–45)
BASOPHILS # BLD AUTO: 0 10E9/L (ref 0–0.2)
BASOPHILS NFR BLD AUTO: 0.3 %
BILIRUB SERPL-MCNC: 0.4 MG/DL (ref 0.2–1.3)
BUN SERPL-MCNC: 16 MG/DL (ref 7–30)
CALCIUM SERPL-MCNC: 8.7 MG/DL (ref 8.5–10.1)
CHLORIDE SERPL-SCNC: 106 MMOL/L (ref 94–109)
CO2 SERPL-SCNC: 29 MMOL/L (ref 20–32)
CREAT SERPL-MCNC: 0.64 MG/DL (ref 0.52–1.04)
DIFFERENTIAL METHOD BLD: ABNORMAL
EOSINOPHIL # BLD AUTO: 0.1 10E9/L (ref 0–0.7)
EOSINOPHIL NFR BLD AUTO: 3.4 %
ERYTHROCYTE [DISTWIDTH] IN BLOOD BY AUTOMATED COUNT: 13.3 % (ref 10–15)
GFR SERPL CREATININE-BSD FRML MDRD: 85 ML/MIN/{1.73_M2}
GLUCOSE SERPL-MCNC: 87 MG/DL (ref 70–99)
HCT VFR BLD AUTO: 36 % (ref 35–47)
HGB BLD-MCNC: 11.5 G/DL (ref 11.7–15.7)
IMM GRANULOCYTES # BLD: 0 10E9/L (ref 0–0.4)
IMM GRANULOCYTES NFR BLD: 0 %
INTERPRETATION ECG - MUSE: NORMAL
LYMPHOCYTES # BLD AUTO: 1.7 10E9/L (ref 0.8–5.3)
LYMPHOCYTES NFR BLD AUTO: 55.9 %
MCH RBC QN AUTO: 30.8 PG (ref 26.5–33)
MCHC RBC AUTO-ENTMCNC: 31.9 G/DL (ref 31.5–36.5)
MCV RBC AUTO: 97 FL (ref 78–100)
MONOCYTES # BLD AUTO: 0.4 10E9/L (ref 0–1.3)
MONOCYTES NFR BLD AUTO: 11.8 %
NEUTROPHILS # BLD AUTO: 0.9 10E9/L (ref 1.6–8.3)
NEUTROPHILS NFR BLD AUTO: 28.6 %
NRBC # BLD AUTO: 0 10*3/UL
NRBC BLD AUTO-RTO: 0 /100
PLATELET # BLD AUTO: 160 10E9/L (ref 150–450)
POTASSIUM SERPL-SCNC: 4 MMOL/L (ref 3.4–5.3)
PROT SERPL-MCNC: 8.1 G/DL (ref 6.8–8.8)
RBC # BLD AUTO: 3.73 10E12/L (ref 3.8–5.2)
SODIUM SERPL-SCNC: 138 MMOL/L (ref 133–144)
TROPONIN I SERPL-MCNC: 0.02 UG/L (ref 0–0.04)
TROPONIN I SERPL-MCNC: 0.02 UG/L (ref 0–0.04)
TROPONIN I SERPL-MCNC: <0.015 UG/L (ref 0–0.04)
WBC # BLD AUTO: 3 10E9/L (ref 4–11)

## 2020-08-23 PROCEDURE — 80053 COMPREHEN METABOLIC PANEL: CPT | Performed by: EMERGENCY MEDICINE

## 2020-08-23 PROCEDURE — 25000132 ZZH RX MED GY IP 250 OP 250 PS 637

## 2020-08-23 PROCEDURE — 99220 ZZC INITIAL OBSERVATION CARE,LEVL III: CPT | Performed by: INTERNAL MEDICINE

## 2020-08-23 PROCEDURE — 84484 ASSAY OF TROPONIN QUANT: CPT | Performed by: INTERNAL MEDICINE

## 2020-08-23 PROCEDURE — 93306 TTE W/DOPPLER COMPLETE: CPT

## 2020-08-23 PROCEDURE — 85025 COMPLETE CBC W/AUTO DIFF WBC: CPT | Performed by: EMERGENCY MEDICINE

## 2020-08-23 PROCEDURE — 93306 TTE W/DOPPLER COMPLETE: CPT | Mod: 26 | Performed by: INTERNAL MEDICINE

## 2020-08-23 PROCEDURE — 36415 COLL VENOUS BLD VENIPUNCTURE: CPT | Performed by: INTERNAL MEDICINE

## 2020-08-23 PROCEDURE — C9803 HOPD COVID-19 SPEC COLLECT: HCPCS

## 2020-08-23 PROCEDURE — 99285 EMERGENCY DEPT VISIT HI MDM: CPT | Mod: 25

## 2020-08-23 PROCEDURE — G0378 HOSPITAL OBSERVATION PER HR: HCPCS

## 2020-08-23 PROCEDURE — 25800030 ZZH RX IP 258 OP 636: Performed by: EMERGENCY MEDICINE

## 2020-08-23 PROCEDURE — 93005 ELECTROCARDIOGRAM TRACING: CPT

## 2020-08-23 PROCEDURE — 71046 X-RAY EXAM CHEST 2 VIEWS: CPT

## 2020-08-23 PROCEDURE — 84484 ASSAY OF TROPONIN QUANT: CPT | Mod: 91 | Performed by: EMERGENCY MEDICINE

## 2020-08-23 PROCEDURE — 96360 HYDRATION IV INFUSION INIT: CPT

## 2020-08-23 PROCEDURE — 99207 ZZC CDG-CODE CATEGORY CHANGED: CPT | Performed by: INTERNAL MEDICINE

## 2020-08-23 PROCEDURE — U0003 INFECTIOUS AGENT DETECTION BY NUCLEIC ACID (DNA OR RNA); SEVERE ACUTE RESPIRATORY SYNDROME CORONAVIRUS 2 (SARS-COV-2) (CORONAVIRUS DISEASE [COVID-19]), AMPLIFIED PROBE TECHNIQUE, MAKING USE OF HIGH THROUGHPUT TECHNOLOGIES AS DESCRIBED BY CMS-2020-01-R: HCPCS | Performed by: EMERGENCY MEDICINE

## 2020-08-23 RX ORDER — BISACODYL 10 MG
10 SUPPOSITORY, RECTAL RECTAL DAILY PRN
Status: DISCONTINUED | OUTPATIENT
Start: 2020-08-23 | End: 2020-08-25 | Stop reason: HOSPADM

## 2020-08-23 RX ORDER — PROCHLORPERAZINE 25 MG
12.5 SUPPOSITORY, RECTAL RECTAL EVERY 12 HOURS PRN
Status: DISCONTINUED | OUTPATIENT
Start: 2020-08-23 | End: 2020-08-25 | Stop reason: HOSPADM

## 2020-08-23 RX ORDER — AMOXICILLIN 250 MG
1 CAPSULE ORAL 2 TIMES DAILY PRN
Status: DISCONTINUED | OUTPATIENT
Start: 2020-08-23 | End: 2020-08-25 | Stop reason: HOSPADM

## 2020-08-23 RX ORDER — ACETAMINOPHEN 325 MG/1
650 TABLET ORAL EVERY 4 HOURS PRN
Status: DISCONTINUED | OUTPATIENT
Start: 2020-08-23 | End: 2020-08-25 | Stop reason: HOSPADM

## 2020-08-23 RX ORDER — SODIUM CHLORIDE 9 MG/ML
INJECTION, SOLUTION INTRAVENOUS CONTINUOUS
Status: DISCONTINUED | OUTPATIENT
Start: 2020-08-23 | End: 2020-08-25

## 2020-08-23 RX ORDER — ONDANSETRON 4 MG/1
4 TABLET, ORALLY DISINTEGRATING ORAL EVERY 6 HOURS PRN
Status: DISCONTINUED | OUTPATIENT
Start: 2020-08-23 | End: 2020-08-25 | Stop reason: HOSPADM

## 2020-08-23 RX ORDER — ONDANSETRON 2 MG/ML
4 INJECTION INTRAMUSCULAR; INTRAVENOUS EVERY 6 HOURS PRN
Status: DISCONTINUED | OUTPATIENT
Start: 2020-08-23 | End: 2020-08-25 | Stop reason: HOSPADM

## 2020-08-23 RX ORDER — PROCHLORPERAZINE MALEATE 5 MG
5 TABLET ORAL EVERY 6 HOURS PRN
Status: DISCONTINUED | OUTPATIENT
Start: 2020-08-23 | End: 2020-08-25 | Stop reason: HOSPADM

## 2020-08-23 RX ORDER — NALOXONE HYDROCHLORIDE 0.4 MG/ML
.1-.4 INJECTION, SOLUTION INTRAMUSCULAR; INTRAVENOUS; SUBCUTANEOUS
Status: DISCONTINUED | OUTPATIENT
Start: 2020-08-23 | End: 2020-08-25 | Stop reason: HOSPADM

## 2020-08-23 RX ORDER — FLUTICASONE PROPIONATE AND SALMETEROL XINAFOATE 230; 21 UG/1; UG/1
2 AEROSOL, METERED RESPIRATORY (INHALATION) 2 TIMES DAILY
Status: DISCONTINUED | OUTPATIENT
Start: 2020-08-23 | End: 2020-08-23 | Stop reason: CLARIF

## 2020-08-23 RX ORDER — POLYETHYLENE GLYCOL 3350 17 G/17G
17 POWDER, FOR SOLUTION ORAL DAILY PRN
Status: DISCONTINUED | OUTPATIENT
Start: 2020-08-23 | End: 2020-08-25 | Stop reason: HOSPADM

## 2020-08-23 RX ORDER — AMOXICILLIN 250 MG
2 CAPSULE ORAL 2 TIMES DAILY PRN
Status: DISCONTINUED | OUTPATIENT
Start: 2020-08-23 | End: 2020-08-25 | Stop reason: HOSPADM

## 2020-08-23 RX ORDER — ACETAMINOPHEN 650 MG/1
650 SUPPOSITORY RECTAL EVERY 4 HOURS PRN
Status: DISCONTINUED | OUTPATIENT
Start: 2020-08-23 | End: 2020-08-25 | Stop reason: HOSPADM

## 2020-08-23 RX ORDER — ALBUTEROL SULFATE 90 UG/1
2 AEROSOL, METERED RESPIRATORY (INHALATION) EVERY 4 HOURS PRN
Status: DISCONTINUED | OUTPATIENT
Start: 2020-08-23 | End: 2020-08-25 | Stop reason: HOSPADM

## 2020-08-23 RX ORDER — ALBUTEROL SULFATE 0.83 MG/ML
2.5 SOLUTION RESPIRATORY (INHALATION) EVERY 6 HOURS PRN
Status: DISCONTINUED | OUTPATIENT
Start: 2020-08-23 | End: 2020-08-25 | Stop reason: HOSPADM

## 2020-08-23 RX ADMIN — FLUTICASONE FUROATE AND VILANTEROL TRIFENATATE 1 PUFF: 200; 25 POWDER RESPIRATORY (INHALATION) at 18:30

## 2020-08-23 RX ADMIN — SODIUM CHLORIDE 1000 ML: 9 INJECTION, SOLUTION INTRAVENOUS at 10:23

## 2020-08-23 ASSESSMENT — ENCOUNTER SYMPTOMS
HEADACHES: 1
DIZZINESS: 1
VOMITING: 0
NUMBNESS: 0
FEVER: 0
SHORTNESS OF BREATH: 0
DIAPHORESIS: 1
PALPITATIONS: 0
NAUSEA: 1

## 2020-08-23 NOTE — ED NOTES
"[facility]  ED Nurse Handoff Report    ED Chief complaint: Loss of Consciousness      ED Diagnosis:   Final diagnoses:   Syncope, unspecified syncope type       Code Status: Full Code    Allergies:   Allergies   Allergen Reactions     Alendronate Sodium      Worse acid reflux     Food      Meat, eggs, dairy       Patient Story: pt was at home and called daughter because she states she \"passes out\" is not sure for how long.   Focused Assessment:  Pt does not speak english. Daughter is translating.     Treatments and/or interventions provided: see mar  Patient's response to treatments and/or interventions:     To be done/followed up on inpatient unit:      Does this patient have any cognitive concerns?: no    Activity level - Baseline/Home:  Independent  Activity Level - Current:   Independent    Patient's Preferred language: Irish   Needed?: Yes    Isolation: None  Infection: Not Applicable  Bariatric?: No    Vital Signs:   Vitals:    08/23/20 1000   BP: (!) 145/90   Resp: 16   Temp: 98.5  F (36.9  C)   TempSrc: Oral   SpO2: 97%       Cardiac Rhythm:     Was the PSS-3 completed:   Yes  What interventions are required if any?               Family Comments: daughter is bedside and interpreting.   OBS brochure/video discussed/provided to patient/family: Yes              Name of person given brochure if not patient:               Relationship to patient:     For the majority of the shift this patient's behavior was Green.   Behavioral interventions performed were .    ED NURSE PHONE NUMBER: [phone]         "

## 2020-08-23 NOTE — ED TRIAGE NOTES
Pt was at home and pt daughter states she got a call from pt that she passed out. Doesn't know for how long. Pt states she has a light headache at the moment. She feels a spinning sensation. woke up sweaty.

## 2020-08-23 NOTE — ED PROVIDER NOTES
History     Chief Complaint:  Loss of Consciousness    HPI   HPI limited secondary to language barrier. HPI provided through EMS personnel and patient's daughter.      Wilma Lorenzana is a 79 year old female, with a history of aortic valve sclerosis, COPD, asthma, pulmonary hypertension, and hypertension, who presents with her daughter to the ED for evaluation of loss of consciousness. Per EMS, the patient had a syncopal episode while sitting today. Patient was awake with arrival of EMS. Patient's blood pressure was 170/98 with a blood glucose of 100 for EMS and she had a normal 12 lead. She has a 18 gauge in her right AC. Upon evaluation, the patient's daughter reports the patient noted she woke up diaphoretic but otherwise feeling fine. She had a syncopal episode for a few minutes while sitting. No injuries. The patient called and notified her daughter afterwards. Patient states she has a spinning sensation with nausea and a mild headache, which started prior to the syncopal event. No numbness, tingling, vomiting, shortness of breath, palpitations, chest pain, or fever. Daughter notes the patient resides with her.     Allergies:  Alendronate: worsening acid reflux      Medications:    Albuterol neb  Albuterol inhaler  Vitamin D  Iron  Advair inhaler  Boniva  Losartan  Metoprolol   Thera-vit-m   Omeprazole  Tramadol     Past Medical History:    Aortic valve sclerosis   Chronic pain syndrome  COPD  Lumbar/lumbosacral intervertebral disc degeneration   GERD  H pylori  Hypertension   Anemia, iron deficiency  Asthma   Pulmonary nodules   Mitral valve stenosis   Myelomalacia  Osteoporosis  Pulmonary hypertension    Scoliosis  Strabismus/amblyopia   Cataracts    Past Surgical History:    Cervical laminectomy     Family History:    History reviewed. No pertinent family history.     Social History:  Smoking status: Never smoker    Substance use: No  Alcohol use: No  Presents to ED with daughter    Marital Status:   Single [1]     Review of Systems   Constitutional: Positive for diaphoresis. Negative for fever.   Respiratory: Negative for shortness of breath.    Cardiovascular: Negative for chest pain and palpitations.   Gastrointestinal: Positive for nausea. Negative for vomiting.   Neurological: Positive for dizziness, syncope and headaches. Negative for numbness.   All other systems reviewed and are negative.    Physical Exam     Orthostatics:  Lying: BP- 140/86, Rate- 93 bmp  Sitting: BP- 152/86, Rate- 98 bmp  Standing: BP- 167/81, Rate- 104 bmp    Patient Vitals for the past 24 hrs:   BP Temp Temp src Resp SpO2   08/23/20 1000 (!) 145/90 98.5  F (36.9  C) Oral 16 97 %     Physical Exam  Nursing note and vitals reviewed.  Constitutional:  Oriented to person, place, and time. Cooperative.   HENT:   Nose:    Nose normal.   Mouth/Throat:   Mucous membranes are normal.   Eyes:    Conjunctivae normal and EOM are normal.      Pupils are equal, round, and reactive to light.   Neck:    Trachea normal.   Cardiovascular:  Normal rate, regular rhythm, normal heart sounds and normal pulses. No murmur heard.  Pulmonary/Chest:  Effort normal and breath sounds normal.   Abdominal:   Soft. Normal appearance and bowel sounds are normal.      There is no tenderness.      There is no rebound and no CVA tenderness.   Musculoskeletal:  Extremities atraumatic x 4.   Lymphadenopathy:  No cervical adenopathy.   Neurological:   Alert and oriented to person, place, and time. Normal strength.      No cranial nerve deficit or sensory deficit. GCS eye subscore is 4. GCS verbal subscore is 5. GCS motor subscore is 6.   Skin:    Skin is intact. No rash noted.   Psychiatric:   Normal mood and affect.    Emergency Department Course     ECG (9:47:30):  Rate 90 bpm. SD interval 264. QRS duration 88. QT/QTc 360/440. P-R-T axes 69 23 72. Sinus rhythm with 1st degree AV block. Possible left atrial enlargement. Borderline ECG. No significant change compared to  EKG dated 9/14/17. Interpreted at 0956 by Roberto Davies MD.    Imaging:  Radiographic findings were communicated with the patient and family who voiced understanding of the findings.    XR Chest 2 Views  Impression: Hyperinflation. No focal infiltrate or consolidation.   Minimal linear atelectasis left lung base. No evidence for   pneumothorax. Normal heart size. Normal pulmonary vascularity.   Thoracic kyphosis with degenerative changes of the spine. Marked   degenerative changes both shoulders. As read by Radiology.     Laboratory:  Laboratory findings were communicated with the patient and family who voiced understanding of the findings.    Asymptomatic COVID-19 Virus (Coronavirus) PCR: In process     Troponin I (0945): <0.015    CBC: WBC 3.0(L), HGB 11.5(L), o/w WNL ()  CMP: o/w WNL (Creatinine 0.64)     Interventions:  1023: NS 1L Bolus IV    Emergency Department Course:  0930: Patient arrived by EMS. I performed an exam of the patient as documented above.     Past medical records, nursing notes, and vitals reviewed.    0945: IV was inserted and blood was drawn for laboratory testing, results above.    0947: EKG obtained in the ED, see results above.     The patient was sent for a chest x-ray while in the emergency department, results above.     1036: I rechecked the patient and discussed the results of her workup thus far.     1055: I spoke to Dr. Anderson of the hospitalist service who accepts the patient for admission.     Patient provided an asymptomatic COVID-19 swab.    Findings and plan explained to the patient and daughter who consents to admission. Discussed the patient with Dr. Anderson, who will admit the patient to an obs bed for further monitoring, evaluation, and treatment.    I personally reviewed the laboratory results with the patient and daughter and answered all related questions prior to admission.     Impression & Plan      Covid-19  Wilma Loernzana was evaluated during a  global COVID-19 pandemic, which necessitated consideration that the patient might be at risk for infection with the SARS-CoV-2 virus that causes COVID-19.   Applicable protocols for evaluation were followed during the patient's care. COVID-19 was considered as part of the patient's evaluation. The plan for testing is:  a test was obtained during this visit.    Medical Decision Making:  This is a 79-year-old female brought in by EMS after she had an apparent syncopal episode.  This happened while she was sitting down now, which is a little more concerning.  I proceeded with the above work-up including the blood work, EKG, and chest x-ray.  She was also provided IV fluids.  Her symptoms of the mild headache and dizziness went away after arrival here.  I think it is best that she be brought into the hospital for further evaluation and management.  I subsequently spoke with Dr. Anderson, who will be taking care of the patient.    Diagnosis:    ICD-10-CM    1. Syncope, unspecified syncope type  R55      Disposition: Patient admitted to an obs bed by Dr. Justin Barrow  8/23/2020    EMERGENCY DEPARTMENT    Scribe Disclosure:  Tess YOON, am serving as a scribe at 9:30 AM on 8/23/2020 to document services personally performed by Roberto Davies MD based on my observations and the provider's statements to me.          Roberto Davies MD  08/23/20 [id number]

## 2020-08-23 NOTE — PROGRESS NOTES
RECEIVING UNIT ED HANDOFF REVIEW    ED Nurse Handoff Report was reviewed by: Meagan Noel RN on August 23, 2020 at 12:24 PM

## 2020-08-23 NOTE — PROGRESS NOTES
Paged MD for recorded SVT with associated dizziness when pt was up to bathroom. Orthostatics checked- normal. MD reviewed. No current changes. Monitor.

## 2020-08-23 NOTE — PROGRESS NOTES
Admission    Patient arrives to room 615-2 via cart from ED  Care plan note: A&Ox4, Amheric speaking, no English. VSS on RA. SBA. Reg diet    Inpatient nursing criteria listed below were met:    PCD's Documented: NA  Skin issues/needs documented :NA  Isolation education started/completed NA  Patient allergies verified with patient: NA  Verified completion of Bledsoe Risk Assessment Tool:  Yes  Verified completion of Guardianship screening tool: No  Fall Prevention: Care plan updated, Education given and documented NA  Care Plan initiated: Yes  Home medications documented in belongings flowsheet: NA  Patient belongings documented in belongings flowsheet: Yes  Reminder note (belongings/ medications) placed in discharge instructions:NA  Admission profile/ required documentation complete: NA  Bedside Report Letter given and explained to patient NA

## 2020-08-23 NOTE — ED NOTES
Bed: ED24  Expected date: 8/23/20  Expected time: 9:19 AM  Means of arrival: Ambulance  Comments:  Jose 79f syncope ETA 0984

## 2020-08-23 NOTE — H&P
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  Patient now presents with episode of syncope and preceding dizziness.  Patient with a history of mitral stenosis, MR 2+ and mild left ventricular outflow obstruction.  Patient has been fasting for 2 weeks.    Principal Problem:    Syncope  Aortic valve disorder backslash mild aortic stenosis    Mitral valve insufficiency, unspecified etiology    Pulmonary hypertension (H)     Mitral valve stenosis, unspecified etiology  History of mild left ventricular outflow tract obstruction    Assessment: Followed by [facility] [place] cardiology.  Echocardiogram as detailed in the HPI showed moderate mitral stenosis with mean gradient of 8 mmHg.  2+ MR, 1-2+ TR, moderate pulmonary hypertension.  2+ pulmonic valve regurgitation.  -Currently on losartan.  She is not on Toprol-XL apparently.  Losartan may be a new medication for her in the last several months.  -Now presents with an episode of syncope with preceding short prodrome of vertigo-like dizziness.  -Received 1 L normal saline in the emergency room.  She appears euvolemic.  Normal orthostatic blood pressures after 1 L of normal saline.  -Mitral regurgitation apparent on exam.  No clear diastolic murmur.  -She has been fasting for 2 weeks.  Normal labs.  -Syncope may in part be related to her mitral stenosis and possibly dynamic left ventricular outflow obstruction.  -Suspect her syncope is type factorial secondary to her fasting state, losartan, and possibly related to her underlying valve disease and possible left ventricular outflow tract obstruction progression.  Doubt  primary neurologic issue.  Her dizziness has a vertiginous quality to it but I doubt she has a primary neurologic issue prompting her vertigo.    Plan: No further fluids needed.  Telemetry.  A.m. labs.  Cardiology consult, echocardiogram with dynamic evaluation with Valsalva and standing to evaluate her left ventricular outflow tract to determine if it has changed.  Serial troponins, registered observation,  May need close follow-up with cardiology.  She may need to limit her fasting in the future for [employer] holidays.      HTN (hypertension)    Assessment: She is on losartan 25 mg daily.  She does not take the Toprol-XL 50 mg daily listed on her medications.  Her daughter sets up her medications.  She lives with her daughter.    Plan: Patient had her losartan today.  Will hold on dose for tomorrow until we see the results of her echocardiogram.      COPD, moderate (H)    Assessment: She is on Advair.  Lungs clear.  Chest x-ray negative.  No pulmonary complaints.    Plan: Restart Advair.  PRN albuterol.        Hx Palpitations    Assessment: CO patch from January 2020 was unremarkable showed several short bursts of SVT.  No atrial fibrillation.  Patient denies palpitations at this time.  EKG shows first-degree AV block.  No change from previous studies.   Plan: Telemetry.    Thrombocytopenia (H)    Assessment: Normal count today.  Essentially stable from baseline.  No bleeding      Neutropenia, unspecified type (H)    Assessment: Her white blood cell count is stable.  She is at her baseline.  No infectious symptoms.    Plan: A.m. CBC  DVT Prophylaxis: Patient is ambulatory,  Code Status: Full Code    Disposition: Expected discharge tomorrow once her evaluation is been completed.    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    Patient states she was in her usual state of health until today while on the floor praying she developed a spinning-like sensation and shortly thereafter passed out while sitting on the floor while she was praying.  She felt like the room was spinning.  This occurred for several seconds and was not prolonged prior to her losing consciousness.  She feels the loss of consciousness lasted several minutes.  She did not bite her tongue or have any urinary incontinence.  She woke up and called her daughter who came over to help her.  She lives with her daughter.  Patient took her losartan this morning.  Patient has been fasting for the last 2 weeks for [employer] reasons.  She is able to drink fluids and eat after 3 PM.  She has been consistent with the eating and drinking in the afternoons.  Her fasting ended yesterday.  She has had dizziness and possibly a passing out episode about 4 years ago while she was fasting.  Patient denies having had any dizziness or vertigo-like sensation while fasting over the last 2 weeks.    She has been followed by Dr. controls her of [facility] cardiology.  Last seen January 2020.  She had noted some palpitations at that visit apparently.  She had a Ziehl patch done to rule out tachyarrhythmias or atrial fibrillation given her mitral stenosis.  This showed only 3 episodes of SVT that were brief.  Cardiogram at that time showed an EF of 65 to 70%.  Mild left ventricular outflow tract obstruction.  Moderate mitral stenosis with mean gradient of 8 mm per mercury and 2+ MR.  1-2+ TR.  Moderate pulmonary hypertension.      Patient denies any hearing loss, tinnitus, ear pain or ear fullness.  No fevers, chills, myalgias, headache, paresthesias,  change in vision or speech.  No focal weakness or paresthesias.  No cough or shortness of breath.  No chest pain or palpitations.  No nausea vomiting or diarrhea.  No bleeding.  No hematuria or bloody stools.  She is felt well otherwise.    Patient had her antihypertensives changed to losartan several months ago.  She is not taking Toprol-XL.      In the emergency room patient is afebrile.  Normotensive.  Normal orthostatic blood pressure and pulse.  Normal oxygen saturations.  Normal exam.  Nonfocal neuro exam.  Amari studies notable for normal BMP, LFTs, troponin.  Blood sugar 87.  White blood cell count 3 and stable from baseline.  Hemoglobin 11.5 which is her baseline.    Chest x-ray shows no acute findings..  Linear atelectasis left base.  No acute findings.    EKG shows normal sinus rhythm, first-degree AV block.  Remainder of intervals are normal.  No acute ST-T wave changes.  No change from prior study.    In the emergency room she was given 1 L normal saline bolus.  Her orthostatic blood pressures were checked after the liter bolus was given.    Past Medical History    I have reviewed this patient's medical history and updated it with pertinent information if needed.   Past Medical History:   Diagnosis Date     ABNORMAL ECHO 5/04 5/30/2004    Mild-mod MV Ca++, Mild MR, Mild-Mod TR, Mild-Mod WA, Mild-Mod PHTN, Mod AV sclerosis, Mild EAD L>R, EF normal 70%      Aortic valve disorder 5/30/2004    Echo, rec repeat 2 yrs  Problem list name updated by automated process. Provider to review     AORTIC VALVE SCLEROSIS 5/30/2004    needs ABX prophylaxis     Chronic pain syndrome 3/16/2018    Patient is followed by Lavelle Aj MD, MD for ongoing prescription of pain medication.  All refills should only be approved by this provider, or covering partner.  Medication(s): Tramadol.  Maximum quantity per month: 60 Clinic visit frequency required: Q 6  months   Controlled substance agreement: Encounter-Level  CSA:   There are no encounter-level csa.   Pain Clinic evaluation in the     COMPRESSION FRX T5      COPD, moderate (H) 11/1/2015     Degeneration of lumbar or lumbosacral intervertebral disc 11/04    L5-S1 mild-mod,L3-4/L4-5 mod. bilat foraminal stenosis , L3-4/ L4-5, mod- sev. cent stenosis     Esophageal reflux 8/9/2007     HELICOBACTER PYLORI INFECTION 11/1/2005     HTN (hypertension) 6/7/2013     Iron deficiency anemia, unspecified iron deficiency anemia type 6/1/2018     JOINT PAIN BILATERAL THUMBS 2/9/2004     L SHOULDER PAIN 2/9/2004     Mild persistent asthma      Mitral valve insufficiency, unspecified etiology 6/21/2017     Myelomalacia (H) 2/2011     NECK PAIN, HX CERVICAL SURGERY 2/9/2004     Osteoporosis, unspecified 2/04     POSTLAMINECTOMY STATUS-CERV      Pulmonary hypertension (H) 6/21/2017     SCOLIOSIS      Strabismic amblyopia        Past Surgical History   I have reviewed this patient's surgical history and updated it with pertinent information if needed.  Past Surgical History:   Procedure Laterality Date     C DEXA, BONE DENSITY, AXIAL SKEL  2/04    L1-L4 -4.9, L 4- 5.6,FemNk L-3.5,R-3.4, ProxiFArm: -4.4,DistForearm:  -5.1     C FLUOROSCOPIC GUIDE & LOCALIZATION, NEEDLE/CATHETER TIP, SPINE/PARASPINE DX/THERAPEUTIC INJECTION  5/05    lumbar     C LAMINECTOMY,>2 SGMT,CERVICAL  ~1996?     laminectomy C3-7 with laminoplasty     HC CT ABDOMEN WO&W CONTRAST  11/2006    Slight prominence/tortuosity pancreatic duct , 2 small retroperitoneal areas with increased density (stable)     HC MRI CERVICAL SPINE W/O CONTRAST       HC MRI LUMBAR SPINE W/O CONTRAST  10/04    L5-S1 mild-mod,L3-4/L4-5 mod. bilat foraminal stenosis , L3-4/ L4-5, mod- sev. cent stenosis       Prior to Admission Medications   Prior to Admission Medications   Prescriptions Last Dose Informant Patient Reported? Taking?   Cholecalciferol (VITAMIN D) 2000 units tablet   No No   Sig: Take 2,000 Units by mouth daily   IBANdronate (BONIVA)  150 MG tablet   No No   Sig: TAKE 1 TABLET BY MOUTH EVERY 30 DAYS 1 HR BEFORE BREAKFAST WITH 8OZ OF WATER AND SIT UPRIGHT 30 MIN   ORDER FOR DME   No No   Si blood pressure cuff, automatic digital reading for home use   albuterol (PROVENTIL) (2.5 MG/3ML) 0.083% neb solution   Yes No   Sig: Take 2.5 mg by nebulization every 6 hours as needed for shortness of breath / dyspnea or wheezing   albuterol (VENTOLIN HFA) 108 (90 Base) MCG/ACT inhaler   No No   Sig: Inhale 2 puffs into the lungs every 4 hours as needed for shortness of breath / dyspnea or wheezing   diclofenac (VOLTAREN) 1 % topical gel   No No   Sig: Place 4 g onto the skin 4 times daily USE FOR KNEES   ferrous sulfate (IRON) 325 (65 Fe) MG tablet   No No   Sig: Take 1 tablet (325 mg) by mouth daily (with breakfast)   fluticasone-salmeterol (ADVAIR-HFA) 230-21 MCG/ACT inhaler   No No   Sig: Inhale 2 puffs into the lungs 2 times daily   guaiFENesin-codeine (ROBITUSSIN AC) 100-10 MG/5ML solution   No No   Sig: Take 5 mLs by mouth every 4 hours as needed for cough   losartan (COZAAR) 25 MG tablet   No No   Sig: Take 1 tablet (25 mg) by mouth daily   metoprolol succinate ER (TOPROL-XL) 50 MG 24 hr tablet   No No   Sig: Take 1 tablet (50 mg) by mouth daily   multivitamin, therapeutic with minerals (THERA-VIT-M) TABS  Daughter Yes No   Sig: Take 1 tablet by mouth daily   omeprazole (PRILOSEC) 20 MG DR capsule   No No   Sig: TAKE 1 CAPSULE (20 MG) BY MOUTH DAILY   order for DME   No No   Sig: Equipment being ordered: TENS   order for DME   No No   Sig: Equipment being ordered:     Ensure nutritional supplement - 1/day   order for DME   No No   Sig: Equipment being ordered: peak flow meter   senna-docusate (SENOKOT-S/PERICOLACE) 8.6-50 MG tablet   No No   Sig: Take 1 tablet by mouth 2 times daily   traMADol (ULTRAM) 50 MG tablet   No No   Sig: TAKE 1 TABLET BY MOUTH EVERY 6 HOURS AS NEEDED FOR SEVERE PAIN      Facility-Administered Medications: None      Allergies   Allergies   Allergen Reactions     Alendronate Sodium      Worse acid reflux     Food      Meat, eggs, dairy       Social History   I have reviewed this patient's social history and updated it with pertinent information if needed. Wilma Lorenzana  reports that she has never smoked. She has never used smokeless tobacco. She reports that she does not drink alcohol or use drugs.    Family History   I have reviewed this patient's family history and updated it with pertinent information if needed.   Family History   Problem Relation Age of Onset     Unknown/Adopted Father      Unknown/Adopted Mother        Review of Systems   The 10 point Review of Systems is negative other than noted in the HPI or here.     Physical Exam   Temp: 97.7  F (36.5  C) Temp src: Oral BP: 126/72 Pulse: 98   Resp: 16 SpO2: 96 % O2 Device: None (Room air)    Vital Signs with Ranges  Temp:  [97.7  F (36.5  C)-98.5  F (36.9  C)] 97.7  F (36.5  C)  Pulse:  [95-98] 98  Resp:  [16] 16  BP: (126-145)/(69-90) 126/72  SpO2:  [96 %-99 %] 96 %  0 lbs 0 oz    Constitutional: Acute distress, laying in bed, nontoxic appearing  Eyes: Pupils equal round reactive to light and accommodating, extraocular eye motions intact, right cataract, no nystagmus  HEENT: Normal oropharynx  Respiratory: Clear to auscultation bilaterally  Cardiovascular: Regular rate and rhythm no rubs or gallops.  She has a holosystolic murmur at the apex.  GI: Soft nontender nondistended  Lymph/Hematologic: No axillary cervical lymphadenopathy  Skin: No rash or edema  Musculoskeletal: No focal joint swelling or redness.  Neurologic: Cranial nerves II through XII grossly intact, strength 5-5 in extremities x4, normal speech and mentation.  Toes downgoing bilaterally.  Normal finger-nose-finger testing.  Psychiatric: Normal affect.  Pleasant and cooperative    Data   Data reviewed today:  I personally reviewed labs and ekg from ed.   EKG shows normal sinus rhythm,  first-degree AV block, J-point elevation precordial leads.  No change from previous study.  Normal intervals.  Recent Labs   Lab 08/23/20  0945   WBC 3.0*   HGB 11.5*   MCV 97         POTASSIUM 4.0   CHLORIDE 106   CO2 29   BUN 16   CR 0.64   ANIONGAP 3   DORY 8.7   GLC 87   ALBUMIN 3.8   PROTTOTAL 8.1   BILITOTAL 0.4   ALKPHOS 74   ALT 16   AST 13   TROPI <0.015       Imaging:  Recent Results (from the past 24 hour(s))   XR Chest 2 Views    Narrative    CHEST TWO VIEWS 8/23/2020 10:10 AM     HISTORY: Syncope    COMPARISON: 9/14/2018, chest CT 6/29/2020       Impression    Impression: Hyperinflation. No focal infiltrate or consolidation.  Minimal linear atelectasis left lung base. No evidence for  pneumothorax. Normal heart size. Normal pulmonary vascularity.  Thoracic kyphosis with degenerative changes of the spine. Marked  degenerative changes both shoulders.    CURT L BEHRNS, MD

## 2020-08-24 LAB
ANION GAP SERPL CALCULATED.3IONS-SCNC: 4 MMOL/L (ref 3–14)
BUN SERPL-MCNC: 9 MG/DL (ref 7–30)
CALCIUM SERPL-MCNC: 8.8 MG/DL (ref 8.5–10.1)
CHLORIDE SERPL-SCNC: 107 MMOL/L (ref 94–109)
CO2 SERPL-SCNC: 29 MMOL/L (ref 20–32)
CREAT SERPL-MCNC: 0.57 MG/DL (ref 0.52–1.04)
ERYTHROCYTE [DISTWIDTH] IN BLOOD BY AUTOMATED COUNT: 13.3 % (ref 10–15)
GFR SERPL CREATININE-BSD FRML MDRD: 89 ML/MIN/{1.73_M2}
GLUCOSE SERPL-MCNC: 125 MG/DL (ref 70–99)
HCT VFR BLD AUTO: 38.3 % (ref 35–47)
HGB BLD-MCNC: 12 G/DL (ref 11.7–15.7)
MCH RBC QN AUTO: 30.8 PG (ref 26.5–33)
MCHC RBC AUTO-ENTMCNC: 31.3 G/DL (ref 31.5–36.5)
MCV RBC AUTO: 98 FL (ref 78–100)
PLATELET # BLD AUTO: 171 10E9/L (ref 150–450)
POTASSIUM SERPL-SCNC: 3.9 MMOL/L (ref 3.4–5.3)
RBC # BLD AUTO: 3.9 10E12/L (ref 3.8–5.2)
SARS-COV-2 RNA SPEC QL NAA+PROBE: NOT DETECTED
SODIUM SERPL-SCNC: 140 MMOL/L (ref 133–144)
SPECIMEN SOURCE: NORMAL
WBC # BLD AUTO: 2.6 10E9/L (ref 4–11)

## 2020-08-24 PROCEDURE — 99225 ZZC SUBSEQUENT OBSERVATION CARE,LEVEL II: CPT | Performed by: INTERNAL MEDICINE

## 2020-08-24 PROCEDURE — 80048 BASIC METABOLIC PNL TOTAL CA: CPT | Performed by: INTERNAL MEDICINE

## 2020-08-24 PROCEDURE — G0378 HOSPITAL OBSERVATION PER HR: HCPCS

## 2020-08-24 PROCEDURE — 36415 COLL VENOUS BLD VENIPUNCTURE: CPT | Performed by: INTERNAL MEDICINE

## 2020-08-24 PROCEDURE — 25000132 ZZH RX MED GY IP 250 OP 250 PS 637: Performed by: INTERNAL MEDICINE

## 2020-08-24 PROCEDURE — 85027 COMPLETE CBC AUTOMATED: CPT | Performed by: INTERNAL MEDICINE

## 2020-08-24 RX ORDER — IBUPROFEN 200 MG
TABLET ORAL PRN
Status: ON HOLD | COMMUNITY
End: 2020-08-25

## 2020-08-24 RX ORDER — ACETAMINOPHEN 500 MG
TABLET ORAL PRN
COMMUNITY

## 2020-08-24 RX ORDER — MECLIZINE HCL 12.5 MG 12.5 MG/1
12.5 TABLET ORAL 3 TIMES DAILY PRN
Status: DISCONTINUED | OUTPATIENT
Start: 2020-08-24 | End: 2020-08-25 | Stop reason: HOSPADM

## 2020-08-24 RX ADMIN — ACETAMINOPHEN 650 MG: 325 TABLET ORAL at 07:27

## 2020-08-24 RX ADMIN — MECLIZINE 12.5 MG: 12.5 TABLET ORAL at 23:56

## 2020-08-24 RX ADMIN — FLUTICASONE FUROATE AND VILANTEROL TRIFENATATE 1 PUFF: 200; 25 POWDER RESPIRATORY (INHALATION) at 08:42

## 2020-08-24 NOTE — CONSULTS
[facility]    Cardiology Consultation     Date of Admission:  8/23/2020    Primary Care Physician   Lavelle Aj MD     Consult Date:  08/23/2020      REASON FOR CONSULTATION:  Syncope.      REFERRING PHYSICIAN:  Dr. David Anderson.      IMPRESSION:   1.  Vertigo, patient denies syncope.   2.  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  Normal left ventricular systolic function.     3.  Asthma.      This delightful lady presents with vertigo.  She denies syncope.  I think this may be either due to vestibular neuronitis or benign positional vertigo.  EKG does not show any acute changes.  A recent Zio Patch monitor showed runs of PSVT, which could certainly be a normal finding in the elderly.      We will sign off at this time.  She has continued followup in our clinic.      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  She was seen just recently by my colleague, Dr. Hermilo Holm.  She was stable at that time.      This lady has no history of previous syncope or dizzy spells.  She does have asthma and is occasionally short of breath; however, she denies any shortness of breath or chest pains prior to admission.  In fact, her health has been good prior to admission.      She woke up this morning and she felt the room was spinning around.  She went and had a little breakfast and she drank some holy water.  She then went back and laid down on her bed and a spinning sensation continued.  She did not fall and she tells me that she did not lose consciousness at all.  She denies nausea and vomiting.  No recent fevers or other bowel or urinary disturbances.     Past Medical History   I have reviewed this patient's medical history and updated  it with pertinent information if needed.   Past Medical History:   Diagnosis Date     ABNORMAL ECHO 5/04 5/30/2004    Mild-mod MV Ca++, Mild MR, Mild-Mod TR, Mild-Mod ND, Mild-Mod PHTN, Mod AV sclerosis, Mild EAD L>R, EF normal 70%      Aortic valve disorder 5/30/2004    Echo, rec repeat 2 yrs  Problem list name updated by automated process. Provider to review     AORTIC VALVE SCLEROSIS 5/30/2004    needs ABX prophylaxis     Chronic pain syndrome 3/16/2018    Patient is followed by Lavelle Aj MD, MD for ongoing prescription of pain medication.  All refills should only be approved by this provider, or covering partner.  Medication(s): Tramadol.  Maximum quantity per month: 60 Clinic visit frequency required: Q 6  months   Controlled substance agreement: Encounter-Level CSA:   There are no encounter-level csa.   Pain Clinic evaluation in the     COMPRESSION FRX T5      COPD, moderate (H) 11/1/2015     Degeneration of lumbar or lumbosacral intervertebral disc 11/04    L5-S1 mild-mod,L3-4/L4-5 mod. bilat foraminal stenosis , L3-4/ L4-5, mod- sev. cent stenosis     Esophageal reflux 8/9/2007     HELICOBACTER PYLORI INFECTION 11/1/2005     HTN (hypertension) 6/7/2013     Iron deficiency anemia, unspecified iron deficiency anemia type 6/1/2018     JOINT PAIN BILATERAL THUMBS 2/9/2004     L SHOULDER PAIN 2/9/2004     Mild persistent asthma      Mitral valve insufficiency, unspecified etiology 6/21/2017     Myelomalacia (H) 2/2011     NECK PAIN, HX CERVICAL SURGERY 2/9/2004     Osteoporosis, unspecified 2/04     POSTLAMINECTOMY STATUS-CERV      Pulmonary hypertension (H) 6/21/2017     SCOLIOSIS      Strabismic amblyopia        Past Surgical History   I have reviewed this patient's surgical history and updated it with pertinent information if needed.  Past Surgical History:   Procedure Laterality Date     C DEXA, BONE DENSITY, AXIAL SKEL  2/04    L1-L4 -4.9, L 4- 5.6,FemNk L-3.5,R-3.4, ProxiFArm:  -4.4,DistForearm:  -5.1     C FLUOROSCOPIC GUIDE & LOCALIZATION, NEEDLE/CATHETER TIP, SPINE/PARASPINE DX/THERAPEUTIC INJECTION      lumbar     C LAMINECTOMY,>2 SGMT,CERVICAL  ~?     laminectomy C3-7 with laminoplasty      CT ABDOMEN WO&W CONTRAST  2006    Slight prominence/tortuosity pancreatic duct , 2 small retroperitoneal areas with increased density (stable)     HC MRI CERVICAL SPINE W/O CONTRAST       HC MRI LUMBAR SPINE W/O CONTRAST  10/04    L5-S1 mild-mod,L3-4/L4-5 mod. bilat foraminal stenosis , L3-4/ L4-5, mod- sev. cent stenosis       Prior to Admission Medications   Prior to Admission Medications   Prescriptions Last Dose Informant Patient Reported? Taking?   Cholecalciferol (VITAMIN D) 2000 units tablet 2020  No Yes   Sig: Take 2,000 Units by mouth daily   IBANdronate (BONIVA) 150 MG tablet ~ 3 weeks ago  No No   Sig: TAKE 1 TABLET BY MOUTH EVERY 30 DAYS 1 HR BEFORE BREAKFAST WITH 8OZ OF WATER AND SIT UPRIGHT 30 MIN   ORDER FOR DME   No No   Si blood pressure cuff, automatic digital reading for home use   acetaminophen (TYLENOL) 500 MG tablet prn med  Yes Yes   Sig: Take by mouth as needed for mild pain   albuterol (PROVENTIL) (2.5 MG/3ML) 0.083% neb solution prn med  Yes Yes   Sig: Take 2.5 mg by nebulization every 6 hours as needed for shortness of breath / dyspnea or wheezing   albuterol (VENTOLIN HFA) 108 (90 Base) MCG/ACT inhaler prn med  No Yes   Sig: Inhale 2 puffs into the lungs every 4 hours as needed for shortness of breath / dyspnea or wheezing   diclofenac (VOLTAREN) 1 % topical gel prn med  No Yes   Sig: Place 4 g onto the skin 4 times daily USE FOR KNEES   Patient taking differently: Place 4 g onto the skin 4 times daily as needed USE FOR KNEES   ferrous sulfate (IRON) 325 (65 Fe) MG tablet 2020  No Yes   Sig: Take 1 tablet (325 mg) by mouth daily (with breakfast)   fluticasone-salmeterol (ADVAIR-HFA) 230-21 MCG/ACT inhaler 2020  No Yes   Sig: Inhale 2 puffs  into the lungs 2 times daily   guaiFENesin-codeine (ROBITUSSIN AC) 100-10 MG/5ML solution prn med  No Yes   Sig: Take 5 mLs by mouth every 4 hours as needed for cough   ibuprofen (ADVIL/MOTRIN) 200 MG tablet prn med  Yes No   Sig: Take by mouth as needed for mild pain   losartan (COZAAR) 25 MG tablet 8/23/2020 at Unknown time  No No   Sig: Take 1 tablet (25 mg) by mouth daily   metoprolol succinate ER (TOPROL-XL) 50 MG 24 hr tablet   No No   Sig: Take 1 tablet (50 mg) by mouth daily   multivitamin, therapeutic with minerals (THERA-VIT-M) TABS 8/22/2020 Daughter Yes Yes   Sig: Take 1 tablet by mouth daily   omeprazole (PRILOSEC) 20 MG DR capsule 8/22/2020  No Yes   Sig: TAKE 1 CAPSULE (20 MG) BY MOUTH DAILY   order for DME   No No   Sig: Equipment being ordered: TENS   order for DME   No No   Sig: Equipment being ordered:     Ensure nutritional supplement - 1/day   order for DME   No No   Sig: Equipment being ordered: peak flow meter   senna-docusate (SENOKOT-S/PERICOLACE) 8.6-50 MG tablet prn med  No Yes   Sig: Take 1 tablet by mouth 2 times daily   Patient taking differently: Take 1 tablet by mouth 2 times daily as needed    traMADol (ULTRAM) 50 MG tablet prn med  No Yes   Sig: TAKE 1 TABLET BY MOUTH EVERY 6 HOURS AS NEEDED FOR SEVERE PAIN      Facility-Administered Medications: None     Allergies   Allergies   Allergen Reactions     Alendronate Sodium      Worse acid reflux     Food      Meat, eggs, dairy       Social History   I have reviewed this patient's social history and updated it with pertinent information if needed. Wilma Lorenzana  reports that she has never smoked. She has never used smokeless tobacco. She reports that she does not drink alcohol or use drugs.    Family History   I have reviewed this patient's family history and updated it with pertinent information if needed.   Family History   Problem Relation Age of Onset     Unknown/Adopted Father      Unknown/Adopted Mother        Review of  Systems   The 10 point Review of Systems is negative other than noted in the HPI or here.     Physical Exam                      Vital Signs with Ranges     117 lbs 1.03 oz    Data   No results found for this or any previous visit (from the past 24 hour(s)).           PHYSICAL EXAMINATION:   GENERAL:  A pleasant lady in no acute distress.   VITAL SIGNS:  Blood pressure 126/72, heart rate is in the 90s, normal sinus rhythm.   HEENT:  Examination is unremarkable.  Mucous membranes appear normal.  She has no clubbing, no peripheral central cyanosis.   NECK:  Revealed no raised JVP, no thyromegaly.   CARDIAC:  Cardiac apex is not displaced.  No parasternal heaves.  Auscultation reveals normal first and second heart sound.  There is a soft 1/6 systolic murmur together with a 2-3/6 mid diastolic rumble audible at the left sternal border and also at the cardiac apex.   CHEST:  Symmetrical expansion without the use of accessory muscles.  Breath sounds are normal.   ABDOMEN:  Soft and nontender.  No hepatosplenomegaly.  The abdominal aorta is not palpable.   EXTREMITIES:  No significant peripheral edema.  Peripheral pulses are preserved and intact.   CENTRAL NERVOUS SYSTEM:  Grossly intact.   PSYCHIATRIC:  Mood is normal.      I should add that history is obtained via an .      LABORATORY INVESTIGATIONS:  Electrolytes are within normal limits.  Troponins are normal.  White cell count is 3.0, hemoglobin 11.5, platelet count of 160.  Her EKG is personally interpreted.  Please see report.      EKG shows sinus rhythm with first-degree AV block and evidence of left atrial enlargement.  There are benign repolarization changes.         JENAE LAU MD, [facility]             D: 2020   T: 2020   MT: [id number]      Name:     ABHINAV ARMSTRONG   MRN:      [id number]        Account:       [id number]   :      1940           Consult Date:  2020      Document: [id number]

## 2020-08-24 NOTE — PROGRESS NOTES
[facility]    Hospitalist Progress Note    Assessment & Plan   Wilma Lorenzana is a 79 year old female with a history of asthma, osteoporosis, probable rheumatic mitral stenosis, pulmonary hypertension, hypertension history of palpitations, 2+ MR, mild left ventricular outflow obstruction who presents with one episode of passing out and dizziness.  Patient now presents with episode of syncope and preceding dizziness.  Patient with a history of mitral stenosis, MR 2+ and mild left ventricular outflow obstruction.  Patient has been fasting for 2 weeks.     Principal Problem:    Syncope  Aortic valve disorder backslash mild aortic stenosis    Mitral valve insufficiency, unspecified etiology    Pulmonary hypertension (H)     Mitral valve stenosis, unspecified etiology  History of mild left ventricular outflow tract obstruction    Assessment: Followed by [facility] cardiology.  Echocardiogram as detailed in the HPI showed moderate mitral stenosis with mean gradient of 8 mmHg.  2+ MR, 1-2+ TR, moderate pulmonary hypertension.  2+ pulmonic valve regurgitation.  -Currently on losartan.  She is not on Toprol-XL apparently.  Losartan may be a new medication for her in the last several months.  -Now presents with an episode of syncope with preceding short prodrome of vertigo-like dizziness.  -Received 1 L normal saline in the emergency room.  She appears euvolemic.  Normal orthostatic blood pressures after 1 L of normal saline.  -Mitral regurgitation apparent on exam.  No clear diastolic murmur.  -She has been fasting for 2 weeks.  Normal labs.  -Syncope may in part be related to her mitral stenosis and possibly dynamic left ventricular outflow obstruction.  -Suspect her syncope is type factorial secondary to her fasting state, losartan, and possibly related to her underlying valve disease and possible left ventricular outflow tract obstruction progression.  Doubt primary neurologic issue.  Her dizziness has a  vertiginous quality to it but I doubt she has a primary neurologic issue prompting her vertigo.  -pt reaffirms she had syncope at home.   - nl tele overnight  - vertigo like dizziness better. She states this has been brought on by position changes, head turning etc.. no ENT symptoms.   nonfocal neuro exam  - Echo without change. Shows: EF >70%, 1+Mr, moderate mitral stenosis, moderate pulmonary htn. 1-2+ pulmonary regurg.   -seen by cardiology, thought likely vestibular neuritis.   - suspect vestibular dz but this does not lead to syncope. Suspect multifactorial dizziness related to mitral stenosis,mild dehydration from fasting, ARB and vesticular dz.   -stand by assist   Plan; cont tele, may need event monitor at discharge. Vestibular rehab inpatient (unable to see pt today), prn meclizine. No further fluids. pcp follow, hold losartan, outpatient vestibular rehab           HTN (hypertension)    Assessment: She is on losartan 25 mg daily.  She does not take the Toprol-XL 50 mg daily listed on her medications.  Her daughter sets up her medications.  She lives with her daughter.  No change on echo. Normotensive     Plan: hold losartan today and reeval tomorrow.        COPD, moderate (H)    Assessment: She is on Advair.  Lungs clear.  Chest x-ray negative.  No pulmonary complaints.  Lungs clear during stay    Plan: Restart Advair.  PRN albuterol.        Hx Palpitations    Assessment: CO patch from January 2020 was unremarkable showed several short bursts of SVT.  No atrial fibrillation.  Patient denies palpitations at this time.  EKG shows first-degree AV block.  No change from previous studies.   Plan: Telemetry has been nl    Thrombocytopenia (H)    Assessment: plat ct at baseline       Neutropenia, unspecified type (H)    Assessment: Her white blood cell count is stable.  She is at her baseline.  No infectious symptoms.  Wbc at baseline.     DVT Prophylaxis: Patient is ambulatory,  Code Status: Full  Code     Disposition:  likely discharge tomorrow after seeing vesitbular rehab.   Discussed care plan with pt, rn, and pt's dtr.     David Anderson MD  Text Page  (7am to 6pm)  Interval History   Vertigo is getting better. No nausea or vomiting with episodes, po intake/appetite reduced.   No new issues. Nl tele.   Still with positional dizziness at times. Stand by assist today per RN.   Yet to see vestibular rehab    -Data reviewed today: I reviewed all new labs and imaging results over the last 24 hours. I personally reviewed labs and imaging since admisison    Physical Exam   Temp: 97.6  F (36.4  C) Temp src: Oral BP: 134/76 Pulse: 82   Resp: 18 SpO2: 97 % O2 Device: None (Room air)    Vitals:    08/24/20 0646   Weight: 51.6 kg (113 lb 12.1 oz)     Vital Signs with Ranges  Temp:  [97.4  F (36.3  C)-98.1  F (36.7  C)] 97.6  F (36.4  C)  Pulse:  [60-99] 82  Resp:  [18] 18  BP: (109-150)/(53-76) 134/76  SpO2:  [95 %-98 %] 97 %  I/O last 3 completed shifts:  In: 240 [P.O.:240]  Out: -     Constitutional: In bed, nad  Respiratory: CTAB  Cardiovascular: RRR no r/g/m  GI: soft, nt, nd  Skin/Integumen: no rash or edema  Neuro: CN 2-12 grossly intact, strength 5/5 extrem X 4. Nl finger nose finger, no nystagmus.          Medications     sodium chloride Stopped (08/23/20 1342)       fluticasone-vilanterol  1 puff Inhalation Daily       Data   Recent Labs   Lab 08/24/20  0835 08/23/20  1829 08/23/20  1503 08/23/20  0945   WBC 2.6*  --   --  3.0*   HGB 12.0  --   --  11.5*   MCV 98  --   --  97     --   --  160     --   --  138   POTASSIUM 3.9  --   --  4.0   CHLORIDE 107  --   --  106   CO2 29  --   --  29   BUN 9  --   --  16   CR 0.57  --   --  0.64   ANIONGAP 4  --   --  3   DORY 8.8  --   --  8.7   *  --   --  87   ALBUMIN  --   --   --  3.8   PROTTOTAL  --   --   --  8.1   BILITOTAL  --   --   --  0.4   ALKPHOS  --   --   --  74   ALT  --   --   --  16   AST  --   --   --  13   TROPI  --  0.018  0.016 <0.015       Imaging:   No results found for this or any previous visit (from the past 24 hour(s)).

## 2020-08-24 NOTE — PHARMACY-ADMISSION MEDICATION HISTORY
Pharmacy Medication History  Admission medication history interview status for the 8/23/2020  admission is complete. See EPIC admission navigator for prior to admission medications     Medication history sources: Patient's family/friend (daughter [employer]etlework)  Medication history source reliability: Good  Adherence assessment: Good    Additional medication history information:   Daughter states not taking. 90 day supply was picked from [facility] on [place] 6/23/20.    Medication reconciliation completed by provider prior to medication history? Yes    Time spent in this activity: 20 minutes      Prior to Admission medications    Medication Sig Last Dose Taking? Auth Provider   acetaminophen (TYLENOL) 500 MG tablet Take by mouth as needed for mild pain prn med Yes Unknown, Entered By History   albuterol (PROVENTIL) (2.5 MG/3ML) 0.083% neb solution Take 2.5 mg by nebulization every 6 hours as needed for shortness of breath / dyspnea or wheezing prn med Yes Reported, Patient   albuterol (VENTOLIN HFA) 108 (90 Base) MCG/ACT inhaler Inhale 2 puffs into the lungs every 4 hours as needed for shortness of breath / dyspnea or wheezing prn med Yes Lavelle Aj MD   Cholecalciferol (VITAMIN D) 2000 units tablet Take 2,000 Units by mouth daily 8/22/2020 Yes Lavelle Aj MD   diclofenac (VOLTAREN) 1 % topical gel Place 4 g onto the skin 4 times daily USE FOR KNEES  Patient taking differently: Place 4 g onto the skin 4 times daily as needed USE FOR KNEES prn med Yes Lavelle Aj MD   ferrous sulfate (IRON) 325 (65 Fe) MG tablet Take 1 tablet (325 mg) by mouth daily (with breakfast) 8/22/2020 Yes Lavelle Aj MD   fluticasone-salmeterol (ADVAIR-HFA) 230-21 MCG/ACT inhaler Inhale 2 puffs into the lungs 2 times daily 8/22/2020 Yes Lavelle Aj MD   guaiFENesin-codeine (ROBITUSSIN AC) 100-10 MG/5ML solution Take 5 mLs by mouth every 4 hours as needed for cough prn med Yes  Lavelle Aj MD   ibuprofen (ADVIL/MOTRIN) 200 MG tablet Take by mouth as needed for mild pain prn med Yes Unknown, Entered By History   losartan (COZAAR) 25 MG tablet Take 1 tablet (25 mg) by mouth daily 8/23/2020 at Unknown time Yes Lavelle Aj MD   multivitamin, therapeutic with minerals (THERA-VIT-M) TABS Take 1 tablet by mouth daily 8/22/2020 Yes Unknown, Entered By History   omeprazole (PRILOSEC) 20 MG DR capsule TAKE 1 CAPSULE (20 MG) BY MOUTH DAILY 8/22/2020 Yes aLvelle Aj MD   senna-docusate (SENOKOT-S/PERICOLACE) 8.6-50 MG tablet Take 1 tablet by mouth 2 times daily  Patient taking differently: Take 1 tablet by mouth 2 times daily as needed  prn med Yes Lavelle Aj MD   traMADol (ULTRAM) 50 MG tablet TAKE 1 TABLET BY MOUTH EVERY 6 HOURS AS NEEDED FOR SEVERE PAIN prn med Yes Lavelle Aj MD   IBANdronate (BONIVA) 150 MG tablet TAKE 1 TABLET BY MOUTH EVERY 30 DAYS 1 HR BEFORE BREAKFAST WITH 8OZ OF WATER AND SIT UPRIGHT 30 MIN ~ 3 weeks ago  Lavelle Aj MD   metoprolol succinate ER (TOPROL-XL) 50 MG 24 hr tablet Take 1 tablet (50 mg) by mouth daily   Lavelle Aj MD   order for DME Equipment being ordered: peak flow meter   Lavelle Aj MD   order for DME Equipment being ordered:     Ensure nutritional supplement - 1/day   Lavelle Aj MD   order for DME Equipment being ordered: TENS   Lavelle Aj MD   ORDER FOR DME 1 blood pressure cuff, automatic digital reading for home use   Marbella Guidry DO Beth Mulder, PharmD

## 2020-08-25 ENCOUNTER — APPOINTMENT (OUTPATIENT)
Dept: CARDIOLOGY | Facility: CLINIC | Age: 80
End: 2020-08-25
Attending: INTERNAL MEDICINE
Payer: COMMERCIAL

## 2020-08-25 ENCOUNTER — PATIENT OUTREACH (OUTPATIENT)
Dept: GERIATRIC MEDICINE | Facility: CLINIC | Age: 80
End: 2020-08-25

## 2020-08-25 ENCOUNTER — APPOINTMENT (OUTPATIENT)
Dept: PHYSICAL THERAPY | Facility: CLINIC | Age: 80
End: 2020-08-25
Attending: INTERNAL MEDICINE
Payer: COMMERCIAL

## 2020-08-25 VITALS
SYSTOLIC BLOOD PRESSURE: 138 MMHG | HEART RATE: 76 BPM | RESPIRATION RATE: 18 BRPM | DIASTOLIC BLOOD PRESSURE: 68 MMHG | WEIGHT: 117.06 LBS | TEMPERATURE: 98.7 F | BODY MASS INDEX: 22.86 KG/M2 | OXYGEN SATURATION: 97 %

## 2020-08-25 PROBLEM — H81.12 BENIGN PAROXYSMAL POSITIONAL VERTIGO OF LEFT EAR: Status: ACTIVE | Noted: 2020-08-25

## 2020-08-25 PROCEDURE — 93005 ELECTROCARDIOGRAM TRACING: CPT | Mod: 59

## 2020-08-25 PROCEDURE — 93272 ECG/REVIEW INTERPRET ONLY: CPT | Performed by: INTERNAL MEDICINE

## 2020-08-25 PROCEDURE — G0378 HOSPITAL OBSERVATION PER HR: HCPCS

## 2020-08-25 PROCEDURE — 97116 GAIT TRAINING THERAPY: CPT | Mod: GP | Performed by: PHYSICAL THERAPIST

## 2020-08-25 PROCEDURE — 93270 REMOTE 30 DAY ECG REV/REPORT: CPT

## 2020-08-25 PROCEDURE — 99217 ZZC OBSERVATION CARE DISCHARGE: CPT | Performed by: INTERNAL MEDICINE

## 2020-08-25 PROCEDURE — 95992 CANALITH REPOSITIONING PROC: CPT | Mod: GP | Performed by: PHYSICAL THERAPIST

## 2020-08-25 PROCEDURE — 25000132 ZZH RX MED GY IP 250 OP 250 PS 637: Performed by: INTERNAL MEDICINE

## 2020-08-25 PROCEDURE — 97162 PT EVAL MOD COMPLEX 30 MIN: CPT | Mod: GP | Performed by: PHYSICAL THERAPIST

## 2020-08-25 PROCEDURE — 93010 ELECTROCARDIOGRAM REPORT: CPT | Performed by: INTERNAL MEDICINE

## 2020-08-25 RX ORDER — LOSARTAN POTASSIUM 25 MG/1
25 TABLET ORAL DAILY
Status: DISCONTINUED | OUTPATIENT
Start: 2020-08-25 | End: 2020-08-25

## 2020-08-25 RX ORDER — METOPROLOL SUCCINATE 50 MG/1
50 TABLET, EXTENDED RELEASE ORAL DAILY
Qty: 60 TABLET | Refills: 0 | Status: SHIPPED | OUTPATIENT
Start: 2020-08-26 | End: 2020-09-02

## 2020-08-25 RX ORDER — MECLIZINE HCL 12.5 MG 12.5 MG/1
12.5 TABLET ORAL 3 TIMES DAILY PRN
Qty: 10 TABLET | Refills: 0 | Status: SHIPPED | OUTPATIENT
Start: 2020-08-25 | End: 2020-11-12

## 2020-08-25 RX ORDER — METOPROLOL SUCCINATE 50 MG/1
50 TABLET, EXTENDED RELEASE ORAL ONCE
Status: COMPLETED | OUTPATIENT
Start: 2020-08-25 | End: 2020-08-25

## 2020-08-25 RX ADMIN — METOPROLOL SUCCINATE 50 MG: 50 TABLET, EXTENDED RELEASE ORAL at 12:17

## 2020-08-25 RX ADMIN — FLUTICASONE FUROATE AND VILANTEROL TRIFENATATE 1 PUFF: 200; 25 POWDER RESPIRATORY (INHALATION) at 10:23

## 2020-08-25 NOTE — PROGRESS NOTES
SW:  Received a call from Dileep Jorge,  Partner .  She reports patient lives with her daughter.  Daughter provides assistance for IADL's.   Ms Luisito and a previous  have offered [facility] assessment for services however the daughter declines the need.  Patient doesn't receive any services under her  [facility] plan.

## 2020-08-25 NOTE — DISCHARGE SUMMARY
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     Patient states she was in her usual state of health until today while on the floor praying she developed a spinning-like sensation and shortly thereafter passed out while sitting on the floor while she was praying.  She felt like the room was spinning.  This occurred for several seconds and was not prolonged prior to her losing consciousness.  She feels the loss of consciousness lasted several minutes.  She did not bite her tongue or have any urinary incontinence.  She woke up and called her daughter who came over to help her.  She lives with her daughter.  Patient took her losartan this morning.  Patient has been fasting for the last 2 weeks for Taoist reasons.  She is able to drink fluids and eat after 3 PM.  She has been consistent with the eating and drinking in the afternoons.  Her fasting ended yesterday.  She has had dizziness and possibly a passing out episode about 4 years ago while she was fasting.  Patient denies having had any dizziness or vertigo-like sensation while fasting over the last 2 weeks.     She has been followed by Dr. controls her of [facility] cardiology.  Last seen January 2020.  She had noted some palpitations at that visit apparently.  She had a Ziehl patch done to rule out tachyarrhythmias or atrial fibrillation given her mitral stenosis.  This showed only 3 episodes of SVT that were brief.  Cardiogram at that time showed an EF of 65 to  70%.  Mild left ventricular outflow tract obstruction.  Moderate mitral stenosis with mean gradient of 8 mm per mercury and 2+ MR.  1-2+ TR.  Moderate pulmonary hypertension.        Patient denies any hearing loss, tinnitus, ear pain or ear fullness.  No fevers, chills, myalgias, headache, paresthesias, change in vision or speech.  No focal weakness or paresthesias.  No cough or shortness of breath.  No chest pain or palpitations.  No nausea vomiting or diarrhea.  No bleeding.  No hematuria or bloody stools.  She is felt well otherwise.    Patient had her antihypertensives changed to losartan several months ago.  She is not taking Toprol-XL.        In the emergency room patient is afebrile.  Normotensive.  Normal orthostatic blood pressure and pulse.  Normal oxygen saturations.  Normal exam.  Nonfocal neuro exam.  Amari studies notable for normal BMP, LFTs, troponin.  Blood sugar 87.  White blood cell count 3 and stable from baseline.  Hemoglobin 11.5 which is her baseline.     Chest x-ray shows no acute findings..  Linear atelectasis left base.  No acute findings.     EKG shows normal sinus rhythm, first-degree AV block.  Remainder of intervals are normal.  No acute ST-T wave changes.  No change from prior study.     In the emergency room she was given 1 L normal saline bolus.  Her orthostatic blood pressures were checked after the liter bolus was given.         Hospital Course   Wilma Lorenzana was admitted on 8/23/2020.  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  Patient now presents with episode of syncope and preceding dizziness.  Patient with a history of mitral stenosis, MR 2+ and mild left ventricular outflow obstruction.  Patient has been fasting for 2 weeks.     Principal Problem:    Syncope  Aortic valve disorder backslash mild aortic stenosis    Mitral valve insufficiency, unspecified etiology    Pulmonary hypertension (H)     Mitral valve stenosis, unspecified etiology  History of mild left ventricular outflow tract obstruction    Assessment:  - Followed by [facility] cardiology.  Echocardiogram as detailed in the HPI showed moderate mitral stenosis with mean gradient of 8 mmHg.  2+ MR, 1-2+ TR, moderate pulmonary hypertension.  2+ pulmonic valve regurgitation.  -Currently on losartan.  She is not on Toprol-XL apparently.  -Losartan may be a new medication for her in the last several months.  -presents with an episode of syncope with preceding short prodrome of vertigo-like dizziness.  -Received 1 L normal saline in the emergency room.  She appears euvolemic.  Normal orthostatic blood pressures after 1 L of normal saline.  -Mitral regurgitation apparent on exam.  No clear diastolic murmur.  -She has been fasting for 2 weeks.  Normal labs.  -Syncope may in part be related to her mitral stenosis and possibly dynamic left ventricular outflow obstruction.  -Suspect her syncope is type factorial secondary to her fasting state, losartan, and possibly related to her underlying valve disease and possible left ventricular outflow tract obstruction progression.  Doubt primary neurologic issue.   Pt dizziness c/w BPPV (left). Pt present with vertigo and positive camila hallpike test on 8/25 by vestibular rehab.   - possible vasovagal component  -pt  had episode of SVT for ~1 min or less day of discharge, given this will place her back on Toprol XL 50mg every day (pt had stopped at some point in the past year, details unclear) and stop losartan for now  - follow up ekg showed    ekg showed NSR 1st degree av block.   - discharge on event monitor and cardiology follow up.   - Pt had HR  at times. 75 at discharge post Toprol XL. No chf. Had resolution of dizziness with Epley maneuver today.   -Pcp follow up.   - asked her to consider limiting or stopping fasting in future  -30 day event monitor placed to ensure no tachydysrythmias/afib. Pt had nl ziopatch earlier this year.         Left BPPV  Present with new onset spinning/vertigo like dizziness. First experienced when on floor praying. Brought on/exacerbated with position change and head turning. No associated N/V. nonfocal neuro exam.   Pt had clear improvement day after admission. Pt seen by PT vestibular rehab on day of discharge. On exam patient had positive [place] Halpike test to the left. Epley maneuver with no nystagmus with retesting. She had subsequent complete resolution of her vertigo.   Follow up pcp, outpatient vestibular rehab, prn meclizine.            HTN (hypertension)    Assessment: She is on losartan 25 mg daily.  She does not take the Toprol-XL 50 mg daily listed on her medications.  Her daughter sets up her medications.  She lives with her daughter.    Plan: discharge on metoprolol (restarting as pt stop prior to admission), hold losartan, restart losartan in clinici if running high       COPD, moderate (H)    Assessment: She is on Advair.  Lungs clear.  Chest x-ray negative.  No pulmonary complaints.    Plan: Restart Advair.  PRN albuterol.        Hx Palpitations    Assessment: CO patch from January 2020 was unremarkable showed several short bursts of SVT.  No atrial fibrillation.  Patient denies palpitations at this time.  EKG shows first-degree AV block.  No change from previous  studies.   Plan: Telemetry.  See discussion above. Given short run of SVT and syncope at home will discharge on event monitor and cardiology follow up.            Neutropenia, unspecified type (H)  thrombocytopenia    Assessment: counts at baseline during stay.       DVT Prophylaxis: Patient is ambulatory,  Code Status: Full Code     Disposition: discharge home with her daughter. Lives with dtr.     David Anderson MD, MD    Significant Results and Procedures    See hospital    Pending Results   none  Unresulted Labs Ordered in the Past 30 Days of this Admission     No orders found from 7/24/2020 to 8/24/2020.          Code Status   Full Code       Primary Care Physician   Lavelle Aj MD    Physical Exam   Temp: 97.7  F (36.5  C) Temp src: Oral BP: (!) 142/74 Pulse: 92   Resp: 18 SpO2: 97 % O2 Device: None (Room air)    Vitals:    08/24/20 0646 08/25/20 0544   Weight: 51.6 kg (113 lb 12.1 oz) 53.1 kg (117 lb 1 oz)     Vital Signs with Ranges  Temp:  [97.5  F (36.4  C)-97.7  F (36.5  C)] 97.7  F (36.5  C)  Pulse:  [66-92] 92  Resp:  [18] 18  BP: (134-149)/(61-76) 142/74  SpO2:  [97 %-98 %] 97 %  I/O last 3 completed shifts:  In: 680 [P.O.:680]  Out: -   Constitutional: In bed, nad  Respiratory:     CTAB  Cardiovascular: RRR no r/g/m, 2/6 holo systolic murmer apex, no clear diastolic murmur  GI: soft, nt, nd  Skin/Integumen: no rash or edema  Neuro: CN 2-12 grossly intact, strength 5/5 extrem X 4. Nl finger nose finger,     Discharge Disposition   Discharged to home  Condition at discharge: Good    Consultations This Hospital Stay   CARDIOLOGY IP CONSULT  PHYSICAL THERAPY ADULT IP CONSULT    Time Spent on this Encounter   I, David Anderson MD, personally saw the patient today and spent greater than 30 minutes discharging this patient.    Discharge Orders      PHYSICAL THERAPY REFERRAL      PHYSICAL THERAPY REFERRAL      Follow-Up with Cardiologist      Reason for your hospital stay    BPPV (benign  paroxysmal positional vertigo) - left.     Follow-up and recommended labs and tests     1. Event monitor 2 weeks  2. Referral placed for vestibular rehab specialist  3. Primary care provider follow up within week.   4. Follow up with cardiology in 2 weeks after completion of event monitor     Activity    Your activity upon discharge: activity as tolerated     Discharge Instructions    1. Meclizine as needed for dizziness.   2. Follow up with vestibular rehab therapist if recurrent vertigo  3. Resume prior medications. Would avoid ultram/Tramadol if possible given risk for falls     Full Code     Cardiac Event Monitor Adult Pediatric     Diet    Follow this diet upon discharge: Orders Placed This Encounter      Regular Diet Adult     Discharge Medications   Discharge Medication List as of 8/25/2020 12:59 PM      START taking these medications    Details   meclizine (ANTIVERT) 12.5 MG tablet Take 1 tablet (12.5 mg) by mouth 3 times daily as needed for dizziness or other (vertigo/spinning dizziness), Disp-10 tablet,R-0, E-PrescribeFuture refills by PCP Dr. Lavelle Aj MD with phone number [phone].         CONTINUE these medications which have CHANGED    Details   metoprolol succinate ER (TOPROL-XL) 50 MG 24 hr tablet Take 1 tablet (50 mg) by mouth daily, Disp-60 tablet,R-0, E-PrescribeFuture refills by PCP Dr. Lavelle Aj MD with phone number [phone].         CONTINUE these medications which have NOT CHANGED    Details   acetaminophen (TYLENOL) 500 MG tablet Take by mouth as needed for mild pain, Historical      albuterol (PROVENTIL) (2.5 MG/3ML) 0.083% neb solution Take 2.5 mg by nebulization every 6 hours as needed for shortness of breath / dyspnea or wheezing, Historical      albuterol (VENTOLIN HFA) 108 (90 Base) MCG/ACT inhaler Inhale 2 puffs into the lungs every 4 hours as needed for shortness of breath / dyspnea or wheezing, Disp-54 g, R-3, E-PrescribePharmacy may dispense brand  covered by insurance (Proair, or proventil or ventolin or generic albuterol inhaler)      Cholecalciferol (VITAMIN D) 2000 units tablet Take 2,000 Units by mouth daily, Disp-100 tablet, R-3, E-Prescribe      diclofenac (VOLTAREN) 1 % topical gel Place 4 g onto the skin 4 times daily USE FOR KNEES, Disp-100 g, R-11, E-Prescribe      ferrous sulfate (IRON) 325 (65 Fe) MG tablet Take 1 tablet (325 mg) by mouth daily (with breakfast), Disp-90 tablet, R-3, E-Prescribe      fluticasone-salmeterol (ADVAIR-HFA) 230-21 MCG/ACT inhaler Inhale 2 puffs into the lungs 2 times daily, Disp-3 Inhaler,R-3, E-Prescribe      guaiFENesin-codeine (ROBITUSSIN AC) 100-10 MG/5ML solution Take 5 mLs by mouth every 4 hours as needed for cough, Disp-236 mL,R-2, E-Prescribe      multivitamin, therapeutic with minerals (THERA-VIT-M) TABS Take 1 tablet by mouth daily, Historical      omeprazole (PRILOSEC) 20 MG DR capsule TAKE 1 CAPSULE (20 MG) BY MOUTH DAILY, Disp-90 capsule, R-3, E-Prescribe      senna-docusate (SENOKOT-S/PERICOLACE) 8.6-50 MG tablet Take 1 tablet by mouth 2 times daily, Disp-60 tablet,R-11, E-Prescribe      traMADol (ULTRAM) 50 MG tablet TAKE 1 TABLET BY MOUTH EVERY 6 HOURS AS NEEDED FOR SEVERE PAIN, Disp-90 tablet,R-3, E-Prescribe      IBANdronate (BONIVA) 150 MG tablet TAKE 1 TABLET BY MOUTH EVERY 30 DAYS 1 HR BEFORE BREAKFAST WITH 8OZ OF WATER AND SIT UPRIGHT 30 MIN, Disp-3 tablet,R-3, E-Prescribe      !! order for DME Equipment being ordered: peak flow meterDisp-1 each,R-0, Local Print      !! order for DME Equipment being ordered:     Ensure nutritional supplement - 1/dayDisp-30 each, R-11, Local Print      !! order for DME Equipment being ordered: TENSDisp-1 each, R-0, Local Print      !! ORDER FOR DME 1 blood pressure cuff, automatic digital reading for home useDisp-1 Device, R-0, Normal       !! - Potential duplicate medications found. Please discuss with provider.      STOP taking these medications       ibuprofen  (ADVIL/MOTRIN) 200 MG tablet Comments:   Reason for Stopping:         losartan (COZAAR) 25 MG tablet Comments:   Reason for Stopping:             Allergies   Allergies   Allergen Reactions     Alendronate Sodium      Worse acid reflux     Food      Meat, eggs, dairy     Data   Most Recent 3 CBC's:  Recent Labs   Lab Test 08/24/20  0835 08/23/20  0945 07/03/20  1619   WBC 2.6* 3.0* 3.5*   HGB 12.0 11.5* 12.5   MCV 98 97 97    160 134*      Most Recent 3 BMP's:  Recent Labs   Lab Test 08/24/20  0835 08/23/20  0945 07/03/20  1619    138 141   POTASSIUM 3.9 4.0 4.4   CHLORIDE 107 106 109   CO2 29 29 29   BUN 9 16 12   CR 0.57 0.64 0.62   ANIONGAP 4 3 3   DORY 8.8 8.7 8.6   * 87 112*     Most Recent 2 LFT's:  Recent Labs   Lab Test 08/23/20  0945 07/03/20  1619   AST 13 19   ALT 16 19   ALKPHOS 74 80   BILITOTAL 0.4 0.2     Most Recent INR's and Anticoagulation Dosing History:  Anticoagulation Dose History     There is no flowsheet data to display.        Most Recent 3 Troponin's:  Recent Labs   Lab Test 08/23/20  1829 08/23/20  1503 08/23/20  0945   TROPI 0.018 0.016 <0.015     Most Recent Cholesterol Panel:  Recent Labs   Lab Test 01/21/20  1329   CHOL 206*   *   HDL 61   TRIG 97     Most Recent 6 Bacteria Isolates From Any Culture (See EPIC Reports for Culture Details):  Recent Labs   Lab Test 09/14/18  1441 10/02/15  0535 10/02/15  0421 10/02/15  0355   CULT No beta hemolytic Streptococcus Group A isolated No Beta Streptococcus isolated No growth No growth     Most Recent TSH, T4 and A1c Labs:  Recent Labs   Lab Test 07/03/20  1619   TSH 0.80     Results for orders placed or performed during the hospital encounter of 08/23/20   XR Chest 2 Views    Narrative    CHEST TWO VIEWS 8/23/2020 10:10 AM     HISTORY: Syncope    COMPARISON: 9/14/2018, chest CT 6/29/2020       Impression    Impression: Hyperinflation. No focal infiltrate or consolidation.  Minimal linear atelectasis left lung base. No  evidence for  pneumothorax. Normal heart size. Normal pulmonary vascularity.  Thoracic kyphosis with degenerative changes of the spine. Marked  degenerative changes both shoulders.    CURT L BEHRNS, MD   Echocardiogram Complete    Narrative    [id number]  [id number]  [id number]  [id number]^[id number]^PETER^E           [facility]  Echocardiography Laboratory  [address] [place] [address]        Name: ABHINAV ARMSTRONG  MRN: [id number]  : 1940  Study Date: 2020 02:14 PM  Age: 79 yrs  Gender: Female  Patient [facility]: [facility]  Reason For Study: Syncope  Ordering Physician: STUART PATHAK  Referring Physician: Lavelle Aj  Performed By: Raphael Anguiano     BSA: 1.5 m2  Height: 60 in  Weight: 121 lb  HR: 94  BP: 126/72 mmHg  _____________________________________________________________________________  __        Procedure  Complete Portable Echo Adult.  _____________________________________________________________________________  __        Interpretation Summary     The visual ejection fraction is estimated at >70%.  The left atrium is moderate to severely dilated.  There is mild (1+) mitral regurgitation.  There is moderate mitral stenosis.  Right ventricular systolic pressure is elevated, consistent with mild to  moderate pulmonary hypertension.  There is mild to moderate (1-2+) pulmonic valvular regurgitation.  Compared to prior study, there is no significant change.  _____________________________________________________________________________  __        Left Ventricle  The left ventricle is normal in size. There is normal left ventricular wall  thickness. The visual ejection fraction is estimated at >70%. Left ventricular  diastolic function is abnormal.     Right Ventricle  The right ventricle is normal in structure, function and size.     Atria  The left atrium is moderate to severely dilated. Right atrial size is normal.     Mitral Valve  There is mild (1+) mitral  regurgitation. There is moderate mitral stenosis. MV  opens adequately, no diastolic doming, MVA calculation and gradients likely  inaccurate due to tachycardia with merging of E and A waves.        Tricuspid Valve  The tricuspid valve is not well visualized, but is grossly normal. Right  ventricular systolic pressure is elevated, consistent with mild to moderate  pulmonary hypertension. There is trace to mild tricuspid regurgitation.     Aortic Valve  There is mild trileaflet aortic sclerosis.     Pulmonic Valve  The pulmonic valve is not well seen, but is grossly normal. There is mild to  moderate (1-2+) pulmonic valvular regurgitation.     Vessels  The aortic root is normal size. Normal size ascending aorta. The inferior vena  cava is normal.     Pericardium  There is no pericardial effusion.        Rhythm  Sinus rhythm was noted.  _____________________________________________________________________________  __  MMode/2D Measurements & Calculations  IVSd: 0.87 cm     LVIDd: 3.9 cm  LVIDs: 1.1 cm  LVPWd: 0.97 cm  FS: 72.8 %  LV mass(C)d: 106.7 grams  LV mass(C)dI: 70.8 grams/m2  Ao root diam: 2.7 cm  asc Aorta Diam: 3.4 cm  LVOT diam: 1.7 cm  LVOT area: 2.2 cm2  LA Volume (BP): 84.0 ml  LA Volume Index (BP): 55.6 ml/m2  RWT: 0.50           Doppler Measurements & Calculations  MV E max willie: 270.0 cm/sec  MV A max willie: 163.8 cm/sec  MV E/A: 1.6  MV max P.7 mmHg  MV mean P.4 mmHg  MV V2 VTI: 62.5 cm  MVA(VTI): 1.1 cm2  MV dec slope: 1396 cm/sec2  MV dec time: 0.19 sec  Ao V2 max: 232.1 cm/sec  Ao max P.0 mmHg  Ao V2 mean: 163.2 cm/sec  Ao mean P.4 mmHg  Ao V2 VTI: 45.3 cm  RASHAD(I,D): 1.5 cm2  RASHAD(V,D): 1.7 cm2  LV V1 max P.3 mmHg  LV V1 max: 175.2 cm/sec  LV V1 VTI: 29.9 cm  SV(LVOT): 66.1 ml  SI(LVOT): 43.8 ml/m2  PA acc time: 0.10 sec  TR max willie: 310.8 cm/sec  TR max P.6 mmHg  AV Willie Ratio (DI): 0.75  RASHAD Index (cm2/m2): 0.97  E/E' av.1  Lateral E/e': 23.4  Medial E/e': 38.8               _____________________________________________________________________________  __        Report approved by: Marion Bruno 08/23/2020 03:52 PM

## 2020-08-25 NOTE — PROGRESS NOTES
TRANSITIONS OF CARE (CHRISTIANO) LOG   CHRISTIANO tasks should be completed by the CC within one (1) business day of notification of each transition. Follow up contact with member is required after return to their usual care setting.  Note:  If CC finds out about the transitions fifteen (15) days or more after the member has returned to their usual care setting, no CHRISTIANO log is needed. However, the CC should check in with the member to discuss the transition process, any changes needed to the care plan and document it in a case note.    Member Name:  Wilma Lorenzana MCO Name:  [employer] Plan Member ID#: [id number]   Product: [facility] Care Coordinator Contact:  Dileep Jorge RN Agency/[facility]/Care System: [facility]   Transition Communication Actions from Care Management Contact   Transition #1   Notification Date: 8/25/20 Transition Date:   8/23/20 Transition From: Home     Is this the member s usual care setting?               yes Transition To: [facility].   Transition Type:  Unplanned  Reason for Admission/Comments:  8/25, member was admitted due to syncope episode and dizziness. Member has fasted for 2 weeks.     Dileep Jorge RN Care Coordinator  [facility]  Office: [phone]  Fax: [phone]  [email or web address]   Shared CC contact info, care plan/services with receiving setting--Date completed: 8/25/20, spoken with Alfreda PRIDE, at [phone].    Notified PCP of transition--Date completed:  8/25/20     via  EMR   Transition #2      Notification Date: 8/26/20       Transition To:  Home  Transition Date: 8/25/20     Transition Type:    Planned  Notified PCP -- Date completed: 8/26/20              Shared CC contact info, care plan/services with receiving setting or, if applicable, home care agency--Date completed:  NA  *Complete additional tasks below, if this transition is a return to usual care setting.      Comments:  See below.      *Complete tasks below when the  member is discharging TO their usual care setting within one (1) business day of notification.  For situations where the Care Coordinator is notified of the discharge prior to the date of discharge, the Care Coordinator must follow up with the member or designated representative to confirm that discharge actually occurred and discuss required CHRISTIANO tasks as outlined in the CHRISTIANO Instructions.  (This includes situations where it may be a  new  usual care setting for the member. (i.e., a community member who decides upon permanent nursing home placement following hospitalization and rehab).    Date completed: 8/26/20  Communicated with member or their designated representative about the following:  care transition process; about changes to the member s health status; plan of care updates; education about transitions and how to prevent unplanned transitions/readmissions  Four Pillars for Optimal Transition:    Check  Yes  - if the member, family member and/or SNF/facility staff manages the following:    If  No  provide explanation in the comments section.          [x]  Yes     []  No     Does the member have a follow-up appointment scheduled with primary care or specialist? (Mental health hospitalizations--the appt. should be w/in 7 days) Reviewed all appts scheduled on [facility] with daughter. She will call the clinic to reschedule with member's PCP.   [x]  Yes     []  No     Can the member manage their medications or is there a system in place to manage medications (e.g. home care set-up)?  Daughter assist.       [x]  Yes     []  No     Can the member verbalize warning signs and symptoms to watch for and how to respond?         [x]  Yes     []  No     Does the member use a Personal Health Care Record?  Check  Yes  if visit summary, discharge summary, and/or healthcare summary are being used as a PHR.                                                                                                                                                                                     [x] Yes      [] No      Have you updated the member s care plan?  If  No  provide explanation in comments.   Comments:  8/26/20 Called and spoken with daughter Destiny, reviewed discharge summary: medications, future appts, and event monitor. Discussed regarding HRA to access community services when needed. Daughter stated she will think about it and let CC know if she is interested and needs more assistance.      Dileep Jorge RN Care Coordinator  [facility]  Office: [phone]  Fax: [phone]  [email or web address]

## 2020-08-25 NOTE — PROGRESS NOTES
08/25/20 0854   Quick Adds   Quick Adds Vestibular Eval       Present yes  (via jabber)   Living Environment   Lives With child(brown), adult  (daughter)   Living Arrangements   ([facility])   Home Accessibility stairs within home  (Rail on both sides.)   Number of Stairs, Within Home, Primary   (12 to bedroom)   Stair Railings, Within Home, Primary   (bilateral rails. )   Transportation Anticipated family or friend will provide  (Daughter does. )   Living Environment Comment Patient is independent with ADL's. Uses cane outside the [facility].    Self-Care   Usual Activity Tolerance good   Current Activity Tolerance fair   Equipment Currently Used at Home cane, straight;shower chair   Functional Level Prior   Ambulation 0-->independent   Transferring 0-->independent   Toileting 0-->independent   Bathing 1-->assistive equipment   Communication 0-->understands/communicates without difficulty   Swallowing 0-->swallows foods/liquids without difficulty   Cognition 0 - no cognition issues reported   Fall history within last six months no   Which of the above functional risks had a recent onset or change? none   General Information   Onset of Illness/Injury or Date of Surgery - Date 08/23/20   Referring Physician David Anderson MD    Patient/Family Goals Statement To go home.    Pertinent History of Current Problem (include personal factors and/or comorbidities that impact the POC) Symptoms started Sunday morning with the room spinning and the room going black. No changes in hearing. No nausea or vomiting.    Precautions/Limitations fall precautions   General Observations Daughter present and very supportive.   Cognitive Status Examination   Orientation orientation to person, place and time   Level of Consciousness alert   Follows Commands and Answers Questions 100% of the time   Personal Safety and Judgment intact   Memory intact   Pain Assessment   Patient Currently in Pain No    Integumentary/Edema   Integumentary/Edema no deficits were identifed   Posture    Posture Not impaired   Range of Motion (ROM)   ROM Quick Adds No deficits were identified   Strength   Strength Comments Pateint feeling generally weak but no specific deficits noted.    Bed Mobility   Bed Mobility Comments Independent sup to/from sit.    Transfer Skills   Transfer Comments Independent sit to/from stand.    Gait   Gait Comments Gait without AD in room without assist but very guarded and slow with decreeased step length.   Balance   Balance Comments Good static sitting and standing balance. Slightly impaired dynamic standing balance with patient guarded during amb and occasionally taking an extra step to regain balance.    Sensory Examination   Sensory Perception no deficits were identified   Oculomotor Exam   Smooth Pursuit Other   Smooth Pursuit Comment Patient having difficulty following cues for what to do but eventually able to follow fairly smoothly. Right eye everted.    VOR Comments Again having difficultly following what to do and never able to completely follow.    VOR Cancellation Normal   Infrared Goggle Exam or Frenzel Lense Exam   Exam completed with Infrared Goggles   Spontaneous Nystagmus Upbeating R torsional   Houston-Hallpike (Right) Upbeating R torsional   Clare-Hallpike (Right) Comments latent onset of 15 seconds. lasted 1 1/2 min   Houston-Hallpike (Left) Negative   General Therapy Interventions   Planned Therapy Interventions gait training;other (see comments)   Intervention Comments Clare Hallpike maneuver   Clinical Impression   Criteria for Skilled Therapeutic Intervention yes, treatment indicated   PT Diagnosis Impaired balance due to right BPPV   Influenced by the following impairments + right Houston Hallpike maneuver   Functional limitations due to impairments Slow and guarded with amb with occasional deviation requiring stepping strategy.    Clinical Presentation Stable/Uncomplicated   Clinical Decision  Making (Complexity) Moderate complexity   Therapy Frequency   (One time eval and treat. )   Predicted Duration of Therapy Intervention (days/wks) One time eval and treat.    Anticipated Discharge Disposition Home with Outpatient Therapy   Risk & Benefits of therapy have been explained Yes   Patient, Family & other staff in agreement with plan of care Yes   Total Evaluation Time   Total Evaluation Time (Minutes) 20

## 2020-08-25 NOTE — CONSULTS
Care Transition Initial Assessment - RN  Met with: Patient and Family.  DATA   Principal Problem:    Benign paroxysmal positional vertigo of left ear  Active Problems:    Aortic valve disorder    HTN (hypertension)    COPD, moderate (H)    Mitral valve insufficiency, unspecified etiology    Pulmonary hypertension (H)    Mitral valve stenosis, unspecified etiology    Palpitations    Thrombocytopenia (H)    Neutropenia, unspecified type (H)    Syncope       Cognitive Status: alert and oriented.        Contact information and PCP information verified: Yes  Lives With: (P) child(brown), adult(daughter)   Living Arrangements: (P) ([facility])  Quality of Family Relationships: helpful, involved, supportive      Who is your support system?: Children       Insurance concerns: No Insurance issues identified  ASSESSMENT  Patient currently receives the following services:   None        Identified issues/concerns regarding health management:  Discussed discharge plans with patients daughter. Daughter states patient lives with her and feels they will be able to manage. Requested writer to schedule appointments. Patient will be discharging with an event monitor.   Appointments with primary Cardiology and Vestibular therapy has been scheduled and is in AVS.   PLAN  Financial costs for the patient include  None .  Patient given options and choices for discharge yes .  Patient/family is agreeable to the plan? yes   Patient anticipates discharging to home .        Patient anticipates needs for home equipment: No  Transportation/person available to transport on day of discharge  is  Mother and have they been notified/set up   Plan/Disposition: Home   Appointments:  Completed and in AVS      Care  (CTS) will continue to follow as needed.

## 2020-08-25 NOTE — PROGRESS NOTES
DATE & TIME: 08/24/2020 Night    Cognitive Concerns/ Orientation : Alert/oriented x 4; Neuros intact. Amheric speaking (no English). Jabber used as necessary. During day daughter helps with interpreting   BEHAVIOR & AGGRESSION TOOL COLOR: green  CIWA SCORE: n/a   ABNL VS/O2: VSS, room air  MOBILITY: SBA, gait belt  PAIN MANAGMENT: heat pack to arm  DIET: Regular, vegetarian  BOWEL/BLADDER: Continent, no BM this shift  ABNL LAB/BG: WBC 2.6  DRAIN/DEVICES: R PIV saline locked  TELEMETRY RHYTHM: NSR with First degree AV block  SKIN: Intact  TESTS/PROCEDURES: Vestibular PT for vertigo consult prior to discharge  D/C DAY/GOALS/PLACE: Possibly 8/25 after PT consult. Daughter available for transport.  OTHER IMPORTANT INFO: Pt dizzy this shift-PRN Meclizine (TID) given. Daughter to arrive early in am to assist with interpreting.   MD/RN ROUNDING SIGNED OFF D/E SHIFT: n/a  COMMIT TO SIT DONE AND SIGNED OFF YES  OBSERVATION GOALS:   -diagnostic tests and consults completed and resulted--NOT MET   -vital signs normal or at patient baseline--MET   -tolerating oral intake to maintain hydration--MET

## 2020-08-26 ENCOUNTER — TELEPHONE (OUTPATIENT)
Dept: CARDIOLOGY | Facility: CLINIC | Age: 80
End: 2020-08-26

## 2020-08-26 NOTE — TELEPHONE ENCOUNTER
Patient was evaluated by cardiology while inpatient for dizziness/vertigo. Called patient's daughter to discuss any post hospital d/c questions she may have, review medication changes, and confirm f/u appts. Patient's daughter denied any questions regarding new medications or changes to PTA medications. Patient denied any SOB, chest pain, or light headedness. RN confirmed with patient's daughter that patient has an apt scheduled on 9/18/20 with Dr. Khanna. Patient's daughter advised to call clinic with any cardiac related questions or concerns prior to this cesar't. Patient verbalized understanding and agreed with plan.           The above information was delivered utilizing an Tajik interpretor      8/23//20 cardiology progress note  IMPRESSION:   1.  Vertigo, patient denies syncope.   2.  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  Normal left ventricular systolic function.     3.  Asthma.      This delightful lady presents with vertigo.  She denies syncope.  I think this may be either due to vestibular neuronitis or benign positional vertigo.  EKG does not show any acute changes.  A recent Zio Patch monitor showed runs of PSVT, which could certainly be a normal finding in the elderly.      We will sign off at this time.  She has continued followup in our clinic.      HISTORY OF PRESENT ILLNESS:  A delightful 79-year-old [place]ese  nun who has been seen by myself in clinic in 2012 and 2017 for rheumatic heart disease.  She was seen just recently by my colleague, Dr. Hermilo Holm.  She was stable at that time.      This lady has no history of previous syncope or dizzy spells.  She does have asthma and is occasionally short of breath; however, she denies any shortness of breath or chest pains prior to admission.  In fact, her health has been good prior to admission.       She woke up this morning and she felt the room was spinning around.  She went and had a little breakfast and she drank some holy water.  She then went back and laid down on her bed and a spinning sensation continued.  She did not fall and she tells me that she did not lose consciousness at all.  She denies nausea and vomiting.  No recent fevers or other bowel or urinary disturbances.

## 2020-08-27 LAB — INTERPRETATION ECG - MUSE: NORMAL

## 2020-08-28 ENCOUNTER — TELEPHONE (OUTPATIENT)
Dept: FAMILY MEDICINE | Facility: CLINIC | Age: 80
End: 2020-08-28

## 2020-08-28 NOTE — TELEPHONE ENCOUNTER
Reason for Call:  Same Day Appointment, Requested Provider:  Hospital follow up    PCP: Lavelle Aj    Reason for visit: Hospital follow up in person with Dr. Aj    Duration of symptoms: N/A    Have you been treated for this in the past? Yes    Additional comments: Pt was hospitalized and needs a hospital follow up. Greatly prefers it to be with Dr. Aj. Pt's daughter notes PT also needs to talk about her medication because it was changed at the hospital. Meds changed were Metoprolol and the other one is for blood (pt doesn't remember the name).     Can we leave a detailed message on this number? YES    Phone number patient can be reached at: Other phone number:  [phone]    Best Time: ANY    Call taken on 8/28/2020 at 10:50 AM by Tisha Medina

## 2020-09-02 ENCOUNTER — OFFICE VISIT (OUTPATIENT)
Dept: FAMILY MEDICINE | Facility: CLINIC | Age: 80
End: 2020-09-02
Payer: COMMERCIAL

## 2020-09-02 ENCOUNTER — HOSPITAL ENCOUNTER (OUTPATIENT)
Dept: PHYSICAL THERAPY | Facility: CLINIC | Age: 80
Setting detail: THERAPIES SERIES
End: 2020-09-02
Attending: INTERNAL MEDICINE
Payer: COMMERCIAL

## 2020-09-02 VITALS
SYSTOLIC BLOOD PRESSURE: 140 MMHG | HEIGHT: 60 IN | TEMPERATURE: 96.7 F | BODY MASS INDEX: 24.07 KG/M2 | OXYGEN SATURATION: 96 % | DIASTOLIC BLOOD PRESSURE: 69 MMHG | HEART RATE: 73 BPM | WEIGHT: 122.6 LBS

## 2020-09-02 DIAGNOSIS — E55.9 VITAMIN D DEFICIENCY: ICD-10-CM

## 2020-09-02 DIAGNOSIS — R55 SYNCOPE, UNSPECIFIED SYNCOPE TYPE: ICD-10-CM

## 2020-09-02 DIAGNOSIS — M81.0 AGE-RELATED OSTEOPOROSIS WITHOUT CURRENT PATHOLOGICAL FRACTURE: ICD-10-CM

## 2020-09-02 DIAGNOSIS — R73.01 IFG (IMPAIRED FASTING GLUCOSE): ICD-10-CM

## 2020-09-02 DIAGNOSIS — M81.0 SENILE OSTEOPOROSIS: ICD-10-CM

## 2020-09-02 DIAGNOSIS — I10 ESSENTIAL HYPERTENSION: ICD-10-CM

## 2020-09-02 DIAGNOSIS — H81.10 BENIGN PAROXYSMAL POSITIONAL VERTIGO, UNSPECIFIED LATERALITY: ICD-10-CM

## 2020-09-02 DIAGNOSIS — R00.2 PALPITATIONS: ICD-10-CM

## 2020-09-02 DIAGNOSIS — I05.0 MITRAL VALVE STENOSIS, UNSPECIFIED ETIOLOGY: Primary | ICD-10-CM

## 2020-09-02 DIAGNOSIS — J44.9 COPD, MODERATE (H): ICD-10-CM

## 2020-09-02 DIAGNOSIS — D50.9 IRON DEFICIENCY ANEMIA, UNSPECIFIED IRON DEFICIENCY ANEMIA TYPE: ICD-10-CM

## 2020-09-02 DIAGNOSIS — I35.9 AORTIC VALVE DISORDER: ICD-10-CM

## 2020-09-02 PROCEDURE — 99495 TRANSJ CARE MGMT MOD F2F 14D: CPT | Performed by: INTERNAL MEDICINE

## 2020-09-02 PROCEDURE — 97161 PT EVAL LOW COMPLEX 20 MIN: CPT | Mod: GP | Performed by: PHYSICAL THERAPIST

## 2020-09-02 RX ORDER — CHOLECALCIFEROL (VITAMIN D3) 50 MCG
50 TABLET ORAL DAILY
Qty: 100 TABLET | Refills: 3 | Status: SHIPPED | OUTPATIENT
Start: 2020-09-02

## 2020-09-02 RX ORDER — METOPROLOL SUCCINATE 50 MG/1
50 TABLET, EXTENDED RELEASE ORAL DAILY
Qty: 90 TABLET | Refills: 3 | Status: SHIPPED | OUTPATIENT
Start: 2020-09-02

## 2020-09-02 RX ORDER — AMOXICILLIN 250 MG
1 CAPSULE ORAL 2 TIMES DAILY
Qty: 180 TABLET | Refills: 3 | Status: SHIPPED | OUTPATIENT
Start: 2020-09-02

## 2020-09-02 RX ORDER — IBANDRONATE SODIUM 150 MG/1
TABLET, FILM COATED ORAL
Qty: 3 TABLET | Refills: 3 | Status: SHIPPED | OUTPATIENT
Start: 2020-09-02

## 2020-09-02 ASSESSMENT — ANXIETY QUESTIONNAIRES
7. FEELING AFRAID AS IF SOMETHING AWFUL MIGHT HAPPEN: NOT AT ALL
5. BEING SO RESTLESS THAT IT IS HARD TO SIT STILL: NOT AT ALL
IF YOU CHECKED OFF ANY PROBLEMS ON THIS QUESTIONNAIRE, HOW DIFFICULT HAVE THESE PROBLEMS MADE IT FOR YOU TO DO YOUR WORK, TAKE CARE OF THINGS AT HOME, OR GET ALONG WITH OTHER PEOPLE: NOT DIFFICULT AT ALL
1. FEELING NERVOUS, ANXIOUS, OR ON EDGE: NOT AT ALL
3. WORRYING TOO MUCH ABOUT DIFFERENT THINGS: SEVERAL DAYS
2. NOT BEING ABLE TO STOP OR CONTROL WORRYING: NOT AT ALL
6. BECOMING EASILY ANNOYED OR IRRITABLE: NOT AT ALL
GAD7 TOTAL SCORE: 1

## 2020-09-02 ASSESSMENT — PATIENT HEALTH QUESTIONNAIRE - PHQ9: 5. POOR APPETITE OR OVEREATING: NOT AT ALL

## 2020-09-02 ASSESSMENT — MIFFLIN-ST. JEOR: SCORE: 962.61

## 2020-09-02 NOTE — PROGRESS NOTES
Subjective     Wilma Lorenzana is a 79 year old female who presents to clinic today for the following health issues:     HPI         Hospital Follow-up Visit:    Hospital/Nursing Home/IP Rehab Facility: [facility]  Date of Admission: 08/23/2020  Date of Discharge: 08/25/2020  Reason(s) for Admission:   Left BPPV  Syncope  Mitral stenosis      Was your hospitalization related to COVID-19? No   Problems taking medications regularly:  None  Medication changes since discharge: None  Problems adhering to non-medication therapy:  None    Summary of hospitalization:  [facility] discharge summary reviewed  Diagnostic Tests/Treatments reviewed.  Follow up needed: cardiology   Other Healthcare Providers Involved in Patient s Care:         None  Update since discharge: improved. Post Discharge Medication Reconciliation: discharge medications reconciled, continue medications without change.  Plan of care communicated with patient             Essential hypertension  Mitral valve stenosis, unspecified etiology  Aortic valve disorder  Palpitations  Senile osteoporosis  Vitamin D deficiency  Iron deficiency anemia, unspecified iron deficiency anemia type  Age-related osteoporosis without current pathological fracture  IFG (impaired fasting glucose)  COPD, moderate (H)   Wilma was recently hospitalized for dizziness related to benign paroxysmal positional vertigo and was thoroughly evaluated for cardiac cause of her dizziness and possible syncope.  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  She also was recommended to have a 14-day event monitor which is still in place.  Her blood pressure meds were adjusted slightly to resume metoprolol which she was not taking prior to admission and to stop losartan.  With these medication changes she is still had stable blood pressure, although it is a bit elevated today.  In talking with her daughter, she  has not had any worsening cough since making the switch from lisinopril to losartan and since being more vigilant with taking her inhalers.  She is a bit worried about her mother's balance and risk of falling and would like an occupational therapy assessment to see if she could qualify for use of assistive device.    Review of Systems   Constitutional, HEENT, cardiovascular, pulmonary - as above , gi and gu systems are negative, except as otherwise noted.      Objective    BP (!) 140/69 (BP Location: Left arm, Patient Position: Sitting, Cuff Size: Adult Regular)   Pulse 73   Temp 96.7  F (35.9  C) (Tympanic)   Ht 1.524 m (5')   Wt 55.6 kg (122 lb 9.6 oz)   SpO2 96%   BMI 23.94 kg/m    Body mass index is 23.94 kg/m .  Physical Exam   GENERAL: healthy, alert and no distress  EYES: Eyes grossly normal to inspection   SKIN: no suspicious lesions or rashes  NEURO: Normal strength and tone, mentation intact and speech normal  PSYCH: mentation appears normal, affect normal/bright            Assessment & Plan     Essential hypertension  Blood pressure is stable, but a bit elevated.  We discussed possibly resuming losartan, but she declined this and would like to continue metoprolol at current dose of 50 mg.  We will continue to monitor and plan to follow-up in 2 weeks for video visit  - metoprolol succinate ER (TOPROL-XL) 50 MG 24 hr tablet; Take 1 tablet (50 mg) by mouth daily  - OCCUPATIONAL THERAPY REFERRAL; Future    Mitral valve stenosis, unspecified etiology  Follow up in cardiology   - OCCUPATIONAL THERAPY REFERRAL; Future    Aortic valve disorder  Follow up in cardiology   - OCCUPATIONAL THERAPY REFERRAL; Future    Palpitations  Aspirin  - event monitor pending  - OCCUPATIONAL THERAPY REFERRAL; Future    Senile osteoporosis  Continue Boniva  - IBANdronate (BONIVA) 150 MG tablet; TAKE 1 TABLET BY MOUTH EVERY 30 DAYS 1 HR BEFORE BREAKFAST WITH 8OZ OF WATER AND SIT UPRIGHT 30 MIN  - OCCUPATIONAL THERAPY REFERRAL;  Future    Vitamin D deficiency  Continue vitamin D   - vitamin D3 (CHOLECALCIFEROL) 50 mcg (2000 units) tablet; Take 1 tablet (50 mcg) by mouth daily  - OCCUPATIONAL THERAPY REFERRAL; Future    Iron deficiency anemia, unspecified iron deficiency anemia type  Continue oral iron  - senna-docusate (SENOKOT-S/PERICOLACE) 8.6-50 MG tablet; Take 1 tablet by mouth 2 times daily  - OCCUPATIONAL THERAPY REFERRAL; Future    Age-related osteoporosis without current pathological fracture  As above   - senna-docusate (SENOKOT-S/PERICOLACE) 8.6-50 MG tablet; Take 1 tablet by mouth 2 times daily  - OCCUPATIONAL THERAPY REFERRAL; Future    IFG (impaired fasting glucose)  Monitor diet closely  - senna-docusate (SENOKOT-S/PERICOLACE) 8.6-50 MG tablet; Take 1 tablet by mouth 2 times daily  - OCCUPATIONAL THERAPY REFERRAL; Future    COPD, moderate (H)  Continue inhalers  - senna-docusate (SENOKOT-S/PERICOLACE) 8.6-50 MG tablet; Take 1 tablet by mouth 2 times daily  - OCCUPATIONAL THERAPY REFERRAL; Future     Dizziness  Plan for evaluation today in vestibular clinic.  Also referral to Occupational Therapy for assessment for assistive device        No follow-ups on file.    Lavelle Aj MD, MD  [facility]

## 2020-09-02 NOTE — PATIENT INSTRUCTIONS
(I05.0) Mitral valve stenosis, unspecified etiology  (primary encounter diagnosis)  Comment: Continue losartan and Toprol and plan to follow up in cardiology   Plan:     (I10) Essential hypertension  Comment: blood pressure is a bit elevated - would recommend resuming losartan in addition to metoprolol  Plan: metoprolol succinate ER (TOPROL-XL) 50 MG 24 hr        tablet            (I35.9) Aortic valve disorder  Comment: as above   Plan:     (R00.2) Palpitations  Comment: Plan to review event monitor with cardiology   Plan:

## 2020-09-02 NOTE — PROGRESS NOTES
09/02/20 1541   Quick Adds   Quick Adds Vestibular Eval   Type of Visit Initial OP PT Evaluation       Present Yes   Language Other  (Czech, video )   General Information   Start of Care Date 09/02/20   Referring Physician David Anderson MD    Orders Evaluate and Treat as Indicated   Order Date 08/25/20   Medical Diagnosis Benign paroxysmal positional vertigo, unspecified laterality H81.10    Onset of illness/injury or Date of Surgery 08/23/20   Precautions/Limitations fall precautions   Surgical/Medical history reviewed Yes   Pertinent history of current problem (include personal factors and/or comorbidities that impact the POC) Pt reports acute onset vertigo that began on 8/23.  Recently hospitalized for dizziness related to benign paroxysmal positional vertigo and was thoroughly evaluated for cardiac cause of her dizziness and possible syncope.  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  She was ordered to wear an event monitor and BP meds were adjusted slightly.  Of note, chart also indicates concern for balance and possible need for AD assessment/training. Appears she was treated with R dixhallpike during the hospitalization. Patient reports full resolution of dizziness concerns since return home.   Pertinent Visual History  B cataract   Prior level of function comment Independent PLOF, uses SEC. Lives iwth daughter, daughter provides supervision. Has been providing more assistance/care recently out of caution.   Current Community Support   (daughter)   Patient role/Employment history Retired   Living environment House/townhome   [facility], lives [place] daughter, railings present   Current Assistive Devices Standard Cane   Patient/Family Goals Statement ensure resolution of dizziness/BPPV   Fall Risk Screen   Fall screen completed by PT   Have you fallen 2 or more  times in the past year? No   Have you fallen and had an injury in the past year? No   Is patient a fall risk? Yes   Fall screen comments uses cane to compensate   Functional Scales   Functional Scales and Outcomes DHI: 2/100   Pain   Patient currently in pain Yes   Pain location chronic/ongoing mild neck pain   Cognitive Status Examination   Orientation orientation to person, place and time   Cognitive Comment assessed through /daughter.   Posture   Posture Forward head position   Range of Motion (ROM)   ROM Comment WFL LEs and UEs, limited cervical rotation, extension, flexion ~50%   Strength   Strength Comments WFL and symmetrical strength, mild generalized deconditioning/proximal hip weakness noted.  5/5 B ankle DF, knee ext, 4-/5 B hip flexion, 4+/5 B shoulder flexion, neck pain reported.   Bed Mobility   Bed Mobility Comments Min A, pt denies dizziness   Transfer Skills   Transfer Comments Independent sit <> stand transfers, mild use of hand support.   Gait   Gait Comments Amb with SEC in R hand, slowed gait speed, reduced foot clearance and push off noted. Daughter reports this is patient's baseline gait status.   Balance   Balance Comments WFL static standing balance, but variable performance. Difficulty initially with rhomberg EC, improved with practice. Generalized balance concerns noted, appears to compensate well with use of SEC.   Balance Special Tests   Balance Special Tests Romberg   Balance Special Tests Romberg   Seconds 30 Seconds   Comments 30'' EO, variable tolerance with EC ranging 5''-30'' with practice, mild sway   Sensory Examination   Sensory Perception no deficits were identified   Coordination   Coordination no deficits were identified   Cervicogenic Screen   Neck ROM limited AROM ~50% all planes   Oculomotor Exam   Smooth Pursuit Abnormal   Smooth Pursuit Comment saccadic eye movements noted, possibly due to age and/or cataracts   VOR Other   VOR Comments appears generally  intact, but does come off target at times, unclear if due to language barrier/attention   Rapid Head Thrust Normal   Rapid Head Thrust Comments appears generally intact, some difficulty following commands due to language barrier   Infrared Goggle Exam or Frenzel Lense Exam   Vestibular Suppressant in Last 24 Hours? No   Exam completed with Infrared Goggles   Spontaneous Nystagmus Horizontal R   Spontaneous Nystagmus comments mild R beating nysagmus   Gaze Evoked Nystagmus Horizontal R   Gaze Evoked Nystagmus comments does not seem to change with gaze directionality   Head Shake Horizontal Nystagmus Other   Head Shake Horizontal Nystagmus comments R beating nystagmus, pt asymptomatic with regard to dizziness, but limited by L sided neck pain so deferred further assessment   Positional Testing comments asymptomatic   Clare-Hallpike (right) Negative;Horizontal R   Clare-Hallpike (right) comments mild underlying R beating nystagmus, persists > 60'', asymptomatic   Dinosaur-Hallpike (Left) Negative;Horizontal R   Dinosaur-Hallpike (left) comments mild underlying R beating nystagmus, persists > 60'', asymptomatic   HSCC Supine Roll Test (Right) Negative;Other   HSCC Supine Roll Test (Right) Comments mild underlying R beating nystagmus, persists > 60'', asymptomatic   HSCC Supine Roll Test (Left) Negative;Other   HSCC Supine Roll Test (Left) Comments  mild underlying R beating nystagmus, persists > 60'', asymptomatic   Clinical Impression   Criteria for Skilled Therapeutic Interventions Met evaluation only;current level of function same as previous level of function;no significant expected improvement in functional status   Clinical Presentation Stable/Uncomplicated   Clinical Presentation Rationale improving symptoms, age, PMH, PLOF   Clinical Decision Making (Complexity) Low complexity   Risk & Benefits of therapy have been explained Yes   Patient, Family & other staff in agreement with plan of care Yes   Clinical Impression Comments  Unable to reproduce dizziness/vertigo concerns in clinic today.  Appears interventions during hospitalization (canalith repositioning maneuver for presumed BPPV) was successful.  Patient and family educated on resuming baseline activity status although advised strict long-term use of SEC to reduce fall risk and optimize safety/independence. Encouraged hydration, walking program and reviewed basic strengthening HEP including stairs with railing support, walking program with SEC, and sit <> stand strengthening exercises.  She may benefit from ongoing skilled PT to address conditioning/strengthening, balance, and gait training, although patient and daughter decline need for this currently as patient appears to be resolving to baseline functional mobility status currently.  Ed on possibility of BPPV recurrence; advised seeking medical attn if vertigo returns, seek new OP PT referral if needed.   Total Evaluation Time   PT Kamlesh, Low Complexity Minutes (19572) 46

## 2020-09-03 ASSESSMENT — ANXIETY QUESTIONNAIRES: GAD7 TOTAL SCORE: 1

## 2020-09-18 ENCOUNTER — VIRTUAL VISIT (OUTPATIENT)
Dept: FAMILY MEDICINE | Facility: CLINIC | Age: 80
End: 2020-09-18
Payer: COMMERCIAL

## 2020-09-18 ENCOUNTER — DOCUMENTATION ONLY (OUTPATIENT)
Dept: CARDIOLOGY | Facility: CLINIC | Age: 80
End: 2020-09-18

## 2020-09-18 VITALS — SYSTOLIC BLOOD PRESSURE: 114 MMHG | DIASTOLIC BLOOD PRESSURE: 66 MMHG

## 2020-09-18 DIAGNOSIS — I10 ESSENTIAL HYPERTENSION: ICD-10-CM

## 2020-09-18 DIAGNOSIS — H81.12 BENIGN PAROXYSMAL POSITIONAL VERTIGO OF LEFT EAR: Primary | ICD-10-CM

## 2020-09-18 PROCEDURE — 99213 OFFICE O/P EST LOW 20 MIN: CPT | Mod: GT | Performed by: INTERNAL MEDICINE

## 2020-09-18 NOTE — PROGRESS NOTES
The patient did not show for a cardiology clinic appointment today.    Dr. TIERA Khanna MD [facility]  Cardiology.  [facility].

## 2020-09-18 NOTE — PROGRESS NOTES
"Wilma Lorenzana is a 79 year old female who is being evaluated via a billable video visit.      The patient has been notified of following:     \"This video visit will be conducted via a call between you and your physician/provider. We have found that certain health care needs can be provided without the need for an in-person physical exam.  This service lets us provide the care you need with a video conversation.  If a prescription is necessary we can send it directly to your pharmacy.  If lab work is needed we can place an order for that and you can then stop by our lab to have the test done at a later time.    Video visits are billed at different rates depending on your insurance coverage.  Please reach out to your insurance provider with any questions.    If during the course of the call the physician/provider feels a video visit is not appropriate, you will not be charged for this service.\"    Patient has given verbal consent for Video visit? Yes  How would you like to obtain your AVS? Mail a copy  If you are dropped from the video visit, the video invite should be resent to: Text to cell phone: [phone]  Will anyone else be joining your video visit? No  {If patient encounters technical issues they should call [phone] :375645}    Subjective     Wilma Lorenzana is a 79 year old female who presents today via video visit for the following health issues:    HPI    {SUPERLIST (Optional):213415}  {PEDS Chronic and Acute Problems (Optional):208888}     Video Start Time: {video visit start/end time for provider to select:978845}    {additonal problems for provider to add (Optional):615328}    Review of Systems   {ROS COMP (Optional):764344}      Objective           Vitals:  No vitals were obtained today due to virtual visit.    Physical Exam     {video visit exam brief selected:659242::\"GENERAL: Healthy, alert and no distress\",\"EYES: Eyes grossly normal to inspection.  No discharge or erythema, or " "obvious scleral/conjunctival abnormalities.\",\"RESP: No audible wheeze, cough, or visible cyanosis.  No visible retractions or increased work of breathing.  \",\"SKIN: Visible skin clear. No significant rash, abnormal pigmentation or lesions.\",\"NEURO: Cranial nerves grossly intact.  Mentation and speech appropriate for age.\",\"PSYCH: Mentation appears normal, affect normal/bright, judgement and insight intact, normal speech and appearance well-groomed.\"}      {Diagnostic Test Results (Optional):363444}        {PROVIDER CHARTING PREFERENCE:959199}      Video-Visit Details    Type of service:  Video Visit    Video End Time:{video visit start/end time for provider to select:152948}    Originating Location (pt. Location): {video visit patient location:385756::\"Home\"}    Distant Location (provider location):  [facility]     Platform used for Video Visit: {Virtual Visit Platforms:489857::\"Virgance\"}          "

## 2020-09-18 NOTE — PROGRESS NOTES
"Wilma Lorenzana is a 79 year old female who is being evaluated via a billable telephone visit.      The patient has been notified of following:     \"This video visit will be conducted via a call between you and your physician/provider. We have found that certain health care needs can be provided without the need for an in-person physical exam.  This service lets us provide the care you need with a video conversation.  If a prescription is necessary we can send it directly to your pharmacy.  If lab work is needed we can place an order for that and you can then stop by our lab to have the test done at a later time.    Video visits are billed at different rates depending on your insurance coverage.  Please reach out to your insurance provider with any questions.    If during the course of the call the physician/provider feels a video visit is not appropriate, you will not be charged for this service.\"    Patient has given verbal consent for Video visit? Yes  How would you like to obtain your AVS? MyChart  If you are dropped from the video visit, the video invite should be resent to: Text to cell phone: [phone]  Will anyone else be joining your video visit? No      Subjective     Wilma Lorenzana is a 79 year old female who presents today via video visit for the following health issues:    HPI           Video Start Time: 12:37 PM    Hypertension   I spoke with her daughter Janelle and we reviewd blood pressure readings 120-130's/50-70's with current dose of metoprolol and does not need to resume losartan at this time.  Benign paroxysmal positional vertigo   She is doing fine and no further dizziness and she did have a follow up in physical therapy and advised to monitor.      Review of Systems   Constitutional, HEENT, cardiovascular, pulmonary, gi and gu systems are negative, except as otherwise noted.      Objective           Vitals:  No vitals were obtained today due to virtual visit.    Physical Exam "     deferred      No results found for this or any previous visit (from the past 24 hour(s)).        Assessment & Plan     Benign paroxysmal positional vertigo of left ear  We will refer for occupational therapy assessment to fit for walker and wheel chair    Essential hypertension  Blood pressure is stable with just metoprolol and we will continue to hold losartan       13 minutes spent with patient.  Over 50% of time counseling     No follow-ups on file.    Lavelle Aj MD, MD  [facility]      telephone -Visit Details    Type of service:  Video Visit    Video End Time:12:47 PM    Originating Location (pt. Location): Home    Distant Location (provider location):  [facility]     Platform used for Video Visit: Add2paper

## 2020-09-22 ENCOUNTER — PATIENT OUTREACH (OUTPATIENT)
Dept: GERIATRIC MEDICINE | Facility: CLINIC | Age: 80
End: 2020-09-22

## 2020-09-22 NOTE — PROGRESS NOTES
[facility] Care Coordination Contact    CC called to daughter [employer]le[employer] regarding care coordination referral from the clinic and inquiring regarding equipment needs. Unable to leave a voice mail message as her voicemail box is full. Will try a later time.       Dileep Jorge RN Care Coordinator  [facility]  Office: [phone]  Fax: [phone]  [email or web address]

## 2020-09-22 NOTE — PROGRESS NOTES
[facility] Care Coordination Contact    CC received a phone call from daughter Sergio, discussed regarding care coordination referral for equipment needs. Discussed regarding walker and wheelchair. Explained the process of getting the equipments with PCP and insurance approval. She would like to see what the walker looks like before agreeing and if interested in zipped pouch or basket, that it will be private pay as member is not open to EW. She voiced understanding and requested the picture to be email to [email or web address] with the pricing.         Dileep Jorge RN Care Coordinator  [facility]  Office: [phone]  Fax: [phone]  [email or web address]

## 2020-10-08 ENCOUNTER — TELEPHONE (OUTPATIENT)
Dept: FAMILY MEDICINE | Facility: CLINIC | Age: 80
End: 2020-10-08

## 2020-10-08 DIAGNOSIS — I10 ESSENTIAL HYPERTENSION: Primary | ICD-10-CM

## 2020-10-08 DIAGNOSIS — H81.12 BENIGN PAROXYSMAL POSITIONAL VERTIGO OF LEFT EAR: ICD-10-CM

## 2020-10-08 DIAGNOSIS — M51.379 DEGENERATION OF LUMBAR OR LUMBOSACRAL INTERVERTEBRAL DISC: ICD-10-CM

## 2020-10-08 NOTE — TELEPHONE ENCOUNTER
Walker with seat and BP monitoring machine have been requested by the patient. DME orders(s) have been queued up and pended for the provider to review. Patient reports the need for DME supplies due to Syncope, degeneration of lumbar for walker and HTN for the BP monitoring machine. Once completed, please route the encounter to care coordinator to fax completed documentation and order requisition to [facility].       Dileep Jorge RN Care Coordinator  [facility]  Office: [phone]  Fax: [phone]  [email or web address]

## 2020-10-08 NOTE — PROGRESS NOTES
[facility] Care Coordination Contact    CC called to daughter on 10/7/20, she stated she will call back.    CC received a returned call from daughter stating she would like to order walker with seat and BP machine. CC explained the upgrade charge for the 4ww walker with seat. Will request RXs from PCP.       Dileep Jorge RN Care Coordinator  [facility]  Office: [phone]  Fax: [phone]  [email or web address]

## 2020-10-08 NOTE — TELEPHONE ENCOUNTER
CC emailed both signed orders and progress note to [facility].      Dileep Jorge RN Care Coordinator  [facility]  Office: [phone]  Fax: [phone]  [email or web address]

## 2020-11-04 ENCOUNTER — PATIENT OUTREACH (OUTPATIENT)
Dept: GERIATRIC MEDICINE | Facility: CLINIC | Age: 80
End: 2020-11-04

## 2020-11-04 NOTE — PROGRESS NOTES
[facility] Care Coordination Contact    Called adult daughter Glory to schedule annual HRA home visit. HRA has been scheduled for 11/12 at 4:30pm.Daughter has declined professional  at this time. CC explained there is a form to sign and to be mail to her at the EOV. She voiced understanding.     Dileep Jorge RN Care Coordinator  [facility]  Office: [phone]  Fax: [phone]  [email or web address]

## 2020-11-11 NOTE — PROGRESS NOTES
Per [facility], CC notified.  Harriett, Wilma 1940 2 wheeled walker w/seat & bp unit 10/8/2020 DELIVERED     Cally Srivastava  Case Management Specialist  [facility]  [phone]

## 2020-11-12 ENCOUNTER — PATIENT OUTREACH (OUTPATIENT)
Dept: GERIATRIC MEDICINE | Facility: CLINIC | Age: 80
End: 2020-11-12

## 2020-11-12 ASSESSMENT — ACTIVITIES OF DAILY LIVING (ADL)
DEPENDENT_IADLS:: CLEANING;COOKING;LAUNDRY;SHOPPING;MEAL PREPARATION;MEDICATION MANAGEMENT;MONEY MANAGEMENT;TRANSPORTATION

## 2020-11-13 ENCOUNTER — TELEPHONE (OUTPATIENT)
Dept: FAMILY MEDICINE | Facility: CLINIC | Age: 80
End: 2020-11-13

## 2020-11-13 NOTE — TELEPHONE ENCOUNTER
Tens unit has been requested by the patient. DME orders(s) have been queued up and pended for the provider to review. Patient reports the need for DME supplies due to neck and back pain . Once completed, please route the encounter to care coordinator to fax completed documentation and order requisition to [facility].     She will need a F2F or a virtual visit for this request of DME pending insurance approval. The daughter will reach out to schedule.    Thank you,      Dileep Jorge RN Care Coordinator  [facility]  Office: [phone]  Fax: [phone]  [email or web address]

## 2020-11-17 NOTE — PROGRESS NOTES
[facility] Care Coordination Contact    [facility] Home Visit Assessment     Annual Health Risk Assessment with Wilma Lorenzana completed on November 17, 2020. Assessment was done by phone due to COVID 19 concerns and restrictions on home visits during this time period.    Type of residence:: Private home - stairs  Current living arrangement:: I live in a private home with family     Assessment completed with:: Patient, daughter and this CC.    Current Care Plan  Member currently receiving the following home care services: n/a     Member currently receiving the following community resources: Transportation service.     Medication Review  Medication reconciliation completed in Epic: Yes  Medication set-up & administration: Family/informal caregiver sets up weekly.  Self-administers medications.  Medication Risk Assessment Medication (1 or more, place referral to MTM): N/A: No risk factors identified  MTM Referral Placed: No: No risk factors idenified    Mental/Behavioral Health   Depression Screening: Denies depression. Mood appropriate.     Mental health DX:: No        Falls Assessment:   Fallen 2 or more times in the past year?: No   Any fall with injury in the past year?: No  Has a 2 wheels rolling walker with seat that member is using with her mobility.     ADL/IADL Dependencies:   Dependent ADLs:: Ambulation-walker  Dependent IADLs:: Cleaning, Cooking, Laundry, Shopping, Meal Preparation, Medication Management, Money Management, Transportation    [facility] Plan sponsored benefits: Shared information re: Silver Sneakers/gym memberships, ASA, Calcium +D.    PCA Assessment completed at visit: Not Applicable     Elderly Waiver Eligibility: No-does not meet criteria    Care Plan & Recommendations:   1. Member's goal is to continue to live independently in the community with her daughter's support.   2. To get a TENS unit for pain management. Will need F2F with PCP. Daughter has been educated to  schedule an appt with PCP.  3. Check BP daily due to increased BPs and change in medications.   4. Will discuss with member regarding completion of HCD.     See [facility] for detailed assessment information.    Follow-Up Plan: Member informed of future contact, plan to f/u with member with a 6 month telephone assessment.  Contact information shared with member and family, encouraged member to call with any questions or concerns at any time.    [place] care continuum providers: Please refer to Health Care Home on the Epic Problem List to view this patient's [facility] Care Plan Summary.    Dileep Jorge RN Care Coordinator  [facility]  Office: [phone]  Fax: [phone]  [email or web address]

## 2020-11-18 ENCOUNTER — PATIENT OUTREACH (OUTPATIENT)
Dept: GERIATRIC MEDICINE | Facility: CLINIC | Age: 80
End: 2020-11-18

## 2020-11-18 NOTE — LETTER
November 18, 2020    Important [employer] Information    WILMA JONATAN [facility]  [address]  [place] [address]  Your Care Plan  Dear Wilma,  When we spoke recently, I promised to send you a Care Plan. The plan enclosed is a summary of our discussion. It includes the steps we agreed would help you meet your health goals. In addition, I can help you with:  [employer]-VlzbNP  This program is available to members who need a ride to medical and dental visits. To schedule a ride, call [phone] or [phone] (toll free). TTY/TTD: 711. You can call 8 a.m. to 8 p.m. Seven days a week. Access to a representative may be limited at times.    Weblio   The Weblio program empowers you to improve your health through education and exercise. To learn more, visit [employer], or call [employer] Service at [phone] (toll free) (TTY:711) from 7 a.m. - 7 p.m. Central Time, Monday-Friday.  Health Care Directive   This form helps you outline your health care wishes. You can request a form from me and I will answer any questions you have before you discuss it with your doctor.   Annual Physical  Take a key step on your path to good health and set up an annual physical at your clinic.  Questions?  Call me at [phone] Monday-Friday between 8am and 5pm.  TTY/TTD: 711. As we discussed, I plan to be in touch with you again in 6 months to follow up via phone.  Sincerely,    Dileep Jorge RN  [phone]  [email or web address]            cc: member records                    Civil Rights Notice  Discrimination is against the law. [employer] does not discriminate on the basis of any of the following:    Race    Color    National Origin    Creed    Sikh    Age    Public Assistance Status    Receipt of Health Care Services    Disability (including physical or mental impairment)    Sex (including sex stereotypes and gender identity)    Marital Status    Political Beliefs    Medical Condition    Genetic  Information    Sexual Orientation    Claims Experience    Medical History    Health Status    Auxiliary Aids and Services:  [employer] provides auxiliary aids and services, like qualified interpreters or information in accessible formats, free of charge and in a timely manner, to ensure an equal opportunity to participate in our health care programs. Contact [employer] Customer Service at [employer]/contact medicaid or call [phone] (toll free); TTY:711 or at [employer]/contactAeropostalecaid.    Language Assistance Services:  [employer] provides translated documents and spoken language interpreting, free of charge and in a timely manner, when language assistance services are necessary to ensure limited English speakers have meaningful access to our information and services. Contact [employer] at -[phone] (toll free); TTY: 71 or [employer]/contactmedicaid.     Civil Rights Complaints  You have the right to file a discrimination complaint if you believe you were treated in a discriminatory way by [employer]. You may contact any of the following four agencies directly to file a discrimination complaint.    U.S. Department of Health and Human Services  Office for Civil Rights (OCR)  You have the right to file a complaint with the OCR, a federal agency, if you believe you have been discriminated against because of any of the following:    Race    Disability    Color    Sex    National Origin    Age      Contact the OCR directly to file a complaint:         Director         U.S. Department of Health and Human Services  Office for Civil Rights         [address] [place] [address]         [phone] (Voice)         [phone] (TDD)         Complaint Portal - https://ocrportal.hhs.gov/ocr/portal/lobby.jsf     [place] Department of Human Rights (MDHR)  In [place], you have the right to file a complaint with the [facility] if you believe you have been discriminated against  because of any of the following:      Race    Color    National Origin    Adventism    Creed    Sex    Sexual Orientation    Marital Status    Public Assistance Status    Disability    Contact the MD directly to file a complaint:         [place] Department of Human Rights         [facility], [address] [place] [address]         [phone] (voice)          [phone] (toll free)         711 or [phone] ([place] Relay)         [phone] (Fax)         [email or web address] (Email)     [place] Department of Human Services (DHS)  You have the right to file a complaint with [facility] if you believe you have been discriminated against in our health care programs because of any of the following:    Race    Color    National Origin    Creed    Adventism    Age    Public Assistance Status    Receipt of Health Care Services    Disability (including physical or mental impairment)    Sex (including sex stereotypes and gender identity)    Marital Status    Political Beliefs    Medical Condition    Genetic Information    Sexual Orientation    Claims Experience    Medical History    Health Status    Complaints must be in writing and filed within 180 days of the date you discovered the alleged discrimination. The complaint must contain your name and address and describe the discrimination you are complaining about. After we get your complaint, we will review it and notify you in writing about whether we have authority to investigate. If we do, we will investigate the complaint.      [facility] will notify you in writing of the investigation s outcome. You have a right to appeal the outcome if you disagree with the decision. To appeal, you must send a written request to have [facility] review the investigation outcome period. Be brief and state why you disagree with the decision. Include additional information you think is important.      If you file a complaint in this way, the people who work for the agency named  in the complaint cannot retaliate against you. This means they cannot punish you in any way for filing a complaint. Filing a complaint in this way does not stop you from seeking out other legal or administration actions.     Contact DHS directly to file a discrimination complaint:        Civil Rights Coordinator        [place] Department of Human Services        Equal Opportunity and Access Division        [address] [address]        [place] [address] [phone] (voice) or use your preferred relay service     [employer] Complaint Notice   You have the right to file a complaint with [employer] if you believe you have been discriminated against because of any of the following:       Medical condition    Health status    Receipt of health care services    Claims experience    Medical history    Genetic information    Disability (including mental or physical impairment)    Marital status    Age    Sex (including sex stereotypes and gender identity)    Sexual orientation    National origin    Race    Color    Restorationism    Creed    Public assistance status    Political beliefs    You can file a complaint and ask for help in filing a complaint in person or by mail, phone, fax, or email at:     [employer] Civil Rights Coordinator  [facility] [employer]  [address], [place] [address] [phone] (voice and fax) or TTY:175  Email: [email or web address]    American Indians can continue to use [facility] and  Health Services (IHS) clinics. We will not require prior approval or impose any conditions for you to get services at these clinics. For elders age 65 years and older this includes Elderly Waiver (EW) services accessed through the [place]. If a doctor or other provider in a [facility] or [facility] clinic refers you to a provider in our network, we will not require you to see your primary care provider prior to the referral.    For accessible formats of this publication or assistance with equal  or access to our services, visit [email or web address], or call [phone] (toll free) or use your preferred relay service.

## 2020-11-18 NOTE — PROGRESS NOTES
[facility] Care Coordination Contact    Received after visit chart from care coordinator.  Completed following tasks: Mailed copy of care plan to client and mailed  waiver form, POC sig sheet w/ESE Srivastava  Case Management Specialist  [facility]  [phone]

## 2020-11-27 ENCOUNTER — VIRTUAL VISIT (OUTPATIENT)
Dept: FAMILY MEDICINE | Facility: CLINIC | Age: 80
End: 2020-11-27
Payer: COMMERCIAL

## 2020-11-27 DIAGNOSIS — G89.29 UPPER BACK PAIN, CHRONIC: ICD-10-CM

## 2020-11-27 DIAGNOSIS — J44.9 COPD, MODERATE (H): ICD-10-CM

## 2020-11-27 DIAGNOSIS — M54.9 UPPER BACK PAIN, CHRONIC: ICD-10-CM

## 2020-11-27 DIAGNOSIS — J45.30 MILD PERSISTENT ASTHMA WITHOUT COMPLICATION: ICD-10-CM

## 2020-11-27 DIAGNOSIS — M54.50 CHRONIC MIDLINE LOW BACK PAIN WITHOUT SCIATICA: ICD-10-CM

## 2020-11-27 DIAGNOSIS — M54.2 CERVICALGIA: ICD-10-CM

## 2020-11-27 DIAGNOSIS — G89.29 CHRONIC MIDLINE LOW BACK PAIN WITHOUT SCIATICA: ICD-10-CM

## 2020-11-27 DIAGNOSIS — Z20.822 EXPOSURE TO COVID-19 VIRUS: Primary | ICD-10-CM

## 2020-11-27 PROCEDURE — 99214 OFFICE O/P EST MOD 30 MIN: CPT | Mod: GT | Performed by: INTERNAL MEDICINE

## 2020-11-27 RX ORDER — ALBUTEROL SULFATE 0.83 MG/ML
2.5 SOLUTION RESPIRATORY (INHALATION) EVERY 6 HOURS PRN
Qty: 30 VIAL | Refills: 11 | Status: SHIPPED | OUTPATIENT
Start: 2020-11-27

## 2020-11-27 RX ORDER — FLUTICASONE PROPIONATE AND SALMETEROL XINAFOATE 230; 21 UG/1; UG/1
2 AEROSOL, METERED RESPIRATORY (INHALATION) 2 TIMES DAILY
Qty: 3 INHALER | Refills: 3 | Status: SHIPPED | OUTPATIENT
Start: 2020-11-27

## 2020-11-27 RX ORDER — ALBUTEROL SULFATE 90 UG/1
2 AEROSOL, METERED RESPIRATORY (INHALATION) EVERY 4 HOURS PRN
Qty: 54 G | Refills: 3 | Status: SHIPPED | OUTPATIENT
Start: 2020-11-27

## 2020-11-27 NOTE — PROGRESS NOTES
"Wilma Lorenzana is a 80 year old female who is being evaluated via a billable video visit.      The patient has been notified of following:     \"This video visit will be conducted via a call between you and your physician/provider. We have found that certain health care needs can be provided without the need for an in-person physical exam.  This service lets us provide the care you need with a video conversation.  If a prescription is necessary we can send it directly to your pharmacy.  If lab work is needed we can place an order for that and you can then stop by our lab to have the test done at a later time.    Video visits are billed at different rates depending on your insurance coverage.  Please reach out to your insurance provider with any questions.    If during the course of the call the physician/provider feels a video visit is not appropriate, you will not be charged for this service.\"    Patient has given verbal consent for Video visit? Yes  How would you like to obtain your AVS? Mail a copy  If you are dropped from the video visit, the video invite should be resent to: Text to cell phone: [phone]  Will anyone else be joining your video visit? No    Subjective     Wilma Lorenzana is a 80 year old female who presents today via video visit for the following health issues:    HPI     Hypertension Follow-up      Do you check your blood pressure regularly outside of the clinic? Yes     Are you following a low salt diet? Yes    Are your blood pressures ever more than 140 on the top number (systolic) OR more   than 90 on the bottom number (diastolic), for example 140/90? No           Video Start Time: 11:10       Exposure to COVID-19 virus  Mild persistent asthma without complication  COPD, moderate (H)  Cervicalgia  Upper back pain, chronic  Chronic midline low back pain without sciatica   I spoke with Wilma's daughter Fettlework today with regards to Wilma's symptoms of slight muscle aches and " sore throat as well as slight cough.  She does have known asthma and COPD.  Wilma does have a sick contact and that she has been around her daughter who was tested positive last Thursday.  Her daughter is now asymptomatic and has been for the past 3 days.  She is still in quarantine and not seeing her mother currently.    Review of Systems   Constitutional, HEENT, cardiovascular, pulmonary, GI, , musculoskeletal, neuro, skin, endocrine and psych systems are negative, except as otherwise noted.      Objective           Vitals:  No vitals were obtained today due to virtual visit.    Physical Exam     deferred      No results found for this or any previous visit (from the past 24 hour(s)).        Assessment & Plan     Exposure to COVID-19 virus  I did order a Covid PCR test for.  We will try to coordinate getting this done in the next few days  - Symptomatic COVID-19 Virus (Coronavirus) by PCR; Future    Mild persistent asthma without complication  Refill albuterol inhaler to have available  - albuterol (VENTOLIN HFA) 108 (90 Base) MCG/ACT inhaler; Inhale 2 puffs into the lungs every 4 hours as needed for shortness of breath / dyspnea or wheezing  - albuterol (PROVENTIL) (2.5 MG/3ML) 0.083% neb solution; Take 1 vial (2.5 mg) by nebulization every 6 hours as needed for shortness of breath / dyspnea or wheezing    COPD, moderate (H)  Refill Advair  - fluticasone-salmeterol (ADVAIR-HFA) 230-21 MCG/ACT inhaler; Inhale 2 puffs into the lungs 2 times daily    Cervicalgia  We also discussed the need for a new TENS unit.  This was ordered today I will route this to care coordination  - Tens Unit/Supplies Order for DME - ONLY FOR DME    Upper back pain, chronic    - Tens Unit/Supplies Order for DME - ONLY FOR DME    Chronic midline low back pain without sciatica    - Tens Unit/Supplies Order for DME - ONLY FOR DME            No follow-ups on file.    Lavelle Aj MD, MD  [facility]      15  minutes spent with patient.  Over 50% of time counseling over phone

## 2020-11-28 ENCOUNTER — AMBULATORY - HEALTHEAST (OUTPATIENT)
Dept: FAMILY MEDICINE | Facility: CLINIC | Age: 80
End: 2020-11-28

## 2020-11-28 DIAGNOSIS — Z20.822 EXPOSURE TO COVID-19 VIRUS: ICD-10-CM

## 2020-11-30 ENCOUNTER — COMMUNICATION - HEALTHEAST (OUTPATIENT)
Dept: SCHEDULING | Facility: CLINIC | Age: 80
End: 2020-11-30

## 2020-12-01 NOTE — TELEPHONE ENCOUNTER
Ten unit was ordered during 11/27 office visit - see orders tab in Chart review    Lavelle Aj MD, MD

## 2020-12-02 ENCOUNTER — PATIENT OUTREACH (OUTPATIENT)
Dept: GERIATRIC MEDICINE | Facility: CLINIC | Age: 80
End: 2020-12-02

## 2020-12-02 NOTE — PROGRESS NOTES
[facility] Care Coordination Contact    Chart reviewed. TENs unit order has been faxed to [facility] on 11/27/20. CC called and left a voice mail message with the above provider and to check on status at [phone] and offer assistance.      Dileep Jorge RN Care Coordinator  [facility]  Office: [phone]  Fax: [phone]  [email or web address]

## 2020-12-02 NOTE — PROGRESS NOTES
CC received a phone call from Macie at [facility] that they will need documentation from PT on the use of the TENs units and the benefit/effectiveness of the TENs unit in order for insurance to cover. They will need this information to fax. She has discussed this with the daughter who stated she will schedule the PT appt.       Dileep Jorge RN Care Coordinator  [facility]  Office: [phone]  Fax: [phone]  [email or web address]

## 2021-01-15 DIAGNOSIS — M51.379 DEGENERATION OF LUMBAR OR LUMBOSACRAL INTERVERTEBRAL DISC: ICD-10-CM

## 2021-01-15 DIAGNOSIS — M54.2 CERVICALGIA: ICD-10-CM

## 2021-01-16 RX ORDER — TRAMADOL HYDROCHLORIDE 50 MG/1
TABLET ORAL
Qty: 90 TABLET | Refills: 0 | Status: SHIPPED | OUTPATIENT
Start: 2021-01-16 | End: 2021-03-12

## 2021-03-08 DIAGNOSIS — K21.9 GASTROESOPHAGEAL REFLUX DISEASE WITHOUT ESOPHAGITIS: ICD-10-CM

## 2021-03-10 NOTE — TELEPHONE ENCOUNTER
Routing refill request to provider for review/approval because:  Failed protocol      PPI Protocol [id number]/08/2021 01:40 PM   No diagnosis of osteoporosis on record

## 2021-03-11 DIAGNOSIS — M51.379 DEGENERATION OF LUMBAR OR LUMBOSACRAL INTERVERTEBRAL DISC: ICD-10-CM

## 2021-03-11 DIAGNOSIS — M54.2 CERVICALGIA: ICD-10-CM

## 2021-03-12 RX ORDER — TRAMADOL HYDROCHLORIDE 50 MG/1
TABLET ORAL
Qty: 90 TABLET | Refills: 0 | Status: SHIPPED | OUTPATIENT
Start: 2021-03-12 | End: 2021-06-23

## 2021-05-12 ENCOUNTER — PATIENT OUTREACH (OUTPATIENT)
Dept: GERIATRIC MEDICINE | Facility: CLINIC | Age: 81
End: 2021-05-12

## 2021-06-23 DIAGNOSIS — M51.379 DEGENERATION OF LUMBAR OR LUMBOSACRAL INTERVERTEBRAL DISC: ICD-10-CM

## 2021-06-23 DIAGNOSIS — M54.2 CERVICALGIA: ICD-10-CM

## 2021-06-23 RX ORDER — TRAMADOL HYDROCHLORIDE 50 MG/1
TABLET ORAL
Qty: 90 TABLET | Refills: 0 | Status: SHIPPED | OUTPATIENT
Start: 2021-06-23

## 2021-06-23 NOTE — TELEPHONE ENCOUNTER
Due for fasting annual exam, last fasting labs 1/21/2020      Pending Prescriptions:                       Disp   Refills    traMADol (ULTRAM) 50 MG tablet [Pharmacy *90 tab*             Sig: TAKE 1 TABLET BY MOUTH EVERY 6 HOURS AS NEEDED           FOR SEVERE PAIN          Last Written Prescription Date:  3-  Last Fill Quantity: 90,   # refills: 0  Last Office Visit: 11- [place]  Future Office visit:   None    Routing refill request to provider for review/approval because:  Drug not on the [facility], P or [facility] refill protocol or controlled substance    RT [place] (R)

## 2021-08-10 ENCOUNTER — PATIENT OUTREACH (OUTPATIENT)
Dept: GERIATRIC MEDICINE | Facility: CLINIC | Age: 81
End: 2021-08-10

## 2021-08-10 NOTE — PROGRESS NOTES
[facility] Care Coordination Contact    CC received notification from previous CC to call daughter Fetlework as she has questions on coverage.    CC called to daughter at [phone] and left a voice mail message today.      Dileep Jorge RN Care Coordinator  [facility] Care Coordination  Office: [phone]  Fax: [phone]  [email or web address]

## 2021-10-07 ENCOUNTER — PATIENT OUTREACH (OUTPATIENT)
Dept: GERIATRIC MEDICINE | Facility: CLINIC | Age: 81
End: 2021-10-07

## 2021-10-07 NOTE — PROGRESS NOTES
[facility] Care Coordination Contact      [facility] Refusal Telephone Assessment    Member refused home visit HRA on 10/7/21 (reason: Declined stating she is doing well).    ER visits: No  Hospitalizations: No  Health concerns: Denies any health concern.   Falls/Injuries: No  ADL/IADL Dependencies: Ambulation with walker and has daughter for IADL/transportation assistance.        Member currently receiving the following home care services: n/a     Member currently receiving the following community resources: n/a  Informal support(s): Daughter    Advanced Care Planning discussion, complete code section.    [facility] sponsored benefits: Shared information re: Silver Sneakers/gym memberships, ASA, Calcium +D.    Follow-Up Plan: Member informed of future contact, plan to f/u with member with a 6 month telephone assessment and offer a home visit.  Contact information shared with member and family, encouraged member to call with any questions or concerns at any time.    Daughter shared that member has traveled to [place] to visit her other daughter last month. Discussed regarding following up with PCP at least once a year for health check and mediation refill. Last visit with PCP was on 11/27/2020. Per daughter, she has received the flu vaccine this year and the COVID vaccine in [place].       Dileep oJrge RN BSN [facility] Care Coordination  Office: [phone]  Fax: [phone]  [email or web address]

## 2021-10-07 NOTE — LETTER
October 8, 2021    Important Medica Information    WILMA SUMAN (JONATAN) [facility]  [address]  [place] [address]  I'm Here to Help  Dear Wilma,  My name is Dileep Jorge RN, and I am your [employer] Care Coordinator. You indicated you are not interested in meeting with me to complete a health risk assessment.   As a Care Coordinator, I am here to help. My role is to make sure that your health plan is working for you. I am available to:     Review your health care needs with you over the phone or in-person     Provide support for and information about covered services or supplies to help keep you safe and healthy in your home    Answer questions about your insurance     Help you find a provider, such as a doctor or dentist, to meet your unique needs    Enclosed is a Self-Risk Assessment form that we ask you to fill out so we can get to know you a little bit better. Once completed, please send back in the self-addressed stamped envelope.     Questions?  Call me at [phone] Monday-Friday between 8am and 5pm. TTY: 711. If you d like, a friend or family member may call for you.   You may also call [employer] Customer Service at [phone] or [phone] (toll free) from 8 a.m. - 8 p.m. Central, seven days a week. Access to representatives may be limited at times. TTY: 711.  Sincerely,    Dileep Jorge RN  [phone]  [email or web address]      cc: member records                                                                                          CB5 ([facility]) (5-2020)    Civil Rights Notice  Discrimination is against the law. Med[employer] does not discriminate on the basis of any of the following:    Race    Color    National Origin    Creed    Methodist    Age    Public Assistance Status    Receipt of Health Care Services    Disability (including physical or mental impairment)    Sex (including sex stereotypes and gender identity)    Marital Status    Political Beliefs    Medical Condition    Genetic Information    Sexual  Orientation    Claims Experience    Medical History    Health Status    Auxiliary Aids and Services:  Medica provides auxiliary aids and services, like qualified interpreters or information in accessible formats, free of charge and in a timely manner, to ensure an equal opportunity to participate in our health care programs. Contact Medica at [employer]/contact medicaid or call [phone] (toll free); TTY:716 or at [employer]/contactArtemis Health [email or web address].caid.    Language Assistance Services:  [employer] provides translated documents and spoken language interpreting, free of charge and in a timely manner, when language assistance services are necessary to ensure limited English speakers have meaningful access to our information and services. Contact [employer] at [phone] (toll free); TTY: 497 or [employer]/contactmedicaid.     Civil Rights Complaints  You have the right to file a discrimination complaint if you believe you were treated in a discriminatory way by Medic. You may contact any of the following four agencies directly to file a discrimination complaint.          U.S. Department of Health and Human Services  Office for Civil Rights (OCR)  You have the right to file a complaint with the OCR, a federal agency, if you believe you have been discriminated against because of any of the following:    Race    Disability    Color    Sex    National Origin    Age    Scientology (in some cases)    Contact the OCR directly to file a complaint:         Director         U.S. Department of Health and Human Services  Office for Civil Rights         [address] [place] [address]         Customer Response Center: Toll-free: [phone]          TDD: [phone]         Email: [email or web address]    [place] Department of Human Rights (MDHR)  In [place], you have the right to file a complaint with the [facility] if you believe you have been discriminated against because of any of the  following:      Race    Color    National Origin    Orthodoxy    Creed    Sex    Sexual Orientation    Marital Status    Public Assistance Status    Disability    Contact the MD directly to file a complaint:         [facility] of Human Rights         [address] [address]         [phone] (voice)          [phone] (toll free)         711 or [phone] ([place] Relay)         [phone] (Fax)         [email or web address] (Email)     [place] Department of Human Services (DHS)  You have the right to file a complaint with [facility] if you believe you have been discriminated against in our health care programs because of any of the following:    Race    Color    National Origin    Creed    Orthodoxy    Age    Public Assistance Status    Receipt of Health Care Services    Disability (including physical or mental impairment)    Sex (including sex stereotypes and gender identity)    Marital Status    Political Beliefs    Medical Condition    Genetic Information    Sexual Orientation    Claims Experience    Medical History    Health Status    Complaints must be in writing and filed within 180 days of the date you discovered the alleged discrimination. The complaint must contain your name and address and describe the discrimination you are complaining about. After we get your complaint, we will review it and notify you in writing about whether we have authority to investigate. If we do, we will investigate the complaint.      [facility] will notify you in writing of the investigation s outcome. You have a right to appeal the outcome if you disagree with the decision. To appeal, you must send a written request to have [facility] review the investigation outcome. Be brief and state why you disagree with the decision. Include additional information you think is important.      If you file a complaint in this way, the people who work for the agency named in the complaint cannot  retaliate against you. This means they cannot punish you in any way for filing a complaint. Filing a complaint in this way does not stop you from seeking out other legal or administration actions.     Contact [facility] directly to file a discrimination complaint:        Civil Rights Coordinator        [facility] of Human Services        Equal Opportunity and Access Division        [address] [address]        [place] [address] [phone] (voice) or use your preferred relay service     Medica Complaint Notice   You have the right to file a complaint with [employer] if you believe you have been discriminated against because of any of the following:       Medical condition    Health status    Receipt of health care services    Claims experience    Medical history    Genetic information    Disability (including mental or physical impairment)    Marital status    Age    Sex (including sex stereotypes and gender identity)    Sexual orientation    National origin    Race    Color    Church    Creed    Public assistance status    Political beliefs    You can file a complaint and ask for help in filing a complaint in person or by mail, phone, fax, or email at:     Medica Civil Rights Coordinator  [facility]  [address], [place] [address] [phone] (voice and fax) or TTY:454  Email: [email or web address]    American Indians can begin or continue to use [place] and  Health Services (IHS) clinics. We will not require prior approval or impose any conditions for you to get services at these clinics. For elders age 65 years and older this includes Elderly Waiver (EW) services accessed through the [place]. If a doctor or other provider in a [place] or IHS clinic refers you to a provider in our network, we will not require you to see your primary care provider prior to the referral.

## 2021-10-08 ENCOUNTER — PATIENT OUTREACH (OUTPATIENT)
Dept: GERIATRIC MEDICINE | Facility: CLINIC | Age: 81
End: 2021-10-08

## 2021-10-08 NOTE — PROGRESS NOTES
[facility] Care Coordination Contact    CC called to member's daughter to verify on member's legal name. She stated her last name is Harriett and she does not know why it was changed. CC recommended when she goes to the clinic next time, to have to her name changed back to her name on her insurance card. CC discussed regarding flu vaccine and covid vaccine record. Daughter shared that member received it in [facility] while in [place] in August. CC recommended for her to get a copy of the record to bring to the clinic so it can be recorded in her medical record and if she received the Pfizer vaccine, she will be eligible for the booster vaccine 6 months after her second dose. She voiced understanding.         Dileep Jorge RN BSN [facility] Care Coordination  Office: [phone]  Fax: [phone]  [email or web address]

## 2021-10-08 NOTE — PROGRESS NOTES
"[facility] Care Coordination Contact    Per CC, mailed client a \"Refusal of Home Visit\" letter.    Mailed member [employer] Self Report Health Assessment with self addressed return envelope.     Cally Srivastava  Case Management Specialist  [facility]  [phone]    "

## 2021-12-29 DIAGNOSIS — M51.379 DEGENERATION OF LUMBAR OR LUMBOSACRAL INTERVERTEBRAL DISC: ICD-10-CM

## 2021-12-29 DIAGNOSIS — M54.2 CERVICALGIA: ICD-10-CM

## 2021-12-31 RX ORDER — TRAMADOL HYDROCHLORIDE 50 MG/1
TABLET ORAL
Qty: 90 TABLET | OUTPATIENT
Start: 2021-12-31

## 2022-02-17 PROBLEM — G89.4 CHRONIC PAIN SYNDROME: Status: RESOLVED | Noted: 2018-03-16 | Resolved: 2020-06-12

## 2022-02-17 PROBLEM — F11.90 CHRONIC, CONTINUOUS USE OF OPIOIDS: Status: ACTIVE | Noted: 2020-06-12

## 2022-04-07 ENCOUNTER — PATIENT OUTREACH (OUTPATIENT)
Dept: GERIATRIC MEDICINE | Facility: CLINIC | Age: 82
End: 2022-04-07
Payer: COMMERCIAL

## 2022-04-07 NOTE — PROGRESS NOTES
[facility] Care Coordination Contact    Called adult daughter Sergio to complete six month assessment and left a message requesting a return call.    DUONG Vergara, RN, [facility] Care Coordination  Office: [phone]  Fax: [phone]  [email or web address]

## 2022-04-22 ENCOUNTER — PATIENT OUTREACH (OUTPATIENT)
Dept: GERIATRIC MEDICINE | Facility: CLINIC | Age: 82
End: 2022-04-22
Payer: COMMERCIAL

## 2022-04-22 NOTE — PROGRESS NOTES
[facility] Care Coordination Contact      [facility] Six-Month Telephone Assessment    6 month telephone assessment completed on 4/22/22.    ER visits: No  Hospitalizations: No  TCU stays: No  Significant health status changes: Daughter reported member is doing well. She is currently in [place] visiting her family and has been there for a couple of months now. She is seeing a provider in [place] and getting her medications refilled.   Falls/Injuries: No  ADL/IADL changes: No  Changes in services: No    Caregiver Assessment follow up:  n/a    Goals: See POC in chart for goal progress documentation.     Will see member in 6 months for an annual health risk assessment.   Encouraged member to call CC with any questions or concerns in the meantime.       DUONG Vergara, RN, [facility] Care Coordination  Office: [phone]  Fax: [phone]  [email or web address]

## 2022-08-18 ENCOUNTER — PATIENT OUTREACH (OUTPATIENT)
Dept: GERIATRIC MEDICINE | Facility: CLINIC | Age: 82
End: 2022-08-18

## 2022-08-18 NOTE — PROGRESS NOTES
No call to member. [facility] Regulatory Update.      DUONG Vergara, RN, [facility] Care Coordination  Office: [phone]  Fax: [phone]  [email or web address]

## 2022-09-01 ENCOUNTER — PATIENT OUTREACH (OUTPATIENT)
Dept: GERIATRIC MEDICINE | Facility: CLINIC | Age: 82
End: 2022-09-01

## 2022-09-01 NOTE — PROGRESS NOTES
Opened encounter to close episode per Regulatory for reassessment.      DUONG Vergara, RN, [facility] Care Coordination  Office: [phone]  Fax: [phone]  [email or web address]

## 2022-09-01 NOTE — PROGRESS NOTES
Opened encounter to open episode per Regulatory for reassessment.       DUONG Vergara, RN, [facility] Coordination  Office: [phone]  Fax: [phone]  [email or web address]        
124

## 2022-09-06 ENCOUNTER — PATIENT OUTREACH (OUTPATIENT)
Dept: GERIATRIC MEDICINE | Facility: CLINIC | Age: 82
End: 2022-09-06

## 2022-09-06 NOTE — PROGRESS NOTES
[facility] Care Coordination Contact    Called adult daughter Fetle[place] to schedule annual HRA home visit. Left a message requesting a return call to schedule HRA.     DUONG Vergara, RN, [facility] Care Coordination  Office: [phone]  Fax: [phone]  [email or web address]

## 2022-09-14 ENCOUNTER — PATIENT OUTREACH (OUTPATIENT)
Dept: GERIATRIC MEDICINE | Facility: CLINIC | Age: 82
End: 2022-09-14

## 2022-09-14 NOTE — PROGRESS NOTES
[facility] Care Coordination Contact    CC spoke with daughter that member has returned to [place] 3 days ago. She has no date for return to [place] at this time. Daughter stated she did not reapply for her MA in August. CC will send [facility] [phone] to [facility] [place] for notification.      Member is officially unable to contact effective today 9/14/22.  Completed MMIS entry.  Completed health plan required [facility] POC.    Follow-up Plan: CC will attempt to reach member in six months.    This CC note routed to PCP.      DUONG Vergara, RN, [facility] Care Coordination  Office: [phone]  Fax: [phone]  [email or web address]

## 2022-09-19 ENCOUNTER — PATIENT OUTREACH (OUTPATIENT)
Dept: GERIATRIC MEDICINE | Facility: CLINIC | Age: 82
End: 2022-09-19

## 2022-09-19 NOTE — PROGRESS NOTES
"[facility] Care Coordination Contact    Per CC, mailed client an \"Unable to Contact\" letter.  Mailed member Med[employer] Self Report Health Risk Assessment with self-addressed return envelope & supporting documents as required.     Cally Srivastava  Case Management Specialist  [facility]  [phone]    "

## 2023-02-27 NOTE — PROGRESS NOTES
VM from daughter calling to confirm Friday's appointment at 11AM. Return call to daughter and mailbox was full.     Jodi Ingram RN  [facility]  [phone]  Fax: [phone]   Hx ETOH abuse, sober 2.5 yrs

## 2023-03-10 NOTE — TELEPHONE ENCOUNTER
[facility] [phone]   [email or web address]    Referral was placed   Yes

## 2023-03-22 NOTE — PLAN OF CARE
Cognitive Concerns/ Orientation : A&Ox4, Neuros intact, Amheric speaking (no English)- daughter helping with interpretations, Jabber used when needed, pleasant and cooperative.  BEHAVIOR & AGGRESSION TOOL COLOR: Green  CIWA SCORE: NA  ABNL VS/O2: VSS on RA  MOBILITY: SBA +GB, steady on feet with minimal wobbling.   PAIN MANAGMENT: Denies  DIET: Reg diet, fair/good appetite and oral intake.   BOWEL/BLADDER: Up to BR with SBA assist.   ABNL LAB/BG: No new labs, WDL prev. WBC 2.6, chronically low.   DRAIN/DEVICES: PIV SL with some irritation.   TELEMETRY RHYTHM: NSR with first degree AVB.   SKIN: Intact  TESTS/PROCEDURES: Vestibular PT consulted to evaluate pt before discharge.   D/C DAY/GOALS/PLACE: Possible tomorrow after PT assessment. Daughter available for transportation and to assist with discharge tomorrow.   OTHER IMPORTANT INFO: Denies dizziness. Cardiology signed off. Q4 Neuros intact. Using jabber. Daughter available by phone anytime and lives close by- plans to arrive early morning and help interpret. Bed alarm on for safety. Pt calls as needed. Meclizine PRN ordered for dizziness.       
DATE & TIME: 08/25/2020 3841-6441               Cognitive Concerns/ Orientation : Ax4; Neuros intact. Except right eye cloudy and slow pupil.  Amheric speaking (no English). Daughter helps with interpreting.  BEHAVIOR & AGGRESSION TOOL COLOR: green  CIWA SCORE: n/a     ABNL VS/O2: VSS, room air  MOBILITY: SBA, gait belt  PAIN MANAGMENT: heat pack to arm  DIET: Regular, vegetarian  BOWEL/BLADDER: Continent, no BM this shift  ABNL LAB/BG: WBC 2.6  DRAIN/DEVICES:   TELEMETRY RHYTHM: NSR with First degree AV block  SKIN: Intact  TESTS/PROCEDURES: Vestibular PT for vertigo consult was done this morning, she had a run of tachycardia up to 170 during treatment. Given dose of metoprolol.   Stat EKG and discharge event monitor placed prior to discharge.  D/C DAY/GOALS/PLACE: Discharge to private transportation home with daughter.  All personal belongings sent with patient.  OTHER IMPORTANT INFO: Pt c/o mild intermittent dizziness at rest worsens when up.  Declined meclizine.  MD/RN ROUNDING SIGNED OFF D/E SHIFT: n/a  COMMIT TO SIT DONE AND SIGNED OFF YES  OBSERVATION GOALS:   -diagnostic tests and consults completed and resulted--MET   -vital signs normal or at patient baseline--MET   -tolerating oral intake to maintain hydration--MET         
DATE & TIME: 8/23/20 3-11pm shift  Cognitive Concerns/ Orientation : A&Ox4, Amheric speaking (no English)- pleasant and cooperative  BEHAVIOR & AGGRESSION TOOL COLOR: Green  CIWA SCORE: NA  ABNL VS/O2: VSS on RA- dizziness when up to bathroom both times tonight- once associated with run of SVT which MD is aware of.  MOBILITY: SBA- GB  PAIN MANAGMENT: Denies  DIET: Reg  BOWEL/BLADDER: Up to BR  ABNL LAB/BG: WBC 3.0 Hgb 11.5  DRAIN/DEVICES: PIV SL  TELEMETRY RHYTHM: NSR with first degree AVB.   SKIN: Intact  TESTS/PROCEDURES: Echo completed. Cardiology consulted but no note placed.  D/C DAY/GOALS/PLACE: Pending  OTHER IMPORTANT INFO: Cardiology consulted. q4 Neuros intact. Using jabber. Trop q4hrs- stable. Daughter available by phone anytime and lives close by- plans to arrive early morning and help interpret.  
DATE & TIME: 8/23/20 4540-1394   Cognitive Concerns/ Orientation : A&Ox4, Amheric speaking (no English)  BEHAVIOR & AGGRESSION TOOL COLOR: Green  CIWA SCORE: NA  ABNL VS/O2: VSS on RA  MOBILITY: SBA  PAIN MANAGMENT: Denies  DIET: Reg  BOWEL/BLADDER: Up to BR  ABNL LAB/BG: WBC 3.0 Hgb 11.5  DRAIN/DEVICES: PIV SL  TELEMETRY RHYTHM: NSR  SKIN: Intact  TESTS/PROCEDURES: Echo  D/C DAY/GOALS/PLACE: Pending  OTHER IMPORTANT INFO: Cardiology consulted. q4 Neuros intact. Trop q4hrs. Daughter will interpret, declined services.        
DATE & TIME: 8/24/20 1063-2566  Cognitive Concerns/ Orientation : A&Ox4, Amheric speaking (no English)- pleasant and cooperative  BEHAVIOR & AGGRESSION TOOL COLOR: Green  CIWA SCORE: NA  ABNL VS/O2: VSS on RA  MOBILITY: SBA +GB  PAIN MANAGMENT: Denies  DIET: Reg  BOWEL/BLADDER: Up to BR  ABNL LAB/BG: None new  DRAIN/DEVICES: PIV SL  TELEMETRY RHYTHM: NSR with first degree AVB.   SKIN: Intact  TESTS/PROCEDURES: None  D/C DAY/GOALS/PLACE: Discharge pending  OTHER IMPORTANT INFO: Denies dizziness. Cardiology signed off. Q4 Neuros intact. Using jabber. Daughter available by phone anytime and lives close by- plans to arrive early morning and help interpret.  Observation goals  PRIOR TO DISCHARGE      Comments: -diagnostic tests and consults completed and resulted- Not met   -vital signs normal or at patient baseline- Met   -tolerating oral intake to maintain hydration- Met        
DATE & TIME: 8/24/2020 9986-0643   Cognitive Concerns/ Orientation : A&OX4   BEHAVIOR & AGGRESSION TOOL COLOR: green  CIWA SCORE: NA   ABNL VS/O2: VSS  MOBILITY: Up with SBA.  PAIN MANAGMENT: C/O headache this AM.  Tylenol given with relief  DIET: Regular  BOWEL/BLADDER: Continent  ABNL LAB/BG: WDL  DRAIN/DEVICES: IV infusing  TELEMETRY RHYTHM: SR with 1st degree AV block  SKIN: Intact  TESTS/PROCEDURES: NA  D/C DAY/GOALS/PLACE: Home pending progress  OTHER IMPORTANT INFO: Pt speaks Arabic.  Valerianober used when daughter was not here to interpret  MD/RN ROUNDING SIGNED OFF D/E SHIFT: Yes  COMMIT TO SIT DONE AND SIGNED OFF Yes      
Discharge Planner PT   Patient plan for discharge: Home with daughter  Current status: Patient seen for initial eval and treatment provided.   Patient lives with her daughter in a town home and is independent with ADL and IADL's except driving. Patient uses a cane when outside the town home.     Currently patient denies symptoms of vertigo in supine. +Clare hallpike test to the left. Therapist performed Epley maneuver with no nystagmus noted with retesting. Patient is safe and independent with transfers. She feels weak with amb and did describe one time that her heart was racing after amb quickly. Nurse did report HR up to 170 during session at one time. Patient able to go up and down a flight of steps with supervision with use of rail.   Daughter present and assisted with interpreting once she got there. Explained results of eval to daughter and recommendation for vestibular OPPT if symptoms persist. Did inform daughter that sometimes one treatment can be enough but other times symptoms persist requiring further treatment as OP. Daughter expressed understanding and comfortable with patient going home.   Barriers to return to prior living situation: none  Recommendations for discharge: Home with daughter and outpatient vestibular PT(therapist placed order in computer)  Rationale for recommendations: Patient with left BPPV. Would benefit from additional treatment if symptoms persist. She is safe with mobility and daughter will be with her at home.        Entered by: Wendy Mcdaniels 08/25/2020 10:03 AM      
1 or 2

## 2023-04-07 ENCOUNTER — PATIENT OUTREACH (OUTPATIENT)
Dept: GERIATRIC MEDICINE | Facility: CLINIC | Age: 83
End: 2023-04-07
Payer: COMMERCIAL

## 2023-04-07 NOTE — PROGRESS NOTES
"[facility] Care Coordination Contact    Per CC, mailed client an \"Unable to Contact\" letter.  Mailed member [employer] Self Report Health Risk Assessment with self-addressed return envelope & supporting documents as required.     [place]  Care Management Specialist  [facility]  [phone]    "

## 2023-04-07 NOTE — LETTER
April 7, 2023    Important Medica Information    WILMA SUMAN CHEUNG  [address] [place] [place] [address]  I've Tried to Contact You  Dear Wilma,  My name is  Dileep Jorge RN, and I am your Care Coordinator. I have been trying to contact you, but have not been able to reach you.  As a Care Coordinator, I am here to help. My role is to make sure that your health plan is working for you. I am available to:  Review your health care needs with you over the phone or in-person   Provide support for and information about covered services or supplies to help keep you safe and healthy in your home  Answer questions about your insurance   Help you find a provider, such as a doctor or dentist, to meet your unique needs  I can also help you schedule a free physical at your clinic. To schedule an appointment, please call me at [phone] Monday-Friday between 8am-5pm TTY: 711.  I have included a copy of a Health Risk Assessment. Please fill it out and return it in the included envelope I have also included a document that provides you with more information about my role as your Care Coordinator and how I can help with your medical, social and everyday needs.   Questions?  Please call me at the phone number listed above. If you d like, a friend or family member may call for you.  For general questions, call [employer] Member Services at [phone] (toll free) from 8 a.m. - 8 p.m. Central, seven days a week. Access to representatives may be limited at times. TTY: 711.  Sincerely,    Dileep Jorge RN  [phone]  [email or web address]      cc: member records

## 2023-04-13 ENCOUNTER — PATIENT OUTREACH (OUTPATIENT)
Dept: GERIATRIC MEDICINE | Facility: CLINIC | Age: 83
End: 2023-04-13
Payer: COMMERCIAL

## 2023-04-13 NOTE — PROGRESS NOTES
[facility] Care Coordination Contact    CC called and left voice mail message with daughter today.    Completed 4 attempts to reach client with no response for 6 months telephone assessment.  Updated [facility] POC.     Follow-up Plan: CC will attempt to reach member in six months.    DUONG Vergara, RN, [facility] Care Coordination  Office: [phone]  Fax: [phone]  [email or web address]

## 2023-08-17 ENCOUNTER — PATIENT OUTREACH (OUTPATIENT)
Dept: GERIATRIC MEDICINE | Facility: CLINIC | Age: 83
End: 2023-08-17
Payer: COMMERCIAL

## 2023-08-17 NOTE — PROGRESS NOTES
[facility] Care Coordination Contact    CC spoken with daughter Sergio that member is still out of the country in [place] with no plan to return to [place]. Member is unable to contact effective today.  Completed MMIS entry.  Completed health plan required [facility] POC.    Follow-up Plan: CC will attempt to reach member in six months.    This CC note routed to PCP, Lavelle Aj.      DUONG Vergara, RN, [facility] Care Coordination  Office: [phone]  Fax: [phone]  [email or web address]

## 2024-02-22 ENCOUNTER — PATIENT OUTREACH (OUTPATIENT)
Dept: GERIATRIC MEDICINE | Facility: CLINIC | Age: 84
End: 2024-02-22
Payer: COMMERCIAL

## 2024-02-22 NOTE — PROGRESS NOTES
[facility] Care Coordination Contact    Member has been out of country sine 9[place]11/2022, confirmed with family on 8/17/23.  Member is unable to contact for 6 months outreach.     Follow-up Plan: CC will attempt to reach member in six months.    This CC note routed to PCP, Lavelle Aj.    DUONG Vergara, RN, [facility] Care Coordination  Office: [phone]  Fax: [phone]  [email or web address]

## 2024-06-17 PROBLEM — Z76.89 HEALTH CARE HOME: Status: RESOLVED | Noted: 2017-03-01 | Resolved: 2024-06-17

## 2024-07-31 ENCOUNTER — PATIENT OUTREACH (OUTPATIENT)
Dept: GERIATRIC MEDICINE | Facility: CLINIC | Age: 84
End: 2024-07-31
Payer: COMMERCIAL

## 2024-07-31 NOTE — PROGRESS NOTES
"[facility] Care Coordination Contact    Per CC, mailed client an \"Unable to Contact\" letter.  Mailed member [employer] Member Engagement Questionnaire with self-addressed return envelope & supporting documents as required.     Remedios Olsen  Care Management Specialist  [facility]  [phone]      "

## 2024-07-31 NOTE — LETTER
July 31, 2024    Important [employer] Information    WILMA SUMAN CHEUNG  [address] [place] [address]  I've Tried to Contact You  Dear Wilma,  My name is  Dileep Jorge RN, and I am your Care Coordinator. I have been trying to contact you, but have not been able to reach you.  As a Care Coordinator, I am here to help. My role is to make sure that your health plan is working for you. I am available to:  Review your health care needs with you over the phone or in-person   Provide support for and information about covered services or supplies to help keep you safe and healthy in your home  Answer questions about your insurance   Help you find a provider, such as a doctor or dentist, to meet your unique needs  I can also help you schedule a free physical at your clinic. To schedule an appointment, please call me at [phone] Monday-Friday between 8am-5pm TTY: 711.  I have included a copy of a Member Engagement Questionnaire. Please fill it out and return it in the included envelope I have also included a document that provides you with more information about my role as your Care Coordinator and how I can help with your medical, social and everyday needs.     Questions?  Please call me at the phone number listed above. If you d like, a friend or family member may call for you.  For general questions, call:   [employer] Member Services at [phone] between the hours of 8 a.m. to 9 p.m. Central, seven days a week. TTY: 711.  Please note that access to a representative may be limited during certain times of the year.    Sincerely,    Dileep Jorge RN  [phone]  [email or web address]      cc: member records                                                                      [employer]  and [employer] Access[employer]ility Solution  Enhanced are HMO D-SNPs that contract with both Medicare and the [place] Medical Assistance (Medicaid) program to provide benefits of both programs to enrollees. Enrollment in [employer] DUAL  Solution and [employer] Access[employer]ility Solution Enhanced depends on contract renewal.      2023 [employer].

## 2024-08-06 ENCOUNTER — PATIENT OUTREACH (OUTPATIENT)
Dept: GERIATRIC MEDICINE | Facility: CLINIC | Age: 84
End: 2024-08-06
Payer: COMMERCIAL

## 2024-08-06 NOTE — PROGRESS NOTES
[facility] Care Coordination Contact    Completed 4 attempts to reach client with no response.  Member is officially unable to contact effective today.  Completed MMIS entry.  Completed health plan required [facility] POC.    Follow-up Plan: CC will attempt to reach member in six months.    This CC note routed to PCP, Lavelle Aj.    DUONG Vergara, RN, [facility] Care Coordination  Office: [phone]  Fax: [phone]  [email or web address]

## 2024-09-16 ENCOUNTER — PATIENT OUTREACH (OUTPATIENT)
Dept: GERIATRIC MEDICINE | Facility: CLINIC | Age: 84
End: 2024-09-16
Payer: COMMERCIAL

## 2024-09-16 NOTE — PROGRESS NOTES
[facility] Care Coordination Contact    Called member's daughter and left a voice mail to remind member to call for this CHW if member requires assistance with their medical assistance renewal. In the message notified member that the paperwork is due by (10/01/24).  Provided this CHW contact information.     JOHANNA Castellano  [facility]  [phone]

## 2025-02-11 ENCOUNTER — PATIENT OUTREACH (OUTPATIENT)
Dept: GERIATRIC MEDICINE | Facility: CLINIC | Age: 85
End: 2025-02-11
Payer: COMMERCIAL

## 2025-02-11 NOTE — PROGRESS NOTES
[facility] Care Coordination Contact    See encounter on 9/14/2022. Per daughter, member has returned to [facility] and with no date to return to [place]. Member is unable to contact for 6 months telephone assessment.     Dileep Jorge RN, BSN, [facility] Care Coordination  Office: [phone]  Fax: [phone]  [email or web address]

## 2025-07-08 ENCOUNTER — PATIENT OUTREACH (OUTPATIENT)
Dept: GERIATRIC MEDICINE | Facility: CLINIC | Age: 85
End: 2025-07-08
Payer: COMMERCIAL

## 2025-07-08 NOTE — LETTER
July 8, 2025    Important [employer] Information    WILMA WILL JONATAN  [address] [place] [address]  I'm Here to Help    Dear Wilma,    My name is Dileep Jorge RN and I am your [employer] Care Coordinator. When we talked, you told me that you are not interested in meeting with me to complete a health risk assessment.    As a Care Coordinator, I am here to help. My role is to make sure that your health plan is working for you. I am available to:    Review your health care needs with you over the phone or in-person   Provide support for and information about covered services or supplies to help keep you safe and healthy in your home  Answer questions about your insurance   Help you find a provider, such as a doctor or dentist, to meet your unique needs    I have included a copy of a Member Engagement Questionnaire. Please fill it out and return it in the included envelope.  I have also included a copy of a document that provides you with more information about my role as your Care Coordinator and how I can help you with your medical, social and everyday needs.    Questions?  Call me at [phone] Monday-Friday between 8am and 5pm. TTY: 711. If you d like, a friend or family member may call for you.    You may also call:  Member Services at [phone] between the hours of 8 a.m. to 9 p.m. Central, seven days a week. TTY: 711.  Please note that access to a representative may be limited during certain times of the year.    Sincerely,      Dileep Jorge RN  [phone]  [email or web address]  [employer]  [phone]  cc: member records                                                                                              _

## 2025-07-08 NOTE — PROGRESS NOTES
[facility] Care Coordination Contact      [facility] Refusal Telephone Assessment    Member refused home visit HRA on 7/8/25 (reason: Member has been out of country in [place] since 2022).    ER visits: No  Hospitalizations: No  Health concerns: n/a  Falls/Injuries: No  ADL/IADL Dependencies: Independent         Member currently receiving the following home care services: n/a     Member currently receiving the following community resources:    Informal support(s):  has family.    Advanced Care Planning discussion, complete code section.    Health Plan sponsored benefits: [employer] M[employer]O: Shared information regarding [employer] Program. Reviewed preventative health screening and health plan supplemental benefits/incentives. Reviewed medication disposal form.    Follow-Up Plan: Member informed of future contact, plan to f/u with member with a 6 month telephone assessment and offer a home visit.  Contact information shared with member and family, encouraged member to call with any questions or concerns at any time.    This CC note routed to PCP, Lavelle Aj.    CC spoke with daughter who stated member has been out of the country since 2022 in [place] with no plan to return to [place]. She has thought her [place] has been closed. CC will resubmit [facility] to [facility].     [facility] 5[facility] rightfaxed to [facility] on 7/8/25 at 1033.    Dileep Jorge RN, BSN, [facility] Care Coordination  Office: [phone]  Fax: [phone]  [email or web address]

## 2025-07-08 NOTE — PROGRESS NOTES
"[facility] Care Coordination Contact    Per CC, mailed client a \"Refusal of Home Visit\" letter and uploaded Refusal POC to [place] [employer].    Mailed member [employer] Member Engagement Questionnaire with self addressed return envelope & [employer] Leave Behind document.    Remedios Olsen  Care Management Specialist  [facility]  [phone]    "